# Patient Record
Sex: FEMALE | Race: WHITE | NOT HISPANIC OR LATINO | Employment: FULL TIME | URBAN - METROPOLITAN AREA
[De-identification: names, ages, dates, MRNs, and addresses within clinical notes are randomized per-mention and may not be internally consistent; named-entity substitution may affect disease eponyms.]

---

## 2017-01-18 ENCOUNTER — OFFICE VISIT (OUTPATIENT)
Dept: GASTROENTEROLOGY | Facility: CLINIC | Age: 42
End: 2017-01-18
Payer: COMMERCIAL

## 2017-01-18 VITALS
SYSTOLIC BLOOD PRESSURE: 119 MMHG | DIASTOLIC BLOOD PRESSURE: 69 MMHG | HEART RATE: 78 BPM | HEIGHT: 61 IN | BODY MASS INDEX: 23.19 KG/M2 | WEIGHT: 122.81 LBS

## 2017-01-18 DIAGNOSIS — K59.00 CONSTIPATION, UNSPECIFIED CONSTIPATION TYPE: Primary | ICD-10-CM

## 2017-01-18 PROCEDURE — 99203 OFFICE O/P NEW LOW 30 MIN: CPT | Mod: S$GLB,,, | Performed by: INTERNAL MEDICINE

## 2017-01-18 PROCEDURE — 99999 PR PBB SHADOW E&M-EST. PATIENT-LVL III: CPT | Mod: PBBFAC,,, | Performed by: INTERNAL MEDICINE

## 2017-01-18 PROCEDURE — 1159F MED LIST DOCD IN RCRD: CPT | Mod: S$GLB,,, | Performed by: INTERNAL MEDICINE

## 2017-01-18 RX ORDER — LEVALBUTEROL TARTRATE 45 UG/1
AEROSOL, METERED ORAL
COMMUNITY
Start: 2017-01-14 | End: 2018-03-13 | Stop reason: SDUPTHER

## 2017-01-18 RX ORDER — DEXTROAMPHETAMINE SACCHARATE, AMPHETAMINE ASPARTATE MONOHYDRATE, DEXTROAMPHETAMINE SULFATE AND AMPHETAMINE SULFATE 2.5; 2.5; 2.5; 2.5 MG/1; MG/1; MG/1; MG/1
5-10 CAPSULE, EXTENDED RELEASE ORAL DAILY
Refills: 0 | COMMUNITY
Start: 2017-01-07 | End: 2018-09-11 | Stop reason: DRUGHIGH

## 2017-01-18 RX ORDER — INSULIN ASPART 100 [IU]/ML
INJECTION, SOLUTION INTRAVENOUS; SUBCUTANEOUS
COMMUNITY
Start: 2016-12-29 | End: 2018-03-02

## 2017-01-18 RX ORDER — PEN NEEDLE, DIABETIC 32 GX 1/6"
NEEDLE, DISPOSABLE MISCELLANEOUS
COMMUNITY
Start: 2016-12-30 | End: 2021-12-31 | Stop reason: SDUPTHER

## 2017-01-18 RX ORDER — BLOOD SUGAR DIAGNOSTIC
STRIP MISCELLANEOUS
COMMUNITY
Start: 2016-12-15 | End: 2019-02-13

## 2017-01-18 RX ORDER — SODIUM CHLORIDE FOR INHALATION 7 %
4 VIAL, NEBULIZER (ML) INHALATION
COMMUNITY
Start: 2016-12-15 | End: 2018-03-13 | Stop reason: SDUPTHER

## 2017-01-18 RX ORDER — ALPRAZOLAM 0.5 MG/1
0.5 TABLET ORAL 2 TIMES DAILY PRN
COMMUNITY
Start: 2017-01-14 | End: 2023-12-27 | Stop reason: SDUPTHER

## 2017-01-18 NOTE — PROGRESS NOTES
Subjective:       Patient ID: Olinda Lombardi is a 41 y.o. female.    Chief Complaint: Hematochezia    This is a 41-year-old female who presents for evaluation of changes in bowel habits.  She has noted intermittently for some time and in the past has taken MiraLAX.  She's noted decreased frequency of bowel movements associated with some straining.  She'll then have days where she has to go numerous times and spends considerable time in the restroom.  This then was associated with hematochezia.  It only occurred during bowel movements and is described as bright red blood on the toilet paper and streaking the stool.  No change in stool caliber.  No unintentional weight loss.  The symptoms did resolve.  No further episodes over the past week.  No anorectal pain or lesions.  No family history of colon cancer or polyps.    The following portions of the patient's history were reviewed and updated as appropriate: allergies, current medications, past family history, past medical history, past social history, past surgical history and problem list.        HPI  Review of Systems   Constitutional: Negative for appetite change, chills and fever.   HENT: Negative for postnasal drip and trouble swallowing.    Eyes: Negative for pain and redness.   Respiratory: Negative for cough, choking, chest tightness and shortness of breath.    Cardiovascular: Negative for chest pain and leg swelling.   Gastrointestinal: Positive for constipation. Negative for abdominal distention, abdominal pain, blood in stool, nausea and rectal pain.   Endocrine: Negative for cold intolerance and heat intolerance.   Genitourinary: Negative for difficulty urinating and hematuria.   Musculoskeletal: Negative for arthralgias and back pain.   Skin: Negative for color change and pallor.   Allergic/Immunologic: Negative for environmental allergies and food allergies.   Neurological: Negative for dizziness and light-headedness.   Hematological: Negative for  adenopathy. Does not bruise/bleed easily.   Psychiatric/Behavioral: Negative for agitation and behavioral problems.       Objective:      Physical Exam   Constitutional: She is oriented to person, place, and time. She appears well-developed and well-nourished. No distress.   HENT:   Head: Normocephalic and atraumatic.   Eyes: Conjunctivae are normal. No scleral icterus.   Neck: Normal range of motion. Neck supple. No tracheal deviation present. No thyromegaly present.   Cardiovascular: Normal rate and regular rhythm.  Exam reveals no gallop and no friction rub.    No murmur heard.  Pulmonary/Chest: Effort normal and breath sounds normal. No respiratory distress. She has no wheezes.   Abdominal: Soft. Bowel sounds are normal. She exhibits no distension. There is no tenderness.   Musculoskeletal:        Right wrist: She exhibits normal range of motion and no tenderness.        Left wrist: She exhibits normal range of motion and no tenderness.   Lymphadenopathy:        Head (right side): No submental and no submandibular adenopathy present.        Head (left side): No submental and no submandibular adenopathy present.   Neurological: She is alert and oriented to person, place, and time.   Skin: Skin is warm and dry. No rash noted. She is not diaphoretic. No erythema.   Psychiatric: She has a normal mood and affect. Her behavior is normal.   Nursing note and vitals reviewed.      Assessment:       1. Constipation, unspecified constipation type        Plan:   1. Suspect outlet bleeding from constipation which is likely related to her CF and altered intestinal fluid composition. She will take miralax daily and I provided her with our information. We discussed conservative vs endoscopic evaluation and will be conservative at this time. Any recurrence of symptoms or lack of improvement in bowel habits and we will plan colonoscopy

## 2018-01-05 ENCOUNTER — OFFICE VISIT (OUTPATIENT)
Dept: URGENT CARE | Facility: CLINIC | Age: 43
End: 2018-01-05
Payer: COMMERCIAL

## 2018-01-05 VITALS
OXYGEN SATURATION: 98 % | DIASTOLIC BLOOD PRESSURE: 76 MMHG | RESPIRATION RATE: 18 BRPM | BODY MASS INDEX: 23.03 KG/M2 | WEIGHT: 122 LBS | HEART RATE: 107 BPM | HEIGHT: 61 IN | TEMPERATURE: 103 F | SYSTOLIC BLOOD PRESSURE: 132 MMHG

## 2018-01-05 DIAGNOSIS — R50.9 FEVER, UNSPECIFIED FEVER CAUSE: ICD-10-CM

## 2018-01-05 DIAGNOSIS — J10.1 INFLUENZA B: Primary | ICD-10-CM

## 2018-01-05 LAB
CTP QC/QA: YES
FLUAV AG NPH QL: NEGATIVE
FLUBV AG NPH QL: POSITIVE

## 2018-01-05 PROCEDURE — 99203 OFFICE O/P NEW LOW 30 MIN: CPT | Mod: S$GLB,,, | Performed by: PHYSICIAN ASSISTANT

## 2018-01-05 PROCEDURE — 87804 INFLUENZA ASSAY W/OPTIC: CPT | Mod: 59,QW,S$GLB, | Performed by: PHYSICIAN ASSISTANT

## 2018-01-05 RX ORDER — BENZONATATE 100 MG/1
100 CAPSULE ORAL 3 TIMES DAILY PRN
Qty: 30 CAPSULE | Refills: 0 | Status: SHIPPED | OUTPATIENT
Start: 2018-01-05 | End: 2018-03-13

## 2018-01-05 RX ORDER — ONDANSETRON HYDROCHLORIDE 8 MG/1
8 TABLET, FILM COATED ORAL EVERY 8 HOURS PRN
Qty: 15 TABLET | Refills: 0 | Status: SHIPPED | OUTPATIENT
Start: 2018-01-05 | End: 2018-03-13

## 2018-01-05 RX ORDER — OSELTAMIVIR PHOSPHATE 75 MG/1
75 CAPSULE ORAL 2 TIMES DAILY
Qty: 10 CAPSULE | Refills: 0 | Status: SHIPPED | OUTPATIENT
Start: 2018-01-05 | End: 2018-01-10

## 2018-01-06 NOTE — PATIENT INSTRUCTIONS
-Rest and stay hydrated.  -Tylenol every 4 hours OR ibuprofen every 6 hours as needed for pain/fever.  -Flonase OTC or Nasacort OTC for nasal congestion.  -Warm face compresses to help with facial sinus pain/pressure.  -Simple foods like chicken noodle soup.  -Delsym helps with coughing at night  -Zyrtec/Claritin during the day & Benadryl at night may help with allergies.    Please follow up with your primary care provider within 2-5 days if your signs and symptoms have not resolved or worsen.     If your condition worsens or fails to improve we recommend that you receive another evaluation at the emergency room immediately or contact your primary medical clinic to discuss your concerns.   You must understand that you have received an Urgent Care treatment only and that you may be released before all of your medical problems are known or treated. You, the patient, will arrange for follow up care as instructed.         The Flu (Influenza)     The virus that causes the flu spreads through the air in droplets when someone who has the flu coughs, sneezes, laughs, or talks.   The flu (influenza) is an infection that affects your respiratory tract. This tract is made up of your mouth, nose, and lungs, and the passages between them. Unlike a cold, the flu can make you very ill. And it can lead to pneumonia, a serious lung infection. The flu can have serious complications and even cause death.  Who is at risk for the flu?  Anyone can get the flu. But you are more likely to become infected if you:  · Have a weakened immune system  · Work in a healthcare setting where you may be exposed to flu germs  · Live or work with someone who has the flu  · Havent had an annual flu shot  How does the flu spread?  The flu is caused by a virus. The virus spreads through the air in droplets when someone who has the flu coughs, sneezes, laughs, or talks. You can become infected when you inhale these viruses directly. You can also become  infected when you touch a surface on which the droplets have landed and then transfer the germs to your eyes, nose, or mouth. Touching used tissues, or sharing utensils, drinking glasses, or a toothbrush from an infected person can expose you to flu viruses, too.  What are the symptoms of the flu?  Flu symptoms tend to come on quickly and may last a few days to a few weeks. They include:  · Fever usually higher than 100.4°F  (38°C) and chills  · Sore throat and headache  · Dry cough  · Runny nose  · Tiredness and weakness  · Muscle aches  Who is at risk for flu complications?  For some people, the flu can be very serious. The risk for complications is greater for:  · Children younger than age 5  · Adults ages 65 and older  · People with a chronic illness such as diabetes or heart, kidney, or lung disease  · People who live in a nursing home or long-term care facility   How is the flu treated?  The flu usually gets better after 7 days or so. In some cases, your healthcare provider may prescribe an antiviral medicine. This may help you get well a little sooner. For the medicine to help, you need to take it as soon as possible (ideally within 48 hours) after your symptoms start. If you develop pneumonia or other serious illness, you may need to stay in the hospital.  Easing flu symptoms  · Drink lots of fluids such as water, juice, and warm soup. A good rule is to drink enough so that you urinate your normal amount.  · Get plenty of rest.  · Ask your healthcare provider what to take for fever and pain.  · Call your provider if your fever is 100.4°F (38°C) or higher, or you become dizzy, lightheaded, or short of breath.  Taking steps to protect others  · Wash your hands often, especially after coughing or sneezing. Or clean your hands with an alcohol-based hand  containing at least 60% alcohol.  · Cough or sneeze into a tissue. Then throw the tissue away and wash your hands. If you dont have a tissue, cough and  sneeze into your elbow.  · Stay home until at least 24 hours after you no longer have a fever or chills. Be sure the fever isnt being hidden by fever-reducing medicine.  · Dont share food, utensils, drinking glasses, or a toothbrush with others.  · Ask your healthcare provider if others in your household should get antiviral medicine to help them avoid infection.  How can the flu be prevented?  · One of the best ways to avoid the flu is to get a flu vaccine each year. The virus that causes the flu changes from year to year. For that reason, healthcare providers recommend getting the flu vaccine each year, as soon as it's available in your area. The vaccine is given as a shot. Your healthcare provider can tell you which vaccine is right for you. A nasal spray is also available but is not recommended for the 4476-4674 flu season. The CDC says this is because the nasal spray did not seem to protect against the flu over the last several flu seasons. In the past, it was meant for people ages 2 to 49.  · Wash your hands often. Frequent handwashing is a proven way to help prevent infection.  · Carry an alcohol-based hand gel containing at least 60% alcohol. Use it when you can't use soap and water. Then wash your hands as soon as you can.  · Avoid touching your eyes, nose, and mouth.  · At home and work, clean phones, computer keyboards, and toys often with disinfectant wipes.  · If possible, avoid close contact with others who have the flu or symptoms of the flu.  Handwashing tips  Handwashing is one of the best ways to prevent many common infections. If you are caring for or visiting someone with the flu, wash your hands each time you enter and leave the room. Follow these steps:  · Use warm water and plenty of soap. Rub your hands together well.  · Clean the whole hand, including under your nails, between your fingers, and up the wrists.  · Wash for at least 15 seconds.  · Rinse, letting the water run down your  fingers, not up your wrists.  · Dry your hands well. Use a paper towel to turn off the faucet and open the door.  Using alcohol-based hand   Alcohol-based hand  are also a good choice. Use them when you can't use soap and water. Follow these steps:  · Squeeze about a tablespoon of gel into the palm of one hand.  · Rub your hands together briskly, cleaning the backs of your hands, the palms, between your fingers, and up the wrists.  · Rub until the gel is gone and your hands are completely dry.  Preventing the flu in healthcare settings  The flu is a special concern for people in hospitals and long-term care facilities. To help prevent the spread of flu, many hospitals and nursing homes take these steps:  · Healthcare providers wash their hands or use an alcohol-based hand  before and after treating each patient.  · People with the flu have private rooms and bathrooms or share a room with someone with the same infection.  · People who are at high risk for the flu but don't have it are encouraged to get the flu and pneumonia vaccines.  · All healthcare workers are encouraged or required to get flu shots.   Date Last Reviewed: 12/1/2016  © 3436-1596 [employer]. [address] [place] [address], [place] [address]. All rights reserved. This information is not intended as a substitute for professional medical care. Always follow your healthcare professional's instructions.

## 2018-01-06 NOTE — PROGRESS NOTES
"Subjective:       Patient ID: Olinda Lombardi is a 42 y.o. female.    Vitals:  height is 5' 1" (1.549 m) and weight is 55.3 kg (122 lb). Her temperature is 102.5 °F (39.2 °C) (abnormal). Her blood pressure is 132/76 and her pulse is 107. Her respiration is 18 and oxygen saturation is 98%.     Chief Complaint: Fever    Fever    This is a new problem. The current episode started today. The problem occurs constantly. The problem has been unchanged. The maximum temperature noted was 101 to 101.9 F. The temperature was taken using an oral thermometer. Associated symptoms include congestion, coughing and muscle aches. Pertinent negatives include no abdominal pain, chest pain, ear pain, headaches, nausea, sore throat or wheezing. She has tried NSAIDs for the symptoms. The treatment provided mild relief.     Review of Systems   Constitution: Positive for chills, fever and malaise/fatigue.   HENT: Positive for congestion. Negative for ear pain, hoarse voice and sore throat.    Eyes: Negative for discharge and redness.   Cardiovascular: Negative for chest pain, dyspnea on exertion and leg swelling.   Respiratory: Positive for cough. Negative for shortness of breath, sputum production and wheezing.    Musculoskeletal: Positive for myalgias.   Gastrointestinal: Negative for abdominal pain and nausea.   Neurological: Negative for headaches.       Objective:      Physical Exam   Constitutional: She is oriented to person, place, and time. She appears well-developed and well-nourished. She appears ill. No distress.   HENT:   Head: Normocephalic and atraumatic.   Right Ear: Hearing, tympanic membrane, external ear and ear canal normal.   Left Ear: Hearing, tympanic membrane, external ear and ear canal normal.   Nose: Nose normal. No mucosal edema. Right sinus exhibits no maxillary sinus tenderness and no frontal sinus tenderness. Left sinus exhibits no maxillary sinus tenderness and no frontal sinus tenderness.   Mouth/Throat: " Uvula is midline and oropharynx is clear and moist. No oropharyngeal exudate, posterior oropharyngeal edema or posterior oropharyngeal erythema.   Eyes: Conjunctivae, EOM and lids are normal. Right eye exhibits no discharge. Left eye exhibits no discharge.   Neck: Normal range of motion. Neck supple.   Cardiovascular: Normal rate, regular rhythm and normal heart sounds.  Exam reveals no gallop and no friction rub.    No murmur heard.  Pulmonary/Chest: Effort normal and breath sounds normal. No respiratory distress. She has no decreased breath sounds. She has no wheezes. She has no rhonchi. She has no rales.   Musculoskeletal: Normal range of motion.   Lymphadenopathy:        Head (right side): No submandibular and no tonsillar adenopathy present.        Head (left side): No submandibular and no tonsillar adenopathy present.   Neurological: She is alert and oriented to person, place, and time.   Skin: Skin is warm and dry. No rash noted. No erythema.   Psychiatric: She has a normal mood and affect. Her behavior is normal.   Nursing note and vitals reviewed.      Assessment:       1. Influenza B    2. Fever, unspecified fever cause        Office Visit on 01/05/2018   Component Date Value Ref Range Status    Rapid Influenza A Ag 01/05/2018 Negative  Negative Final    Rapid Influenza B Ag 01/05/2018 Positive* Negative Final     Acceptable 01/05/2018 Yes   Final     Plan:         Influenza B  -     POCT Influenza A/B  -     benzonatate (TESSALON PERLES) 100 MG capsule; Take 1 capsule (100 mg total) by mouth 3 (three) times daily as needed for Cough.  Dispense: 30 capsule; Refill: 0  -     oseltamivir (TAMIFLU) 75 MG capsule; Take 1 capsule (75 mg total) by mouth 2 (two) times daily.  Dispense: 10 capsule; Refill: 0  -     ondansetron (ZOFRAN) 8 MG tablet; Take 1 tablet (8 mg total) by mouth every 8 (eight) hours as needed for Nausea.  Dispense: 15 tablet; Refill: 0    Fever, unspecified fever cause  -      POCT Influenza A/B  -     benzonatate (TESSALON PERLES) 100 MG capsule; Take 1 capsule (100 mg total) by mouth 3 (three) times daily as needed for Cough.  Dispense: 30 capsule; Refill: 0  -     oseltamivir (TAMIFLU) 75 MG capsule; Take 1 capsule (75 mg total) by mouth 2 (two) times daily.  Dispense: 10 capsule; Refill: 0  -     ondansetron (ZOFRAN) 8 MG tablet; Take 1 tablet (8 mg total) by mouth every 8 (eight) hours as needed for Nausea.  Dispense: 15 tablet; Refill: 0      Patient Instructions     -Rest and stay hydrated.  -Tylenol every 4 hours OR ibuprofen every 6 hours as needed for pain/fever.  -Flonase OTC or Nasacort OTC for nasal congestion.  -Warm face compresses to help with facial sinus pain/pressure.  -Simple foods like chicken noodle soup.  -Delsym helps with coughing at night  -Zyrtec/Claritin during the day & Benadryl at night may help with allergies.    Please follow up with your primary care provider within 2-5 days if your signs and symptoms have not resolved or worsen.     If your condition worsens or fails to improve we recommend that you receive another evaluation at the emergency room immediately or contact your primary medical clinic to discuss your concerns.   You must understand that you have received an Urgent Care treatment only and that you may be released before all of your medical problems are known or treated. You, the patient, will arrange for follow up care as instructed.         The Flu (Influenza)     The virus that causes the flu spreads through the air in droplets when someone who has the flu coughs, sneezes, laughs, or talks.   The flu (influenza) is an infection that affects your respiratory tract. This tract is made up of your mouth, nose, and lungs, and the passages between them. Unlike a cold, the flu can make you very ill. And it can lead to pneumonia, a serious lung infection. The flu can have serious complications and even cause death.  Who is at risk for the  flu?  Anyone can get the flu. But you are more likely to become infected if you:  · Have a weakened immune system  · Work in a healthcare setting where you may be exposed to flu germs  · Live or work with someone who has the flu  · Havent had an annual flu shot  How does the flu spread?  The flu is caused by a virus. The virus spreads through the air in droplets when someone who has the flu coughs, sneezes, laughs, or talks. You can become infected when you inhale these viruses directly. You can also become infected when you touch a surface on which the droplets have landed and then transfer the germs to your eyes, nose, or mouth. Touching used tissues, or sharing utensils, drinking glasses, or a toothbrush from an infected person can expose you to flu viruses, too.  What are the symptoms of the flu?  Flu symptoms tend to come on quickly and may last a few days to a few weeks. They include:  · Fever usually higher than 100.4°F  (38°C) and chills  · Sore throat and headache  · Dry cough  · Runny nose  · Tiredness and weakness  · Muscle aches  Who is at risk for flu complications?  For some people, the flu can be very serious. The risk for complications is greater for:  · Children younger than age 5  · Adults ages 65 and older  · People with a chronic illness such as diabetes or heart, kidney, or lung disease  · People who live in a nursing home or long-term care facility   How is the flu treated?  The flu usually gets better after 7 days or so. In some cases, your healthcare provider may prescribe an antiviral medicine. This may help you get well a little sooner. For the medicine to help, you need to take it as soon as possible (ideally within 48 hours) after your symptoms start. If you develop pneumonia or other serious illness, you may need to stay in the hospital.  Easing flu symptoms  · Drink lots of fluids such as water, juice, and warm soup. A good rule is to drink enough so that you urinate your normal  amount.  · Get plenty of rest.  · Ask your healthcare provider what to take for fever and pain.  · Call your provider if your fever is 100.4°F (38°C) or higher, or you become dizzy, lightheaded, or short of breath.  Taking steps to protect others  · Wash your hands often, especially after coughing or sneezing. Or clean your hands with an alcohol-based hand  containing at least 60% alcohol.  · Cough or sneeze into a tissue. Then throw the tissue away and wash your hands. If you dont have a tissue, cough and sneeze into your elbow.  · Stay home until at least 24 hours after you no longer have a fever or chills. Be sure the fever isnt being hidden by fever-reducing medicine.  · Dont share food, utensils, drinking glasses, or a toothbrush with others.  · Ask your healthcare provider if others in your household should get antiviral medicine to help them avoid infection.  How can the flu be prevented?  · One of the best ways to avoid the flu is to get a flu vaccine each year. The virus that causes the flu changes from year to year. For that reason, healthcare providers recommend getting the flu vaccine each year, as soon as it's available in your area. The vaccine is given as a shot. Your healthcare provider can tell you which vaccine is right for you. A nasal spray is also available but is not recommended for the 2973-6518 flu season. The CDC says this is because the nasal spray did not seem to protect against the flu over the last several flu seasons. In the past, it was meant for people ages 2 to 49.  · Wash your hands often. Frequent handwashing is a proven way to help prevent infection.  · Carry an alcohol-based hand gel containing at least 60% alcohol. Use it when you can't use soap and water. Then wash your hands as soon as you can.  · Avoid touching your eyes, nose, and mouth.  · At home and work, clean phones, computer keyboards, and toys often with disinfectant wipes.  · If possible, avoid close  contact with others who have the flu or symptoms of the flu.  Handwashing tips  Handwashing is one of the best ways to prevent many common infections. If you are caring for or visiting someone with the flu, wash your hands each time you enter and leave the room. Follow these steps:  · Use warm water and plenty of soap. Rub your hands together well.  · Clean the whole hand, including under your nails, between your fingers, and up the wrists.  · Wash for at least 15 seconds.  · Rinse, letting the water run down your fingers, not up your wrists.  · Dry your hands well. Use a paper towel to turn off the faucet and open the door.  Using alcohol-based hand   Alcohol-based hand  are also a good choice. Use them when you can't use soap and water. Follow these steps:  · Squeeze about a tablespoon of gel into the palm of one hand.  · Rub your hands together briskly, cleaning the backs of your hands, the palms, between your fingers, and up the wrists.  · Rub until the gel is gone and your hands are completely dry.  Preventing the flu in healthcare settings  The flu is a special concern for people in hospitals and long-term care facilities. To help prevent the spread of flu, many hospitals and nursing homes take these steps:  · Healthcare providers wash their hands or use an alcohol-based hand  before and after treating each patient.  · People with the flu have private rooms and bathrooms or share a room with someone with the same infection.  · People who are at high risk for the flu but don't have it are encouraged to get the flu and pneumonia vaccines.  · All healthcare workers are encouraged or required to get flu shots.   Date Last Reviewed: 12/1/2016  © 8050-7340 [employer]. [address] [place] [address], [place] [address]. All rights reserved. This information is not intended as a substitute for professional medical care. Always follow your healthcare professional's instructions.

## 2018-02-19 ENCOUNTER — TELEPHONE (OUTPATIENT)
Dept: TRANSPLANT | Facility: CLINIC | Age: 43
End: 2018-02-19

## 2018-02-19 DIAGNOSIS — E84.9 CYSTIC FIBROSIS: Primary | ICD-10-CM

## 2018-02-19 NOTE — TELEPHONE ENCOUNTER
"Returned patient's call.  She states that she usually sees Dr. Silva at [facility] and is seeking CF care elsewhere.  She states that she is frustrated because Dr. Silva will not order things for her when she requests.  When asked if she is compliant with appointments and therapies, she states she is not.  She states that she goes to clinic "maybe once a year" and does not use inhaled therapies as ordered.  She states that she has not been hospitalized and believes that she is colonized with pseudomonas aeruginosa.  Explained that we are not a multidisciplinary CF center and we do not have the services that [facility] offers.  Also explained that Dr. Pate is a Transplant Pulmonologist and sees transplant patients.  Explained that I would have to discuss whether or not Dr. Pate could take her on as a patient with him.  She verbalized understanding.    ----- Message from Esha Correia sent at 2/19/2018  2:51 PM CST -----  Contact: Self [phone]  Pt is requesting a call back from the nurse to see if she can be scheduled as a new patient.  Pt was referred by Dr Rocío Marie and also recommended by Karen Jin. I was not given the doctor as an option for her condition, which is cystic fibrosis.     Patient may be reached at [phone].    Thank you.  LC    "

## 2018-02-23 NOTE — TELEPHONE ENCOUNTER
Pt returned my call regarding scheduling an appointment with Dr. Pate.  We discussed her health maintenance tests, she states she has not had anything done in over a year.  She also states that she has never had a DXA scan.  Pt states she had a chest xray within the last 6 months and has the disc.  She will bring it to her appointment.  Pt is aware that she will need lab work, DXA scan, spirometry, and sputum culture and states that she can come on 3/13.  Scheduling card placed on YongChe's desk.

## 2018-03-02 ENCOUNTER — TELEPHONE (OUTPATIENT)
Dept: ENDOCRINOLOGY | Facility: CLINIC | Age: 43
End: 2018-03-02

## 2018-03-02 ENCOUNTER — OFFICE VISIT (OUTPATIENT)
Dept: ENDOCRINOLOGY | Facility: CLINIC | Age: 43
End: 2018-03-02
Payer: COMMERCIAL

## 2018-03-02 VITALS
DIASTOLIC BLOOD PRESSURE: 80 MMHG | HEIGHT: 61 IN | SYSTOLIC BLOOD PRESSURE: 118 MMHG | BODY MASS INDEX: 22.4 KG/M2 | WEIGHT: 118.63 LBS

## 2018-03-02 DIAGNOSIS — E84.8 DIABETES MELLITUS RELATED TO CYSTIC FIBROSIS: Primary | ICD-10-CM

## 2018-03-02 DIAGNOSIS — E08.9 DIABETES MELLITUS RELATED TO CYSTIC FIBROSIS: Primary | ICD-10-CM

## 2018-03-02 DIAGNOSIS — E84.9 CYSTIC FIBROSIS: ICD-10-CM

## 2018-03-02 PROCEDURE — 99999 PR PBB SHADOW E&M-EST. PATIENT-LVL III: CPT | Mod: PBBFAC,,, | Performed by: INTERNAL MEDICINE

## 2018-03-02 PROCEDURE — 99204 OFFICE O/P NEW MOD 45 MIN: CPT | Mod: S$GLB,,, | Performed by: INTERNAL MEDICINE

## 2018-03-02 NOTE — PROGRESS NOTES
Subjective:      Patient ID: Olinda Lombardi is a 42 y.o. female.    Chief Complaint:  Diabetes      History of Present Illness  Ms. Lombardi presents for management of Cystic Fibrosis related diabetes    Has active history of cystic fibrosis and cystic fibrosis related diabetes.    CFRD diagnosed in 2014/2015. Was previously followed by [facility] endocrinology.    Diabetes Complications:  Denies numbness and tingling in feet   Last eye exam 8/2016.  No recent urine micro on file    Current Diabetes Regimen:  Novolog at 1:18 carb ratio (usually takes 3-4 units per meal)  No basal needs    Reports full compliance with diabetes regimen.    Current Self Reported Glucoses (usually checks on 3-4 times per day)  AM: Low 100's fasting  Pre meal: low 100's    Gets hypoglycemia a few days per week, can get into the 40's rarely. Able to recognize and treat symptoms.    Eats 2 meals per day, only gives insulin if she eats.       Review of Systems   Constitutional: Negative for unexpected weight change.   HENT: Negative for voice change.    Eyes: Negative for visual disturbance.   Respiratory: Negative for shortness of breath.    Cardiovascular: Negative for chest pain.   Gastrointestinal: Negative for abdominal pain.   Endocrine: Negative for cold intolerance.   Genitourinary: Negative for frequency.   Musculoskeletal: Negative for myalgias.   Skin: Negative for rash.       Objective:   Physical Exam   Constitutional: She is oriented to person, place, and time. She appears well-developed and well-nourished.   HENT:   Head: Normocephalic and atraumatic.   Right Ear: External ear normal.   Left Ear: External ear normal.   Nose: Nose normal.   Neck: No tracheal deviation present. No thyromegaly present.   Cardiovascular: Normal rate, regular rhythm and normal heart sounds.    No edema   Pulmonary/Chest: Effort normal and breath sounds normal.   Abdominal: Soft. Bowel sounds are normal. There is no tenderness.   Musculoskeletal:    Normal gait, no cyanosis or clubbing   Neurological: She is alert and oriented to person, place, and time. She has normal reflexes.   Vibration sense intact   Skin: Skin is warm and dry. No rash noted.   No nodules, no ulcers   Psychiatric: She has a normal mood and affect. Judgment normal.   Vitals reviewed.      Lab Review:   No recent labs on file for review    Assessment:     1. 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 Cystic fibrosis      Plan:     --Patient with CF and CF related diabetes  --Will continue prandial insulin only at this time  --No basal insulin needs  --Will change from Novolog to Fiasp  --Will continue 1:18 ICHO with meals plus correction scale  --Patient to continue checking glucoses 3-4 times daily  --Has appt with Dr. Pate  --Referral to optometry for eye exam  --Referral to diabetes educator    RTC in 3-4 months with A1c     Zackery Lester M.D. Staff Endocrinology

## 2018-03-13 ENCOUNTER — HOSPITAL ENCOUNTER (OUTPATIENT)
Dept: PULMONOLOGY | Facility: CLINIC | Age: 43
Discharge: HOME | End: 2018-03-13
Payer: COMMERCIAL

## 2018-03-13 ENCOUNTER — TELEPHONE (OUTPATIENT)
Dept: TRANSPLANT | Facility: CLINIC | Age: 43
End: 2018-03-13

## 2018-03-13 ENCOUNTER — LAB VISIT (OUTPATIENT)
Dept: LAB | Facility: HOSPITAL | Age: 43
End: 2018-03-13
Attending: INTERNAL MEDICINE
Payer: COMMERCIAL

## 2018-03-13 ENCOUNTER — HOSPITAL ENCOUNTER (OUTPATIENT)
Dept: RADIOLOGY | Facility: CLINIC | Age: 43
Discharge: HOME | End: 2018-03-13
Attending: INTERNAL MEDICINE
Payer: COMMERCIAL

## 2018-03-13 ENCOUNTER — OFFICE VISIT (OUTPATIENT)
Dept: TRANSPLANT | Facility: CLINIC | Age: 43
End: 2018-03-13
Payer: COMMERCIAL

## 2018-03-13 VITALS
HEART RATE: 89 BPM | BODY MASS INDEX: 22.08 KG/M2 | OXYGEN SATURATION: 100 % | SYSTOLIC BLOOD PRESSURE: 125 MMHG | RESPIRATION RATE: 20 BRPM | WEIGHT: 120 LBS | HEIGHT: 62 IN | TEMPERATURE: 97 F | DIASTOLIC BLOOD PRESSURE: 66 MMHG

## 2018-03-13 DIAGNOSIS — E08.9 DIABETES MELLITUS RELATED TO CYSTIC FIBROSIS: ICD-10-CM

## 2018-03-13 DIAGNOSIS — E84.9 CYSTIC FIBROSIS: ICD-10-CM

## 2018-03-13 DIAGNOSIS — E84.9 CYSTIC FIBROSIS: Primary | ICD-10-CM

## 2018-03-13 DIAGNOSIS — K86.89 PANCREATIC INSUFFICIENCY: ICD-10-CM

## 2018-03-13 DIAGNOSIS — E84.8 DIABETES MELLITUS RELATED TO CYSTIC FIBROSIS: ICD-10-CM

## 2018-03-13 LAB
PRE FEV1 FVC: 67
PRE FEV1: 2.01
PRE FVC: 3.01
PREDICTED FEV1 FVC: 85
PREDICTED FEV1: 2.66
PREDICTED FVC: 3.14

## 2018-03-13 PROCEDURE — 87116 MYCOBACTERIA CULTURE: CPT

## 2018-03-13 PROCEDURE — 87206 SMEAR FLUORESCENT/ACID STAI: CPT

## 2018-03-13 PROCEDURE — 99999 PR PBB SHADOW E&M-EST. PATIENT-LVL III: CPT | Mod: PBBFAC,,, | Performed by: INTERNAL MEDICINE

## 2018-03-13 PROCEDURE — 87077 CULTURE AEROBIC IDENTIFY: CPT

## 2018-03-13 PROCEDURE — 87186 SC STD MICRODIL/AGAR DIL: CPT

## 2018-03-13 PROCEDURE — 87205 SMEAR GRAM STAIN: CPT

## 2018-03-13 PROCEDURE — 87070 CULTURE OTHR SPECIMN AEROBIC: CPT

## 2018-03-13 PROCEDURE — 77080 DXA BONE DENSITY AXIAL: CPT | Mod: TC

## 2018-03-13 PROCEDURE — 87015 SPECIMEN INFECT AGNT CONCNTJ: CPT

## 2018-03-13 PROCEDURE — 77080 DXA BONE DENSITY AXIAL: CPT | Mod: 26,,, | Performed by: INTERNAL MEDICINE

## 2018-03-13 PROCEDURE — 94010 BREATHING CAPACITY TEST: CPT | Mod: S$GLB,,, | Performed by: INTERNAL MEDICINE

## 2018-03-13 PROCEDURE — 99204 OFFICE O/P NEW MOD 45 MIN: CPT | Mod: 25,S$GLB,, | Performed by: INTERNAL MEDICINE

## 2018-03-13 RX ORDER — DEXTROAMPHETAMINE SACCHARATE, AMPHETAMINE ASPARTATE MONOHYDRATE, DEXTROAMPHETAMINE SULFATE AND AMPHETAMINE SULFATE 3.75; 3.75; 3.75; 3.75 MG/1; MG/1; MG/1; MG/1
15 CAPSULE, EXTENDED RELEASE ORAL EVERY MORNING
COMMUNITY
End: 2020-02-13

## 2018-03-13 RX ORDER — SODIUM CHLORIDE FOR INHALATION 7 %
4 VIAL, NEBULIZER (ML) INHALATION 2 TIMES DAILY
Qty: 240 ML | Refills: 12 | Status: SHIPPED | OUTPATIENT
Start: 2018-03-13 | End: 2020-03-13 | Stop reason: SDUPTHER

## 2018-03-13 RX ORDER — TOBRAMYCIN INHALATION 300 MG/4ML
300 SOLUTION RESPIRATORY (INHALATION) 2 TIMES DAILY
Qty: 240 ML | Refills: 12 | Status: SHIPPED | OUTPATIENT
Start: 2018-03-13 | End: 2018-03-26

## 2018-03-13 RX ORDER — LEVALBUTEROL TARTRATE 45 UG/1
1-2 AEROSOL, METERED ORAL EVERY 4 HOURS PRN
Qty: 1 INHALER | Refills: 12 | Status: SHIPPED | OUTPATIENT
Start: 2018-03-13 | End: 2021-09-28

## 2018-03-13 NOTE — PROGRESS NOTES
LUNG TRANSPLANT INITIAL EVALUATION                                                                                                                                            Reason for Visit:  Evaluation for lung transplant    Referring Physician: Self Referral    History of Present Illness: Olinda Lombardi is a 42 y.o. female who is on 0L of oxygen.  She is on no assisted ventilation.  Her New York Heart Association Class is I and a Karnofsky score of 90% - Able to carry on normal activity: minor symptoms of disease. She is diabetic insulin dependent  She presents to transplant clinic for initial evaluation of bronchiectasis from cystic fibrosis.  She is currently followed by Dr. Silva at [facility] but would like to transition care to [facility].        States that she was diagnosed with CF at the age of 6 months when she was evaluated for reflux and decreased weight.  She is a F508/542 del.  States she never truly had respiratory symptoms but was started on inhaler and airway clearance regimen in her 30's.  She has an occasional cough which is productive of green sputum.  Does not produce copious amounts.  She grows Pseudomonas and Staph.  No history of atypical mycobacterial infections.  She has never been hospitalized for CF exacerbation and has never required IV abx.  She has not required po abx for years.  She has never had pneumothoraces or hemoptysis.        Her current respiratory regimen is hypertonic saline, pulmozyme, levalbuterol, and CPT.  She has used the hypertonic saline twice within the last month, pulmozyme once in the last few months, and does not use her CPT vest.  She has had some shortness of breath and occasional cough for which she does use the levalbuterol daily.  Last time she was seen at [facility], it was recommended that she start azithro three times a week and inhaled torres or cayston.  She had reservations about starting these medications and therefore has not started them.      She  "has CF related DM for which she follows with endocrine.  She is adherent to her insulin therapy and her most recent HgbA1C is 6.1%.  She does not exercise but has lost weight since making dietary modifications once she was diagnosed with diabetes.  She takes aidee-pep as prescribed and has some trouble with constipation which is resolved with miralax.  She does not take any supplemental vitamins.  Denies any sinus congestion or drainage.  Takes xyzal for allergies.  Does not use any sinus rinses or steroids.  Had sinus surgery at the ages of 9 and 11.      She currently  and works as a relator.  Does not smoke or use illicits.  Does drink alcohol occasionally.  Does not have children.      Past Medical History:   Diagnosis Date    Abnormal Pap smear of vagina     Cystic fibrosis     Diabetes mellitus      Colonoscopy Results: No procedure found.    Past Surgical History:   Procedure Laterality Date    ADENOIDECTOMY       [place]: none    Blood Product Transfusions: no    Allergies: Penicillin    Current Outpatient Prescriptions   Medication Sig    ACZONE 5 % topical gel     alprazolam (XANAX) 0.5 MG tablet     AMPHETAMINE SALT COMBO 10 MG tablet Take 1 tablet by mouth once daily.    benzonatate (TESSALON PERLES) 100 MG capsule Take 1 capsule (100 mg total) by mouth 3 (three) times daily as needed for Cough.    CONTOUR NEXT STRIPS Strp     dextroamphetamine-amphetamine (ADDERALL XR) 10 MG 24 hr capsule Take by mouth once daily.    HYPER-SAL 7 % nebulizer solution     insulin aspart, niacinamide, (FIASP FLEXTOUCH U-100 INSULIN) 100 unit/mL (3 mL) InPn Inject 10 Units into the skin 3 (three) times daily before meals.    levocetirizine (XYZAL) 5 MG tablet     lipase-protease-amylase 20,000-68,000 -109,000 unit CpDR Take by mouth.    NOVOFINE PLUS 32 gauge x 1/6" Ndle     ondansetron (ZOFRAN) 8 MG tablet Take 1 tablet (8 mg total) by mouth every 8 (eight) hours as needed for Nausea.    sertraline " (ZOLOFT) 100 MG tablet     TAZORAC 0.05 % Crea cream     trazodone (DESYREL) 150 MG tablet     XOPENEX HFA 45 mcg/actuation inhaler      No current facility-administered medications for this visit.          There is no immunization history on file for this patient.  Family History:    Family History   Problem Relation Age of Onset    Heart disease Father     Diabetes Mother     Breast cancer Neg Hx     Colon cancer Neg Hx     Ovarian cancer Neg Hx      History   Alcohol Use    1.2 oz/week    2 Glasses of wine per week     Comment: 1-2 drinks a week       History   Drug Use No      Social History     Social History    Marital status:      Spouse name: N/A    Number of children: N/A    Years of education: N/A     Occupational History    Not on file.     Social History Main Topics    Smoking status: Never Smoker    Smokeless tobacco: Never Used    Alcohol use 1.2 oz/week     2 Glasses of wine per week      Comment: 1-2 drinks a week     Drug use: No    Sexual activity: Yes     Partners: Male      Comment:       Other Topics Concern    Not on file     Social History Narrative    No narrative on file     Review of Systems   Constitutional: Negative for chills, diaphoresis, fever, malaise/fatigue and weight loss.   HENT: Negative for congestion, ear discharge, ear pain, hearing loss, nosebleeds, sinus pain, sore throat and tinnitus.    Eyes: Negative for blurred vision, double vision, photophobia, pain, discharge and redness.   Respiratory: Negative for cough, hemoptysis, sputum production, shortness of breath, wheezing and stridor.    Cardiovascular: Negative for chest pain, palpitations, orthopnea, claudication, leg swelling and PND.   Gastrointestinal: Negative for abdominal pain, blood in stool, constipation, diarrhea, heartburn, melena, nausea and vomiting.   Genitourinary: Negative for dysuria, flank pain, frequency, hematuria and urgency.   Musculoskeletal: Negative for back pain,  "falls, joint pain, myalgias and neck pain.   Skin: Negative for itching and rash.   Neurological: Negative for dizziness, tingling, tremors, sensory change, speech change, focal weakness, seizures, loss of consciousness, weakness and headaches.   Endo/Heme/Allergies: Negative for environmental allergies and polydipsia. Does not bruise/bleed easily.   Psychiatric/Behavioral: Negative for depression, hallucinations, memory loss, substance abuse and suicidal ideas. The patient is not nervous/anxious and does not have insomnia.      Vitals  /66   Pulse 89   Temp 96.7 °F (35.9 °C) (Oral)   Resp 20   Ht 5' 2" (1.575 m)   Wt 54.4 kg (120 lb)   SpO2 100%   BMI 21.95 kg/m²   Physical Exam   Constitutional: She is oriented to person, place, and time and well-developed, well-nourished, and in no distress. No distress.   HENT:   Head: Normocephalic and atraumatic.   Nose: Nose normal.   Mouth/Throat: Oropharynx is clear and moist. No oropharyngeal exudate.   Eyes: Conjunctivae and EOM are normal. Pupils are equal, round, and reactive to light. Right eye exhibits no discharge. Left eye exhibits no discharge. No scleral icterus.   Neck: Normal range of motion. Neck supple. No JVD present. No tracheal deviation present. No thyromegaly present.   Cardiovascular: Normal rate, regular rhythm, normal heart sounds and intact distal pulses.  Exam reveals no gallop and no friction rub.    No murmur heard.  Pulmonary/Chest: Effort normal and breath sounds normal. No stridor. No respiratory distress. She has no wheezes. She has no rales. She exhibits no tenderness.   Abdominal: Bowel sounds are normal. She exhibits no distension. There is no tenderness. There is no guarding.   Musculoskeletal: Normal range of motion. She exhibits no edema, tenderness or deformity.   Lymphadenopathy:     She has no cervical adenopathy.   Neurological: She is oriented to person, place, and time. No cranial nerve deficit. Gait normal. " Coordination normal. GCS score is 15.   Skin: Skin is dry. No rash noted. She is not diaphoretic. No erythema. No pallor.   Psychiatric: Mood and affect normal.       Labs:  Lab Visit on 03/13/2018   Component Date Value    WBC 03/13/2018 4.40     RBC 03/13/2018 3.80*    Hemoglobin 03/13/2018 9.8*    Hematocrit 03/13/2018 31.0*    MCV 03/13/2018 82     MCH 03/13/2018 25.8*    MCHC 03/13/2018 31.6*    RDW 03/13/2018 14.3     Platelets 03/13/2018 333     MPV 03/13/2018 9.8     Immature Granulocytes 03/13/2018 0.5     Gran # (ANC) 03/13/2018 2.9     Immature Grans (Abs) 03/13/2018 0.02     Lymph # 03/13/2018 1.1     Mono # 03/13/2018 0.2*    Eos # 03/13/2018 0.1     Baso # 03/13/2018 0.05     nRBC 03/13/2018 0     Gran% 03/13/2018 66.1     Lymph% 03/13/2018 24.8     Mono% 03/13/2018 5.2     Eosinophil% 03/13/2018 2.3     Basophil% 03/13/2018 1.1     Differential Method 03/13/2018 Automated        Pulmonary Function Tests 3/13/2018 8/31/2016 8/5/2015 10/29/2014   FVC 3.07 3.15 3.29 3.26   FEV1 2.01 2.09 2.14 2.16   FVC% 98 94 98 97   FEV1% 77 76 77 77   FEF 25-75 1.08 1.13 98 1.12   FEF 25-75% 36 38 32 36     Imaging:  Results for orders placed during the hospital encounter of 09/08/12   X-Ray Chest PA And Lateral    Narrative DATE OF EXAM: Sep  8 2012      GEN   0012  -  CHEST PA & LAT:   \  [id number]     CLINICAL HISTORY:   \CHEST PAIN     PROCEDURE COMMENT:   \     ICD 9 CODE(S):   (\)     CPT 4 CODE(S)/MODIFIER(S):   (\)     Comparison: None.      Findings: No pneumothorax. The cardiomediastinal contour is within normal   limits. No findings to suggest pleural effusions. The lungs are clear.        Impression:  No acute process identified.    ______________________________________      Electronically signed by: Zulema Toth MD  Date:     09/09/12  Time:    08:45            : ERICA  Transcribe Date/Time: Sep  9 2012  8:45A  Dictated by : ZULEMA TOTH MD  Read On:   \  Images  were reviewed, findings were verified and document was   electronically  SIGNED BY: ZULEMA DIEHL MD On: Sep  9 2012  8:45A          Cardiodiagnostics:  None    Assessment:  1. Cystic fibrosis    2. Pancreatic insufficiency    3. Diabetes mellitus related to cystic fibrosis      Plan:   1. Currently being evaluated today for CF related bronchiectasis.  She has mild obstruction on PFTs which remains stable compared to the data we have from [facility].  She has known colonization with Pseudomonas and occasionally grows Staph.  No hx of atypical mycobacterium.  Sputum culture done today is pending.  We had a long discussion today regarding treatment and compliance to therapy.  Discussed that while her disease is mild, it is still present and the aim should be preserving lung function.  Discussed that the two most important medications for her at this time are pulmozyme and inhaled tobramycin.  She is agreeable to doing the pulmozyme once daily (twice daily causes chest tightness) and the inhaled tobramycin.  Discussed that the proper use would be to use her inhaled levalbuterol followed by hypertonic saline followed by the pulmozyme followed by the CPT vest.  Inhaled torres will be BID.  Offered azithromycin 3 times weekly which she refuses at this time.  Discussed that we will need to see her every 3 months for evaluation and she is agreeable to the plan.    2. Continue with aidee-pep with meals and snacks.  Will add aquadeks today.  Vitamin levels are pending from labs today.  Will refer to Dr. Shultz for further evaluation.    3. Continue with insulin and endocrine follow-up.  HgbA1C today is 6.1%.    4. Follow-up in 3 months or earlier if needed.    Vidya Rasmussen DO  [facility]    Attending Note:    I have seen and evaluated the patient with the fellow. Their note reflects the content of our discussion and my plan of care.      Chadwick Pate MD  Pulmonary/Critical Care Medicine

## 2018-03-13 NOTE — PROGRESS NOTES
"LUNG TRANSPLANT INITIAL PULMONARY CONSULT    Reason for Visit: No chief complaint on file.       Referring Physician:                                                                                                       Date of Transplant: N/A    Type of Transplant: {Organ:64650}                                                                               Reason for Transplant: No diagnosis found.                                                                               CMV Status: {CMV STATUS:82287}                                                                              Major Complications: ***                                                                                              History of Present Illness: ***    Current Outpatient Prescriptions   Medication Sig    ACZONE 5 % topical gel     alprazolam (XANAX) 0.5 MG tablet     AMPHETAMINE SALT COMBO 10 MG tablet Take 1 tablet by mouth once daily.    benzonatate (TESSALON PERLES) 100 MG capsule Take 1 capsule (100 mg total) by mouth 3 (three) times daily as needed for Cough.    CONTOUR NEXT STRIPS Strp     dextroamphetamine-amphetamine (ADDERALL XR) 10 MG 24 hr capsule Take by mouth once daily.    HYPER-SAL 7 % nebulizer solution     insulin aspart, niacinamide, (FIASP FLEXTOUCH U-100 INSULIN) 100 unit/mL (3 mL) InPn Inject 10 Units into the skin 3 (three) times daily before meals.    levocetirizine (XYZAL) 5 MG tablet     lipase-protease-amylase 20,000-68,000 -109,000 unit CpDR Take by mouth.    NOVOFINE PLUS 32 gauge x 1/6" Ndle     ondansetron (ZOFRAN) 8 MG tablet Take 1 tablet (8 mg total) by mouth every 8 (eight) hours as needed for Nausea.    sertraline (ZOLOFT) 100 MG tablet     TAZORAC 0.05 % Crea cream     trazodone (DESYREL) 150 MG tablet     XOPENEX HFA 45 mcg/actuation inhaler      No current facility-administered medications for this visit.      ROS  Vitals  /66   Pulse 89   Temp 96.7 °F (35.9 °C) (Oral)   Resp " "20   Ht 5' 2" (1.575 m)   Wt 54.4 kg (120 lb)   SpO2 100%   BMI 21.95 kg/m²     Physical Exam    Labs:  Lab Visit on 03/13/2018   Component Date Value    WBC 03/13/2018 4.40     RBC 03/13/2018 3.80*    Hemoglobin 03/13/2018 9.8*    Hematocrit 03/13/2018 31.0*    MCV 03/13/2018 82     MCH 03/13/2018 25.8*    MCHC 03/13/2018 31.6*    RDW 03/13/2018 14.3     Platelets 03/13/2018 333     MPV 03/13/2018 9.8     Immature Granulocytes 03/13/2018 0.5     Gran # (ANC) 03/13/2018 2.9     Immature Grans (Abs) 03/13/2018 0.02     Lymph # 03/13/2018 1.1     Mono # 03/13/2018 0.2*    Eos # 03/13/2018 0.1     Baso # 03/13/2018 0.05     nRBC 03/13/2018 0     Gran% 03/13/2018 66.1     Lymph% 03/13/2018 24.8     Mono% 03/13/2018 5.2     Eosinophil% 03/13/2018 2.3     Basophil% 03/13/2018 1.1     Differential Method 03/13/2018 Automated        No flowsheet data found.  Other: ***    Assessment:  No diagnosis found.  Plan: ***   "

## 2018-03-13 NOTE — TELEPHONE ENCOUNTER
Pt asked if she could have food allergy labs added on to her labs.  Explained that if Dr. Pate would like to order food allergy labs, then they could be scheduled for a future date.  Explained that typically allergy panels would be ordered by an Allergist.  She verbalized understanding.    ----- Message from Bernie Peterson sent at 3/13/2018  9:10 AM CDT -----  Contact: Pt   Pt has questions regarding lab testing she is having done today, Please contact as soon as possible due to pt being there now     Contact [phone]  Thanks

## 2018-03-14 ENCOUNTER — TELEPHONE (OUTPATIENT)
Dept: PHARMACY | Facility: CLINIC | Age: 43
End: 2018-03-14

## 2018-03-15 ENCOUNTER — PATIENT MESSAGE (OUTPATIENT)
Dept: TRANSPLANT | Facility: CLINIC | Age: 43
End: 2018-03-15

## 2018-03-17 LAB
BACTERIA SPT CF RESP CULT: NORMAL
BACTERIA SPT CF RESP CULT: NORMAL
GRAM STN SPEC: NORMAL

## 2018-03-26 DIAGNOSIS — E84.9 CYSTIC FIBROSIS: Primary | ICD-10-CM

## 2018-03-26 RX ORDER — TOBRAMYCIN INHALATION 300 MG/4ML
300 SOLUTION RESPIRATORY (INHALATION) 2 TIMES DAILY
Qty: 240 ML | Refills: 11 | Status: SHIPPED | OUTPATIENT
Start: 2018-03-26 | End: 2019-12-05

## 2018-03-27 ENCOUNTER — TELEPHONE (OUTPATIENT)
Dept: PHARMACY | Facility: CLINIC | Age: 43
End: 2018-03-27

## 2018-04-05 ENCOUNTER — TELEPHONE (OUTPATIENT)
Dept: PHARMACY | Facility: HOSPITAL | Age: 43
End: 2018-04-05

## 2018-04-05 ENCOUNTER — PATIENT MESSAGE (OUTPATIENT)
Dept: TRANSPLANT | Facility: CLINIC | Age: 43
End: 2018-04-05

## 2018-04-06 ENCOUNTER — PATIENT MESSAGE (OUTPATIENT)
Dept: TRANSPLANT | Facility: CLINIC | Age: 43
End: 2018-04-06

## 2018-04-06 DIAGNOSIS — E84.9 CYSTIC FIBROSIS: Primary | ICD-10-CM

## 2018-04-16 ENCOUNTER — TELEPHONE (OUTPATIENT)
Dept: GASTROENTEROLOGY | Facility: CLINIC | Age: 43
End: 2018-04-16

## 2018-04-16 NOTE — TELEPHONE ENCOUNTER
Pt called running late.  MD advised for pt to reschedule appt.  Marj will call to assist pt with rescheduling appt.  Understanding expressed by pt.

## 2018-04-17 ENCOUNTER — TELEPHONE (OUTPATIENT)
Dept: GASTROENTEROLOGY | Facility: CLINIC | Age: 43
End: 2018-04-17

## 2018-04-17 NOTE — TELEPHONE ENCOUNTER
Patient was scheduled to see Dr. Shultz 4/16 at 8:00 am.    Patient called the clinic at 8:22 am 4/16 and asked if she can be seen.    Patient was asked to reschedule appointment.    Attempted to contact patient without success to reschedule appointment.    Left message for patient to call the clinic back to reschedule appointment with Dr. Shultz.

## 2018-04-19 ENCOUNTER — PATIENT MESSAGE (OUTPATIENT)
Dept: GASTROENTEROLOGY | Facility: CLINIC | Age: 43
End: 2018-04-19

## 2018-04-20 ENCOUNTER — TELEPHONE (OUTPATIENT)
Dept: GASTROENTEROLOGY | Facility: CLINIC | Age: 43
End: 2018-04-20

## 2018-04-23 ENCOUNTER — DOCUMENTATION ONLY (OUTPATIENT)
Dept: TRANSPLANT | Facility: CLINIC | Age: 43
End: 2018-04-23

## 2018-04-23 NOTE — PROGRESS NOTES
Notified Dr. Pate that patient has no showed or rescheduled her consult appointment with Dr. Shultz 3 times.

## 2018-05-02 DIAGNOSIS — E84.9 CYSTIC FIBROSIS: Primary | ICD-10-CM

## 2018-05-15 ENCOUNTER — PATIENT MESSAGE (OUTPATIENT)
Dept: ENDOCRINOLOGY | Facility: CLINIC | Age: 43
End: 2018-05-15

## 2018-05-15 LAB
ACID FAST MOD KINY STN SPEC: NORMAL
MYCOBACTERIUM SPEC QL CULT: NORMAL

## 2018-05-16 ENCOUNTER — PATIENT MESSAGE (OUTPATIENT)
Dept: ENDOCRINOLOGY | Facility: CLINIC | Age: 43
End: 2018-05-16

## 2018-05-17 ENCOUNTER — PATIENT MESSAGE (OUTPATIENT)
Dept: ENDOCRINOLOGY | Facility: CLINIC | Age: 43
End: 2018-05-17

## 2018-05-17 ENCOUNTER — PATIENT MESSAGE (OUTPATIENT)
Dept: GASTROENTEROLOGY | Facility: CLINIC | Age: 43
End: 2018-05-17

## 2018-05-22 ENCOUNTER — OFFICE VISIT (OUTPATIENT)
Dept: OBSTETRICS AND GYNECOLOGY | Facility: CLINIC | Age: 43
End: 2018-05-22
Payer: COMMERCIAL

## 2018-05-22 VITALS
WEIGHT: 115 LBS | HEIGHT: 62 IN | DIASTOLIC BLOOD PRESSURE: 86 MMHG | BODY MASS INDEX: 21.16 KG/M2 | SYSTOLIC BLOOD PRESSURE: 128 MMHG

## 2018-05-22 DIAGNOSIS — N63.0 BREAST MASS: ICD-10-CM

## 2018-05-22 DIAGNOSIS — Z01.419 ENCOUNTER FOR GYNECOLOGICAL EXAMINATION: Primary | ICD-10-CM

## 2018-05-22 DIAGNOSIS — N92.6 IRREGULAR BLEEDING: ICD-10-CM

## 2018-05-22 PROCEDURE — 99999 PR PBB SHADOW E&M-EST. PATIENT-LVL IV: CPT | Mod: PBBFAC,,, | Performed by: OBSTETRICS & GYNECOLOGY

## 2018-05-22 PROCEDURE — 87624 HPV HI-RISK TYP POOLED RSLT: CPT

## 2018-05-22 PROCEDURE — 88175 CYTOPATH C/V AUTO FLUID REDO: CPT

## 2018-05-22 PROCEDURE — 99396 PREV VISIT EST AGE 40-64: CPT | Mod: S$GLB,,, | Performed by: OBSTETRICS & GYNECOLOGY

## 2018-05-22 NOTE — PROGRESS NOTES
"CC: Well woman exam    Olinda Lombardi is a 43 y.o. female  presents for a well woman exam.      Irregular bleeding and spotting between periods.  Sexually active, does not prevent but hasn't gotten pregnant thus far.  Declines contraception.      C/o Swollen lymph node in right axilla x few months.  No breast masses.        Past Medical History:   Diagnosis Date    Abnormal Pap smear of vagina     Bilateral carpal tunnel syndrome     Cystic fibrosis     Diabetes mellitus     CFRD       Past Surgical History:   Procedure Laterality Date    ADENOIDECTOMY      SINUS SURGERY         OB History    Para Term  AB Living   0 0 0 0 0 0   SAB TAB Ectopic Multiple Live Births   0 0 0 0               Family History   Problem Relation Age of Onset    Heart disease Father     Diabetes Mother     Breast cancer Neg Hx     Colon cancer Neg Hx     Ovarian cancer Neg Hx        Social History   Substance Use Topics    Smoking status: Never Smoker    Smokeless tobacco: Never Used    Alcohol use 1.2 oz/week     2 Glasses of wine per week      Comment: 1-2 drinks a week        /86   Ht 5' 2" (1.575 m)   Wt 52.2 kg (114 lb 15.5 oz)   LMP 2018 (Exact Date)   BMI 21.03 kg/m²     ROS:  GENERAL: Denies weight gain or weight loss. Feeling well overall.   SKIN: Denies rash or lesions.   HEAD: Denies head injury or headache.   NODES: Denies enlarged lymph nodes.   CHEST: Denies chest pain or shortness of breath.   CARDIOVASCULAR: Denies palpitations or left sided chest pain.   ABDOMEN: No abdominal pain, constipation, diarrhea, nausea, vomiting or rectal bleeding.   URINARY: No frequency, dysuria, hematuria, or burning on urination.  REPRODUCTIVE: See HPI.   BREASTS: The patient performs breast self-examination and denies pain, lumps, or nipple discharge.   HEMATOLOGIC: No easy bruisability or excessive bleeding.  MUSCULOSKELETAL: Denies joint pain or swelling.   NEUROLOGIC: Denies syncope " or weakness.   PSYCHIATRIC: Denies depression, anxiety or mood swings.    Physical Exam:    APPEARANCE: Well nourished, well developed, in no acute distress.  AFFECT: WNL, alert and oriented x 3  SKIN: No acne or hirsutism  NECK: Neck symmetric without masses or thyromegaly  NODES: No inguinal, cervical, axillary, or femoral lymph node enlargement  CHEST: Good respiratory effect  ABDOMEN: Soft.  No tenderness or masses.  No hepatosplenomegaly.  No hernias.  BREASTS: Symmetrical, no skin changes or visible lesions.  No palpable masses, nipple discharge bilaterally.  PELVIC: Normal external genitalia without lesions.  Normal hair distribution.  Adequate perineal body, normal urethral meatus.  Vagina moist and well rugated without lesions or discharge.  Cervix pink, without lesions, discharge or tenderness.  No significant cystocele or rectocele.  Bimanual exam shows uterus to be normal size, regular, mobile and nontender.  Adnexa without masses or tenderness.    EXTREMITIES: No edema.    ASSESSMENT AND PLAN  1. Encounter for gynecological examination     2. Breast mass  Mammo Digital Diagnostic Bilat with Calixto    US Breast Right Complete   3. Irregular bleeding  US Pelvis Comp with Transvag NON-OB (xpd    Liquid-based pap smear, screening    HPV High Risk Genotypes, PCR       Patient was counseled today on A.C.S. Pap guidelines and recommendations for yearly pelvic exams, mammograms and monthly self breast exams; to see her PCP for other health maintenance.     Ultrasound for eval irregular bleeding and spotting.  On exam able to feel node but feel another on left. Get mmg and imaging.     Follow-up in about 1 year (around 5/22/2019).

## 2018-05-25 LAB
HPV16 AG SPEC QL: NEGATIVE
HPV16+18+H RISK 12 DNA CVX-IMP: NEGATIVE
HPV18 DNA SPEC QL NAA+PROBE: NEGATIVE

## 2018-05-29 ENCOUNTER — HOSPITAL ENCOUNTER (OUTPATIENT)
Dept: RADIOLOGY | Facility: HOSPITAL | Age: 43
Discharge: HOME | End: 2018-05-29
Attending: OBSTETRICS & GYNECOLOGY
Payer: COMMERCIAL

## 2018-05-29 VITALS — HEIGHT: 62 IN | BODY MASS INDEX: 20.98 KG/M2 | WEIGHT: 114 LBS

## 2018-05-29 DIAGNOSIS — N63.0 BREAST MASS: ICD-10-CM

## 2018-05-29 PROCEDURE — 77066 DX MAMMO INCL CAD BI: CPT | Mod: 26,,, | Performed by: RADIOLOGY

## 2018-05-29 PROCEDURE — 76642 ULTRASOUND BREAST LIMITED: CPT | Mod: TC,50,PO

## 2018-05-29 PROCEDURE — 76642 ULTRASOUND BREAST LIMITED: CPT | Mod: 26,50,, | Performed by: RADIOLOGY

## 2018-05-29 PROCEDURE — 77062 BREAST TOMOSYNTHESIS BI: CPT | Mod: 26,,, | Performed by: RADIOLOGY

## 2018-05-29 PROCEDURE — 77062 BREAST TOMOSYNTHESIS BI: CPT | Mod: TC,PO

## 2018-06-05 ENCOUNTER — PATIENT MESSAGE (OUTPATIENT)
Dept: OBSTETRICS AND GYNECOLOGY | Facility: CLINIC | Age: 43
End: 2018-06-05

## 2018-06-05 ENCOUNTER — PATIENT MESSAGE (OUTPATIENT)
Dept: GASTROENTEROLOGY | Facility: CLINIC | Age: 43
End: 2018-06-05

## 2018-06-06 NOTE — PROGRESS NOTES
[facility] Consultation Note    Reason for Consult:    Chief Complaint   Patient presents with    Abdominal Pain    Bloated    Gas    Diarrhea    Constipation    Rectal Bleeding    Weight Loss         PCP:   Ashutosh Kwan       Referring MD:  Pulmonology: Dr. Pate    GI: Dr. Onofre (2017)    HPI:  Olinda Lombardi is a 43 y.o. female with a  PMH of Cystic fibrosis, bronchiectasis 2/2 cystic fibrosis, type 2 DM  2/2 CF referred to motility clinic for second opinion regarding the following problems:    Abdominal pain. Reports abdominal pain  Character:ache  Location:generalized  Frequency: most days weekly   Duration:30 min   Onset:several years  Worse with:worse after meals.  Improves with:pepto bismol, imodium  Associated with Bm: yes  Nocturnal pain: no  Has not tried IBgard, Bentyl, Levsin, Levbid.  .  Antidepressants:zoloft, Trazodone  Using narcotic pain medication: no    Gas and bloating.   Bloating: yes  Excessive gas: yes  Abdominal distension: yes   Symptoms get worse after meals:yes  Symptoms get worse as the day progresses:  yes  Consumes lactose:has milk allergy. Takes lactaid prior to any dairy intak  Consumes artificial sugars:avoids    Diarrhea.  Reports loose to watery stools.    O'Brien: 6-7; usually like soft serve ice cream  Frequency:3 days weekly  Symptoms started: couple years, with worsening in the last few months  Nocturnal symptoms: no  Fecal incontinence: no     No improvement with imodium PRN  No improvement with 3-4 tabs zenpep with meals and 4-5 with snacks  Has not taken lomotil, questran, viberzi    Constipation.  Reports bothersome constipation:  Lumpy and hard, difficult to pass stools:not lumpy or hard, but difficult to pass  Urgency and sensation of incomplete evacuation:yes  Straining during defecation:yes  Sensation of anorectal blockage:no  Rectal prolapse:yes  Fewer than three spontaneous bowel movements per week:no  Frequency:3  days weekly  Symptoms started: couple of years  Uses manual maneuvers to facilitate defecation:no    Problems in early childhood: no  History of perineal trauma: no   Previous evaluation: no  Previous pelvic muscle retraining: no    some improvement with miralax daily.   some improvement with dulcolax PRN, results in BRBPR  Has not tried  Linzess, amitiza, trulance, lactulose, fiber, senna, colace    BRBPR. With mucus. X 8 months. Moderate amt. On TP, in TW, in stool. When taking dulcolax and during menstruation.     Anemia.     Pt reports pancreatic cyst on previous ultrasound    Weight loss. 23 lb intentional wt loss when dx with DM after diet change. Currently wt stable.    Cystic Fibrosis. Dx at 6 months old. Taking Pulmozyme, xopenex, tobramycin, hypertonic saline. Followed by Dr. Pate.  States that she was diagnosed with CF at the age of 6 months when she was evaluated for reflux and decreased weight.  She is a F508/542 del.  States she never truly had respiratory symptoms but was started on inhaler and airway clearance regimen in her 30's.  She has an occasional cough which is productive of green sputum.  Does not produce copious amounts.  She grows Pseudomonas and Staph.  No history of atypical mycobacterial infections.  She has never been hospitalized for CF exacerbation and has never required IV abx.  She has not required po abx for years.  She has never had pneumothoraces or hemoptysis.      DM 2. 2/2 CF dx few years ago. HBA1c 6.1 in 3/2018. BS run in 120s. Taking Fiasp insulin. [facility]     Low vitamin D, vitamin A. Recently started multivitamin    Anxiety. Depression. ADHD. Some improvement with Zoloft 100 mg BID, Adderall XR 15 mg AM and 10 mg afternoon, Xanax 0.5 mg PRN (rarely takes) per Dr. Alanis psych. Has  once weekly phone consults. Has seen therapist in the past.    Insomnia. Pain.  Some improvement with trazodone 50 mg nightly per psych. Has not seen  sleep specialist.    Denies dysphagia, GERD, nausea, vomiting, early satiety,  melena, weight loss    Total visit time was 90 minutes, more than 50% of which was spent in face-to-face counseling with patient regarding symptoms, diagnostic results, prognosis, risks and benefits of treatment options, instructions for management, importance of compliance with chosen treatment options, risk factor reduction, stress reduction, coping strategies.      Previous Studies:   Xray chest 9/8/12: NL    Labs:   Vitamin D low 21  Vit A low 24  Anemia. H/h low 9.8/31.0. Mch low 25.8 mchc low 31.6        ROS:  ROS   Constitutional: No fevers, no chills, + night sweats, no weight loss  ENT: + congestion, no rhinorrhea, no chronic sinus problems  CV: No chest pain, no palpitations  Pulm: + cough, + shortness of breath  Ophtho: No blurry vision, no eye redness  GI: see HPI  Derm: No rash  Heme: No lymphadenopathy, no bruising  MSK: +joint pain, no joint swelling, no Raynauds  : No dysuria, no frequent urination, no blood in urine  Endo: + cold intolerance  Neuro: No dizziness, no syncope, no seizure  Psych: + anxiety, + depression        Medical History:   Past Medical History:   Diagnosis Date    Abnormal Pap smear of vagina     Bilateral carpal tunnel syndrome     Cystic fibrosis     Cystic fibrosis     Diabetes mellitus     CFRD        Surgical History:   Past Surgical History:   Procedure Laterality Date    ADENOIDECTOMY      SINUS SURGERY          Family History:   Family History   Problem Relation Age of Onset    Heart disease Father     Diabetes Mother     Breast cancer Maternal Grandmother     Colon cancer Neg Hx     Ovarian cancer Neg Hx     Celiac disease Neg Hx     Crohn's disease Neg Hx     Esophageal cancer Neg Hx     Inflammatory bowel disease Neg Hx     Irritable bowel syndrome Neg Hx     Liver cancer Neg Hx     Rectal cancer Neg Hx     Stomach cancer Neg Hx     Ulcerative colitis Neg Hx      "    Social History:   Social History     Social History    Marital status:      Spouse name: N/A    Number of children: N/A    Years of education: N/A     Social History Main Topics    Smoking status: Never Smoker    Smokeless tobacco: Never Used    Alcohol use 1.2 oz/week     2 Glasses of wine per week      Comment: 1-2 drinks a week     Drug use: No    Sexual activity: Yes     Partners: Male     Birth control/ protection: None      Comment:       Other Topics Concern    None     Social History Narrative    None        Review of patient's allergies indicates:   Allergen Reactions    Penicillin        Current Outpatient Prescriptions   Medication Sig Dispense Refill    ACZONE 5 % topical gel       alprazolam (XANAX) 0.5 MG tablet Take 0.5 mg by mouth 2 (two) times daily as needed.       CONTOUR NEXT STRIPS Strp       dextroamphetamine-amphetamine (ADDERALL XR) 10 MG 24 hr capsule Take 5-10 mg by mouth once daily. In the afternoon  0    dextroamphetamine-amphetamine (ADDERALL XR) 15 MG 24 hr capsule Take 15 mg by mouth every morning.      dornase alpha (PULMOZYME) 1 mg/mL nebulizer solution INHALE 1 AMPULE (2.5MG) INTO THE LUNGS ONCE DAILY 75 mL 12    HYPER-SAL 7 % nebulizer solution Take 4 mLs by nebulization 2 (two) times daily. 240 mL 12    insulin aspart, niacinamide, (FIASP FLEXTOUCH U-100 INSULIN) 100 unit/mL (3 mL) InPn Inject 10 Units into the skin 3 (three) times daily before meals. 15 mL 5    levocetirizine (XYZAL) 5 MG tablet Take 5 mg by mouth daily as needed.       lipase-protease-amylase 20,000-68,000 -109,000 unit CpDR Take 7 capsules by mouth 3 (three) times daily with meals. 630 capsule 12    multivit,min52-folic-vitK-cQ10 (AQUADEKS) 100-700-10 mcg-mcg-mg Cap cap Take 1 capsule by mouth once daily. 30 capsule 12    NOVOFINE PLUS 32 gauge x 1/6" Ndle       ONABOTULINUMTOXINA (BOTOX COSMETIC INJ) Inject as directed.      sertraline (ZOLOFT) 100 MG tablet 2 (two) " "times daily.       TAZORAC 0.05 % Crea cream       tobramycin 300 mg/4 mL Nebu Inhale 300 mg into the lungs 2 (two) times daily. 240 mL 11    tobramycin, PF, (GAMA) 300 mg/5 mL nebulizer solution INHALE 1 AMPULE (300 MG) INTO THE LUNGS TWO TIMES A DAY. 28 DAYS ON THEN 28 DAYS  mL 11    trazodone (DESYREL) 150 MG tablet nightly as needed.       XOPENEX HFA 45 mcg/actuation inhaler Inhale 1-2 puffs into the lungs every 4 (four) hours as needed for Wheezing. 1 Inhaler 12    sodium,potassium,mag sulfates (SUPREP BOWEL PREP KIT) 17.5-3.13-1.6 gram SolR Mix with liquid and take by mouth once 354 mL 0     No current facility-administered medications for this visit.         Objective Findings:  Vital Signs:  /80   Pulse 90   Ht 5' 1" (1.549 m)   Wt 53.4 kg (117 lb 11.6 oz)   LMP 05/22/2018   BMI 22.24 kg/m²   Body mass index is 22.24 kg/m².    Physical Exam:  General appearance: alert, cooperative, no distress  HENT: Normocephalic, atraumatic, neck symmetrical, no nasal discharge  Eyes: conjunctivae/corneas clear, PERRL, EOM's intact  Lungs: clear to auscultation bilaterally, no dullness to percussion bilaterally  Heart: regular rate and rhythm without rub; no displacement of the PMI  Abdomen: soft, mild right sided tenderness; bowel sounds normoactive; no organomegaly  Extremities: extremities symmetric; no clubbing, cyanosis, or edema  Integument: Skin color, texture, turgor normal; no rashes; hair distrubution normal  Neurologic: Alert and oriented X 3, normal strength, normal coordination and gait  Psychiatric: no pressured speech; normal affect; no evidence of impaired cognition    RECTAL EXAM:  Hemorrhoids: no  Tenderness: some  Skin tags: yes  Fissure: no  Prolapse on beardown: no  No Midline scar  Rectocele: no  SENSATION:  Normal sensation: yes  Anal reflex: normal  ANAL SPHINCTER   Normal resting tone: yes  Squeeze pressure: normal  BEAR DOWN:  Abdominal pressure:normal  Perineal descent: " "normal  Relaxation of EAS: no  Stool in volt: no      Labs:  Lab Results   Component Value Date    WBC 4.40 03/13/2018    HGB 9.8 (L) 03/13/2018    HCT 31.0 (L) 03/13/2018    MCV 82 03/13/2018     03/13/2018     Lab Results   Component Value Date    FERRITIN 5 (L) 06/07/2018     Lab Results   Component Value Date     03/13/2018    K 3.6 03/13/2018     03/13/2018    CO2 26 03/13/2018     (H) 03/13/2018    BUN 10 03/13/2018    CREATININE 0.7 03/13/2018    CALCIUM 9.3 03/13/2018    PROT 6.9 03/13/2018    ALBUMIN 3.6 03/13/2018    BILITOT 0.3 03/13/2018    ALKPHOS 77 03/13/2018    AST 16 03/13/2018    ALT 14 03/13/2018     Lab Results   Component Value Date    TSH 1.380 06/07/2018     No results found for: SEDRATE  No results found for: CRP  Lab Results   Component Value Date    HGBA1C 6.1 (H) 03/13/2018     Lab Results   Component Value Date    LIPASE <5 09/08/2012           Assessment and Plan:  Olinda Lombardi is a 43 y.o. female with a  PMH of Cystic fibrosis, bronchiectasis 2/2 cystic fibrosis, type 2 DM  2/2 CF referred to motility clinic for second opinion regarding the following problems:    Abdominal pain. Associated with bowel movements.  On zoloft, trazodone  -Check labs  -EGD with g/d bx/colon w r/l bx  -Will consider IBgard, Bentyl    Gas and bloating. Distention.   Takes lactaid prior to lactose consumption due to "milk allergy".   Avoids artificial sugars.  -Will consider SIBO testing     Constipation.  Some evidence of pelvic floor dysfunction on rectal exam.  Some improvement with dulcolax PRN (results in BRBPR)  -Take Miralax once-twice daily.  -Check labs  -Will consider MR defecography  -Will consider linzess    Diarrhea. Possibly overflow  No improvement with imodium   No improvement with Zenpep  -Check labs  -Colonoscopy with r/l bx    Pancreatic insufficiency vs sufficiency   -On zenpep 3-4 tabs with meals and 4-5 with snacks  -Check pan elastace     BRBPR. Worse " with dulcolax and during menstruation. External skin tags on rectal exam.  -Colonoscopy    Anemia.   -Check labs  -EGD/colonoscopy    Pt reports pancreatic cyst on previous ultrasound.  -Check abdominal ultrasound    Cystic Fibrosis (F508/542 del)  Taking Pulmozyme, xopenex, tobramycin, hypertonic saline.   -Followed by Dr. Pate    CFRDM.  HBA1c 6.1. BS run in 120s.   Taking Fiasp insulin.   -Followed by endocrinology     Vitamin D deficiency   -PT will let us know the dose of Vit D in multivitamin     Low vitamin A.   -Recently started multivitamin  -Will need to monitor     Anxiety. Depression. ADHD. Some improvement with Zoloft 100 mg BID, Adderall XR 15 mg AM and 10 mg afternoon, Xanax 0.5 mg PRN   -Followed by psychiatrist Dr. Lozada   -Speaks to  once weekly    Insomnia. Related to pain.    Some improvement with trazodone 50 mg nightly per psychiatrist.    Follow-up in about 4 months (around 10/7/2018) for Motility with Dr. Shultz after procedures.    1. Abdominal pain, generalized    2. Diarrhea, unspecified type    3. Constipation, unspecified constipation type    4. Abdominal bloating    5. Iron deficiency anemia, unspecified iron deficiency anemia type    6. BRBPR (bright red blood per rectum)          Order summary:  Orders Placed This Encounter    US Abdomen Complete    TISSUE TRANSGLUTAMINASE (TTG), IGA    IgA    TSH    Iron and TIBC    Ferritin    Pancreatic elastase, fecal    Case request GI: EGD (ESOPHAGOGASTRODUODENOSCOPY), COLONOSCOPY         Thank you so much for allowing me to participate in the care of Olinda Lombardi    AMOS Olmstead, FNP-C    I have personally reviewed history, performed physical exam, and educated the patient.  I have reviewed and agree with today's findings and the care plan outlined by Biis Engel NP.     Azalia Shultz MD

## 2018-06-07 ENCOUNTER — LAB VISIT (OUTPATIENT)
Dept: LAB | Facility: HOSPITAL | Age: 43
End: 2018-06-07
Payer: COMMERCIAL

## 2018-06-07 ENCOUNTER — OFFICE VISIT (OUTPATIENT)
Dept: GASTROENTEROLOGY | Facility: CLINIC | Age: 43
End: 2018-06-07
Payer: COMMERCIAL

## 2018-06-07 ENCOUNTER — TELEPHONE (OUTPATIENT)
Dept: ENDOSCOPY | Facility: HOSPITAL | Age: 43
End: 2018-06-07

## 2018-06-07 VITALS
HEART RATE: 90 BPM | WEIGHT: 117.75 LBS | SYSTOLIC BLOOD PRESSURE: 121 MMHG | HEIGHT: 61 IN | BODY MASS INDEX: 22.23 KG/M2 | DIASTOLIC BLOOD PRESSURE: 80 MMHG

## 2018-06-07 DIAGNOSIS — R19.7 DIARRHEA, UNSPECIFIED TYPE: ICD-10-CM

## 2018-06-07 DIAGNOSIS — K62.5 BRBPR (BRIGHT RED BLOOD PER RECTUM): ICD-10-CM

## 2018-06-07 DIAGNOSIS — R10.84 ABDOMINAL PAIN, GENERALIZED: ICD-10-CM

## 2018-06-07 DIAGNOSIS — R14.0 ABDOMINAL BLOATING: ICD-10-CM

## 2018-06-07 DIAGNOSIS — Z12.11 SPECIAL SCREENING FOR MALIGNANT NEOPLASMS, COLON: Primary | ICD-10-CM

## 2018-06-07 DIAGNOSIS — R10.84 ABDOMINAL PAIN, GENERALIZED: Primary | ICD-10-CM

## 2018-06-07 DIAGNOSIS — K59.00 CONSTIPATION, UNSPECIFIED CONSTIPATION TYPE: ICD-10-CM

## 2018-06-07 DIAGNOSIS — D50.9 IRON DEFICIENCY ANEMIA, UNSPECIFIED IRON DEFICIENCY ANEMIA TYPE: ICD-10-CM

## 2018-06-07 LAB
FERRITIN SERPL-MCNC: 5 NG/ML
IGA SERPL-MCNC: 158 MG/DL
IRON SERPL-MCNC: 20 UG/DL
SATURATED IRON: 4 %
TOTAL IRON BINDING CAPACITY: 545 UG/DL
TRANSFERRIN SERPL-MCNC: 368 MG/DL
TSH SERPL DL<=0.005 MIU/L-ACNC: 1.38 UIU/ML

## 2018-06-07 PROCEDURE — 82728 ASSAY OF FERRITIN: CPT

## 2018-06-07 PROCEDURE — 99999 PR PBB SHADOW E&M-EST. PATIENT-LVL V: CPT | Mod: PBBFAC,,, | Performed by: NURSE PRACTITIONER

## 2018-06-07 PROCEDURE — 83516 IMMUNOASSAY NONANTIBODY: CPT

## 2018-06-07 PROCEDURE — 3008F BODY MASS INDEX DOCD: CPT | Mod: CPTII,S$GLB,, | Performed by: NURSE PRACTITIONER

## 2018-06-07 PROCEDURE — 83540 ASSAY OF IRON: CPT

## 2018-06-07 PROCEDURE — 99215 OFFICE O/P EST HI 40 MIN: CPT | Mod: S$GLB,,, | Performed by: NURSE PRACTITIONER

## 2018-06-07 PROCEDURE — 82784 ASSAY IGA/IGD/IGG/IGM EACH: CPT

## 2018-06-07 PROCEDURE — 36415 COLL VENOUS BLD VENIPUNCTURE: CPT

## 2018-06-07 PROCEDURE — 84443 ASSAY THYROID STIM HORMONE: CPT

## 2018-06-07 RX ORDER — SODIUM, POTASSIUM,MAG SULFATES 17.5-3.13G
1 SOLUTION, RECONSTITUTED, ORAL ORAL ONCE
Qty: 354 ML | Refills: 0 | Status: SHIPPED | OUTPATIENT
Start: 2018-06-07 | End: 2018-06-08

## 2018-06-07 NOTE — LETTER
June 8, 2018        Chadwick Pate MD  [address] [facility] [facility] [address]             [facility] Gastroenterology  [address]  [facility] [address]  Phone: [phone]  Fax: [phone]   Patient: Olinda Lombardi   MR Number: [id number]   YOB: 1975   Date of Visit: 6/7/2018       Dear Dr. Pate:    Thank you for referring Olinda Lombardi to me for evaluation. Attached you will find relevant portions of my assessment and plan of care.    If you have questions, please do not hesitate to call me. I look forward to following Olinda Lombardi along with you.    Sincerely,      Cherelle Shultz M.D.  Medical Director, GI Motility  Department of Gastroenterology    AMOS Watts, FNP-C  GI Motility  Department of Gastroenterology            CC  No Recipients    Enclosure

## 2018-06-11 LAB — TTG IGA SER IA-ACNC: 5 UNITS

## 2018-06-12 ENCOUNTER — LAB VISIT (OUTPATIENT)
Dept: LAB | Facility: HOSPITAL | Age: 43
End: 2018-06-12
Payer: COMMERCIAL

## 2018-06-12 ENCOUNTER — HOSPITAL ENCOUNTER (OUTPATIENT)
Dept: PULMONOLOGY | Facility: CLINIC | Age: 43
Discharge: HOME | End: 2018-06-12
Payer: COMMERCIAL

## 2018-06-12 ENCOUNTER — PATIENT MESSAGE (OUTPATIENT)
Dept: ENDOSCOPY | Facility: HOSPITAL | Age: 43
End: 2018-06-12

## 2018-06-12 ENCOUNTER — PATIENT MESSAGE (OUTPATIENT)
Dept: TRANSPLANT | Facility: CLINIC | Age: 43
End: 2018-06-12

## 2018-06-12 ENCOUNTER — PATIENT MESSAGE (OUTPATIENT)
Dept: GASTROENTEROLOGY | Facility: CLINIC | Age: 43
End: 2018-06-12

## 2018-06-12 ENCOUNTER — OFFICE VISIT (OUTPATIENT)
Dept: TRANSPLANT | Facility: CLINIC | Age: 43
End: 2018-06-12
Payer: COMMERCIAL

## 2018-06-12 VITALS
HEART RATE: 90 BPM | RESPIRATION RATE: 20 BRPM | OXYGEN SATURATION: 100 % | BODY MASS INDEX: 22.66 KG/M2 | TEMPERATURE: 97 F | WEIGHT: 120 LBS | DIASTOLIC BLOOD PRESSURE: 76 MMHG | SYSTOLIC BLOOD PRESSURE: 131 MMHG | HEIGHT: 61 IN

## 2018-06-12 DIAGNOSIS — E08.9 DIABETES MELLITUS RELATED TO CYSTIC FIBROSIS: ICD-10-CM

## 2018-06-12 DIAGNOSIS — E84.19 CYSTIC FIBROSIS WITH GASTROINTESTINAL MANIFESTATIONS: ICD-10-CM

## 2018-06-12 DIAGNOSIS — E84.9 CYSTIC FIBROSIS: Primary | ICD-10-CM

## 2018-06-12 DIAGNOSIS — R10.9 ACUTE LEFT FLANK PAIN: ICD-10-CM

## 2018-06-12 DIAGNOSIS — E84.9 CYSTIC FIBROSIS: ICD-10-CM

## 2018-06-12 DIAGNOSIS — E84.8 DIABETES MELLITUS RELATED TO CYSTIC FIBROSIS: ICD-10-CM

## 2018-06-12 DIAGNOSIS — K86.89 PANCREATIC INSUFFICIENCY: ICD-10-CM

## 2018-06-12 LAB
25(OH)D3+25(OH)D2 SERPL-MCNC: 18 NG/ML
BACTERIA #/AREA URNS AUTO: ABNORMAL /HPF
BILIRUB UR QL STRIP: NEGATIVE
CLARITY UR REFRACT.AUTO: ABNORMAL
COLOR UR AUTO: YELLOW
ESTIMATED AVG GLUCOSE: 131 MG/DL
GLUCOSE UR QL STRIP: NEGATIVE
HBA1C MFR BLD HPLC: 6.2 %
HGB UR QL STRIP: NEGATIVE
KETONES UR QL STRIP: NEGATIVE
LEUKOCYTE ESTERASE UR QL STRIP: NEGATIVE
MICROSCOPIC COMMENT: ABNORMAL
NITRITE UR QL STRIP: NEGATIVE
PH UR STRIP: 6 [PH] (ref 5–8)
PRE FEV1 FVC: 66
PRE FEV1: 2.04
PRE FVC: 3.09
PREDICTED FEV1 FVC: 86
PREDICTED FEV1: 2.56
PREDICTED FVC: 3.02
PROT UR QL STRIP: NEGATIVE
RBC #/AREA URNS AUTO: 3 /HPF (ref 0–4)
SP GR UR STRIP: 1.01 (ref 1–1.03)
SQUAMOUS #/AREA URNS AUTO: 7 /HPF
URN SPEC COLLECT METH UR: ABNORMAL
UROBILINOGEN UR STRIP-ACNC: NEGATIVE EU/DL
WBC #/AREA URNS AUTO: 1 /HPF (ref 0–5)

## 2018-06-12 PROCEDURE — 81001 URINALYSIS AUTO W/SCOPE: CPT

## 2018-06-12 PROCEDURE — 82306 VITAMIN D 25 HYDROXY: CPT

## 2018-06-12 PROCEDURE — 84597 ASSAY OF VITAMIN K: CPT

## 2018-06-12 PROCEDURE — 84446 ASSAY OF VITAMIN E: CPT

## 2018-06-12 PROCEDURE — 3008F BODY MASS INDEX DOCD: CPT | Mod: CPTII,S$GLB,, | Performed by: PHYSICIAN ASSISTANT

## 2018-06-12 PROCEDURE — 99213 OFFICE O/P EST LOW 20 MIN: CPT | Mod: 25,S$GLB,, | Performed by: PHYSICIAN ASSISTANT

## 2018-06-12 PROCEDURE — 84590 ASSAY OF VITAMIN A: CPT

## 2018-06-12 PROCEDURE — 99999 PR PBB SHADOW E&M-EST. PATIENT-LVL III: CPT | Mod: PBBFAC,,, | Performed by: INTERNAL MEDICINE

## 2018-06-12 PROCEDURE — 36415 COLL VENOUS BLD VENIPUNCTURE: CPT

## 2018-06-12 PROCEDURE — 83036 HEMOGLOBIN GLYCOSYLATED A1C: CPT

## 2018-06-12 PROCEDURE — 94010 BREATHING CAPACITY TEST: CPT | Mod: S$GLB,,, | Performed by: INTERNAL MEDICINE

## 2018-06-12 NOTE — PROGRESS NOTES
"LUNG TRANSPLANT PRE FOLLOW-UP    Referring Physician:  Self-Referral    Reason for Visit:  Pre-lung transplant follow-up.         Date of Initial Evaluation:                                                                                              History of Present Illness: Olinda Lombardi is a 43 y.o. female who is on 0L of oxygen. She is on no assisted ventilation.  Her New York Heart Association Class is I and a Karnofsky score of 90% - Able to carry on normal activity: minor symptoms of disease. She is diabetic insulin dependent.  Patient reports productive cough with green to yellow sputum that is most notable in the morning. Denies FRANKLIN, hemoptysis, chest pain. Patient reports non-compliance with inhalation meds and states that she only uses Pulmozyme and Hypertonic Saline 1-2 times a week. Patient reports starting to take her Aquadek vitamins regularly over the last week. Patient reports taking 3-6 capsules of Zenpep throughout the day with meals. Patient takes Patient continues to report various GI complaints of mixed constipation and diarrhea episodes, currently using Miralax daily and Dulcolax PRN. Patient reports acute left flank pain that started 2-3 days ago. Patient denies dysuria, hematuria, urinary urgency/frequency. Patient reports prior history of 2-3 UTIs over the last several years.         /76   Pulse 90   Temp 97.4 °F (36.3 °C) (Oral)   Resp 20   Ht 5' 1" (1.549 m)   Wt 54.4 kg (120 lb)   LMP 05/22/2018   SpO2 100%   BMI 22.67 kg/m²   Review of Systems   Constitutional: Negative for chills, diaphoresis, fever, malaise/fatigue and weight loss.   HENT: Negative for congestion, ear discharge, ear pain, hearing loss, nosebleeds, sinus pain, sore throat and tinnitus.    Eyes: Negative for blurred vision, double vision, photophobia, pain, discharge and redness.   Respiratory: Positive for cough and sputum production (yellow to green). Negative for hemoptysis, shortness of " breath, wheezing and stridor.    Cardiovascular: Negative for chest pain, palpitations, orthopnea, claudication, leg swelling and PND.   Gastrointestinal: Positive for constipation, diarrhea and nausea. Negative for abdominal pain, blood in stool, heartburn, melena and vomiting.   Genitourinary: Positive for flank pain (left sided, x2-3 days). Negative for dysuria, frequency, hematuria and urgency.   Musculoskeletal: Negative for back pain, falls, joint pain, myalgias and neck pain.   Skin: Negative for itching and rash.   Neurological: Negative for dizziness, tingling, tremors, sensory change, speech change, focal weakness, seizures, loss of consciousness, weakness and headaches.   Endo/Heme/Allergies: Negative for environmental allergies and polydipsia. Does not bruise/bleed easily.   Psychiatric/Behavioral: Negative for depression, hallucinations, memory loss, substance abuse and suicidal ideas. The patient has insomnia (chronic, controlled with trazodone). The patient is not nervous/anxious.      Objective:   Physical Exam   Constitutional: She is oriented to person, place, and time and well-developed, well-nourished, and in no distress. No distress.   HENT:   Head: Normocephalic and atraumatic.   Nose: Nose normal.   Mouth/Throat: Oropharynx is clear and moist. No oropharyngeal exudate.   Eyes: Conjunctivae and EOM are normal. Pupils are equal, round, and reactive to light. Right eye exhibits no discharge. Left eye exhibits no discharge. No scleral icterus.   Neck: Normal range of motion. Neck supple. No JVD present. No tracheal deviation present. No thyromegaly present.   Cardiovascular: Normal rate, regular rhythm, normal heart sounds and intact distal pulses.  Exam reveals no gallop and no friction rub.    No murmur heard.  Pulmonary/Chest: Effort normal and breath sounds normal. No stridor. No respiratory distress. She has no wheezes. She has no rales. She exhibits no tenderness.   Abdominal: Bowel sounds  are normal. She exhibits no distension. There is no tenderness. There is no guarding.   Musculoskeletal: Normal range of motion. She exhibits no edema, tenderness or deformity.   Lymphadenopathy:     She has no cervical adenopathy.   Neurological: She is oriented to person, place, and time. No cranial nerve deficit. Gait normal. Coordination normal. GCS score is 15.   Skin: Skin is dry. No rash noted. She is not diaphoretic. No erythema. No pallor.   Psychiatric: Mood and affect normal.     Labs:  Lab Visit on 06/07/2018   Component Date Value    TTG IgA 06/07/2018 5     IgA 06/07/2018 158     TSH 06/07/2018 1.380     Iron 06/07/2018 20*    Transferrin 06/07/2018 368     TIBC 06/07/2018 545*    Saturated Iron 06/07/2018 4*    Ferritin 06/07/2018 5*       Pulmonary Function Tests 6/12/2018 3/13/2018 8/31/2016 8/5/2015 10/29/2014   FVC 3.09 3.07 3.15 3.29 3.26   FEV1 2.04 2.01 2.09 2.14 2.16   FVC% 103 98 94 98 97   FEV1% 80 77 76 77 77   FEF 25-75 1.11 1.08 1.13 98 1.12   FEF 25-75% 38 36 38 32 36     Other: No flowsheet data found.        Assessment:-  1. Cystic fibrosis    2. Acute left flank pain    3. Pancreatic insufficiency    4. 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 Cystic fibrosis with gastrointestinal manifestations      Plan:  1. Continued to encourage compliance with inhalation treatments with Pulmozyme, Hypersaline, Xopenex, and Tobramycin. Recommended patient use Xopenex prior to Hypersaline to prevent hypersaline associated bronchospasm. Encourage use of azithromycin TIW for anti-inflammatory properties in lungs. Spirometry stable today with FEV1 2.04. Clinically stable from respiratory standpoint.     2. Urinalysis ordered - results pending. Continue Ibuprofen PRN pain. Encouraged oral hydration.     3. Continue Zenpep. Repeat Vitamin A, D, E, K levels.     4. Most recent Hgb A1c was 6.1 in March 2018. Follows with Dr. Lester with Endocrine. Will repeat Hgb A1c today.     5. Patient  stated concern for anesthesia for EGD/Colonoscopy per GI. Strongly recommended that patient continue to follow with GI and undergo colonoscopy as latest CF guidelines recommend colonoscopy screening starting at age 40 given this patient population's increased risk of colon cancer. Patient also reports taking iron supplement OTC for iron deficiency anemia.     6. Follow up in clinic in 3 months or earlier if needed.     Mónica Larson PA-C  Lung Transplant

## 2018-06-13 DIAGNOSIS — E84.9 CYSTIC FIBROSIS: Primary | ICD-10-CM

## 2018-06-15 ENCOUNTER — PATIENT MESSAGE (OUTPATIENT)
Dept: ENDOCRINOLOGY | Facility: CLINIC | Age: 43
End: 2018-06-15

## 2018-06-15 ENCOUNTER — TELEPHONE (OUTPATIENT)
Dept: TRANSPLANT | Facility: HOSPITAL | Age: 43
End: 2018-06-15

## 2018-06-15 ENCOUNTER — TELEPHONE (OUTPATIENT)
Dept: PHARMACY | Facility: CLINIC | Age: 43
End: 2018-06-15

## 2018-06-15 ENCOUNTER — LAB VISIT (OUTPATIENT)
Dept: LAB | Facility: HOSPITAL | Age: 43
End: 2018-06-15
Payer: COMMERCIAL

## 2018-06-15 DIAGNOSIS — R19.7 DIARRHEA, UNSPECIFIED TYPE: ICD-10-CM

## 2018-06-15 DIAGNOSIS — R14.0 ABDOMINAL BLOATING: ICD-10-CM

## 2018-06-15 LAB
A-TOCOPHEROL VIT E SERPL-MCNC: 1284 UG/DL (ref 500–1800)
VIT A SERPL-MCNC: 32 UG/DL (ref 38–106)

## 2018-06-15 PROCEDURE — 82656 EL-1 FECAL QUAL/SEMIQ: CPT

## 2018-06-15 NOTE — TELEPHONE ENCOUNTER
Referral made to [facility] (Frankie- ph [phone]/ fx [phone]) for percussion vest. Pt relayed message to JOHNY calvo re: specific vest pt prefers. SW reaching out to [facility] to inquire if vest if supplied through company/covered by pt's insurance. Additional order needing to be signed by Dr. Pate per Frankie. MD out of town until Monday. SW to have signed and returned to [facility].     Referral also made to [facility] for Nebulizer handset (ph [phone]).    SW remains available.

## 2018-06-15 NOTE — TELEPHONE ENCOUNTER
"Patient confirmed start date 18. She admits to not being adherent to either the Tobramycin or the Pulmozyme regimens. She reports that she has already discussed her frequency of use with Dr. Pate.     The tobramycin she is not using at all due to the long treatment time. Because she can not commit to using it regularly, she has fears of developing resistance if she uses it sporadically. She is also concerned about side effects including hearing loss.      The pulmozyme she uses on average twice weekly, whenever she is "starting to feel really bad." She accepted a refill of her pulmozyme (Copay $0 at 004, will ship via [employer] to arrive 18). We discussed the importance of consistent usage and long-term benefits of therapy.    Consultation included: indication; goals of treatment; administration; storage and handling; side effects; the importance of compliance; the importance of keeping all follow up appointments. No new medications/allergies/medical conditions.No Urgent Care visits. Patient deferred several questions due to time constraints. All questions answered and addressed to patients satisfaction. [facility] will contact patient in 4 weeks to coordinate next refill. Patient verbalized understanding. Confirmed 2 patient identifiers - name and     Dede Arteaga, PharmD  Specialty Pharmacy Clinical Pharmacist  [facility]  P: [phone]         "

## 2018-06-17 ENCOUNTER — PATIENT MESSAGE (OUTPATIENT)
Dept: TRANSPLANT | Facility: CLINIC | Age: 43
End: 2018-06-17

## 2018-06-17 LAB — PHYTONADIONE SERPL-MCNC: 2.49 NMOL/L (ref 0.22–4.88)

## 2018-06-19 DIAGNOSIS — E84.9 CYSTIC FIBROSIS: Primary | ICD-10-CM

## 2018-06-22 ENCOUNTER — PATIENT MESSAGE (OUTPATIENT)
Dept: GASTROENTEROLOGY | Facility: CLINIC | Age: 43
End: 2018-06-22

## 2018-06-22 ENCOUNTER — OFFICE VISIT (OUTPATIENT)
Dept: ENDOCRINOLOGY | Facility: CLINIC | Age: 43
End: 2018-06-22
Payer: COMMERCIAL

## 2018-06-22 VITALS
SYSTOLIC BLOOD PRESSURE: 110 MMHG | DIASTOLIC BLOOD PRESSURE: 72 MMHG | BODY MASS INDEX: 21.72 KG/M2 | WEIGHT: 115.06 LBS | HEIGHT: 61 IN

## 2018-06-22 DIAGNOSIS — E84.9 CYSTIC FIBROSIS: ICD-10-CM

## 2018-06-22 DIAGNOSIS — E08.9 DIABETES MELLITUS RELATED TO CYSTIC FIBROSIS: Primary | ICD-10-CM

## 2018-06-22 DIAGNOSIS — E84.8 DIABETES MELLITUS RELATED TO CYSTIC FIBROSIS: Primary | ICD-10-CM

## 2018-06-22 LAB
ELASTASE 1, FECAL: <50 UG E/G STOOL
ELASTASE INTERPRETATION: ABNORMAL

## 2018-06-22 PROCEDURE — 99213 OFFICE O/P EST LOW 20 MIN: CPT | Mod: S$GLB,,, | Performed by: INTERNAL MEDICINE

## 2018-06-22 PROCEDURE — 3008F BODY MASS INDEX DOCD: CPT | Mod: CPTII,S$GLB,, | Performed by: INTERNAL MEDICINE

## 2018-06-22 PROCEDURE — 99999 PR PBB SHADOW E&M-EST. PATIENT-LVL III: CPT | Mod: PBBFAC,,, | Performed by: INTERNAL MEDICINE

## 2018-06-22 NOTE — PROGRESS NOTES
Subjective:      Patient ID: Michelle Lombardi is a 43 y.o. female.    Chief Complaint:  Diabetes      History of Present Illness  Ms. Lombardi presents for follow up of Cystic Fibrosis related diabetes     Has active history of cystic fibrosis and cystic fibrosis related diabetes.     CFRD diagnosed in 2014/2015. Was previously followed by [facility] endocrinology.     Diabetes Complications:  Denies numbness and tingling in feet   Last eye exam 8/2016 and due for repeat now  No recent urine micro on file     Current Diabetes Regimen:  Fiasp at 1:18 carb ratio (2-4 units with each meal)  No basal needs     Reports full compliance with diabetes regimen.     Current Self Reported Glucoses (checks glucoses over 4 times per day)  Pre meal: majority in the low 100's, occasional hypoglycemia as below     Still gets occasional post prandial hypoglycemia. With glucoses occasionally in the 40's. Able to recognize and treat symptoms.     Eats 2 meals per day, only gives insulin if she eats.     Review of Systems   Constitutional: Negative for chills and fever.   Gastrointestinal: Negative for nausea.       Objective:   Physical Exam   Nursing note and vitals reviewed.  injection sites are ok. No lipo hypertropthy or atrophy    Lab Review:   Results for MICHELLE LOMBARDI (MRN [id number]) as of 6/22/2018 07:31   Ref. Range 3/13/2018 09:17 6/7/2018 11:04 6/12/2018 11:43   Hemoglobin A1C Latest Ref Range: 4.0 - 5.6 % 6.1 (H)  6.2 (H)   Estimated Avg Glucose Latest Ref Range: 68 - 131 mg/dL 128  131   TSH Latest Ref Range: 0.400 - 4.000 uIU/mL  1.380        Assessment:     1. 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 Cystic fibrosis        Plan:     --Patient with CF and CF related diabetes  --Will continue prandial insulin only at this time  --No basal insulin needsp  --Will continue Fiasp at 1:18 ICHO with meals plus correction scale  --Patient to continue checking glucoses at least 4 times daily  --Now being followed  by lung transplant for CF  --Referral to optometry for eye exam  --Referral to diabetes educator for Dexcom since she has severe hypoglycemia     RTC in 6 months with labs prior to appt     Zackery Lester M.D. Staff Endocrinology

## 2018-06-23 ENCOUNTER — PATIENT MESSAGE (OUTPATIENT)
Dept: GASTROENTEROLOGY | Facility: CLINIC | Age: 43
End: 2018-06-23

## 2018-06-25 ENCOUNTER — PATIENT MESSAGE (OUTPATIENT)
Dept: ENDOSCOPY | Facility: HOSPITAL | Age: 43
End: 2018-06-25

## 2018-06-25 ENCOUNTER — PATIENT MESSAGE (OUTPATIENT)
Dept: TRANSPLANT | Facility: CLINIC | Age: 43
End: 2018-06-25

## 2018-06-25 ENCOUNTER — PATIENT MESSAGE (OUTPATIENT)
Dept: GASTROENTEROLOGY | Facility: CLINIC | Age: 43
End: 2018-06-25

## 2018-06-25 ENCOUNTER — PATIENT MESSAGE (OUTPATIENT)
Dept: ENDOCRINOLOGY | Facility: CLINIC | Age: 43
End: 2018-06-25

## 2018-06-25 ENCOUNTER — PATIENT MESSAGE (OUTPATIENT)
Dept: OBSTETRICS AND GYNECOLOGY | Facility: CLINIC | Age: 43
End: 2018-06-25

## 2018-06-25 NOTE — TELEPHONE ENCOUNTER
Spoke with Olinda    She reports her temperature now is 99.1 oral.  When checked with second thermometer it is 98.5.  Reports thermometer was just bought it on Saturday.     Reports intermittent abdominal pain that feels like gas.  Reports pain is 3/10 currently.  Also reports metallic taste in mouth as well.

## 2018-06-26 ENCOUNTER — TELEPHONE (OUTPATIENT)
Dept: OBSTETRICS AND GYNECOLOGY | Facility: CLINIC | Age: 43
End: 2018-06-26

## 2018-06-26 ENCOUNTER — PATIENT MESSAGE (OUTPATIENT)
Dept: ENDOSCOPY | Facility: HOSPITAL | Age: 43
End: 2018-06-26

## 2018-06-26 ENCOUNTER — CLINICAL SUPPORT (OUTPATIENT)
Dept: DIABETES | Facility: CLINIC | Age: 43
End: 2018-06-26
Payer: COMMERCIAL

## 2018-06-26 ENCOUNTER — PATIENT OUTREACH (OUTPATIENT)
Dept: DIABETES | Facility: CLINIC | Age: 43
End: 2018-06-26

## 2018-06-26 ENCOUNTER — PATIENT MESSAGE (OUTPATIENT)
Dept: DIABETES | Facility: CLINIC | Age: 43
End: 2018-06-26

## 2018-06-26 DIAGNOSIS — E84.9 CYSTIC FIBROSIS: ICD-10-CM

## 2018-06-26 DIAGNOSIS — E08.9 DIABETES MELLITUS RELATED TO CYSTIC FIBROSIS: ICD-10-CM

## 2018-06-26 DIAGNOSIS — E84.8 DIABETES MELLITUS RELATED TO CYSTIC FIBROSIS: ICD-10-CM

## 2018-06-26 PROCEDURE — G0108 DIAB MANAGE TRN  PER INDIV: HCPCS | Mod: S$GLB,,, | Performed by: INTERNAL MEDICINE

## 2018-06-26 RX ORDER — FERROUS SULFATE 325(65) MG
325 TABLET ORAL 2 TIMES DAILY
Qty: 60 TABLET | Refills: 6 | Status: SHIPPED | OUTPATIENT
Start: 2018-06-26 | End: 2018-07-26

## 2018-06-26 NOTE — TELEPHONE ENCOUNTER
Favian Jordan MD   You 24 minutes ago (9:46 AM)      Please call this patient as this is an extensive email.  If she would like to discuss in person with me please schedule appointment.  Also please let her know that Dr Oliva is doing hormone/perimenopausal and menopause consultations of which I feel she would serve her best.  I recommend she make hormone consult with Dr Oliva. Thanks (Routing comment)      Left  for return call

## 2018-06-26 NOTE — TELEPHONE ENCOUNTER
My email to pt    These symptoms could be a sign of infection.  Please communicate with your primary care doctor and Dr. Pate about these symptoms so that they can work this up and make sure you are not infected.  If there is any kind of infection going on we should postpone the procedure until it gets addressed. We do want do get this done soon as you are anemic and your iron levels are low, but we need input from the other doctors to make sure it is safe to proceed.     Please discuss the vitamin levels and pancreatic enzymes with the dietitian at the upcoming appointment that was suppose to be set up by Dr. Pate.      I will send a script for iron twice daily tor your pharmacy.     Please let ups know if your primary care doctor and Dr. Pate recommend that you should reschedule the procedures.     Sincerely,   Dr. Shultz     See email

## 2018-06-26 NOTE — TELEPHONE ENCOUNTER
Spoke to pt and advised per Dr. Joradn. Pt has cystic fibrosis and cystic fibrosis related diabetes and is nervous because she is scheduled to have an EGD on Friday after her pancreatic elastase level came back less than 50. She has sent the same portal message to her endocrinologist, gastroenterologist, primary, and transplant doctors. After speaking to the pt, she states she has an appointment + u/s with Dr. Jordan scheduled for 7/3/18 for irregular bleeding and spotting in between periods, but is wondering if Dr. Jordan will check her hormone levels before that. Notified pt I will return call after I speak with Dr. Jordan. Pt verbalized understanding.

## 2018-06-26 NOTE — TELEPHONE ENCOUNTER
That's is exactly why I wanted her to see Dr Oliva.  She checks and follows hormone levels and I feel she will better serve the patient regarding that issue.  Keep appt with me for ultrasound but I recommend she see Hazel for hormone issue.

## 2018-06-26 NOTE — PROGRESS NOTES
Diabetes Education  Author: Rosa Maria Hicsk RD, CDE  Date: 6/26/2018    Diabetes Education Visit  Diabetes Education Record Assessment/Progress: Initial    Fiasp 2-4 units/meal, 3-5 times per day. Some 2 hr pp correction with fiasp if BG > 275 mg/dl. Suggested avoiding correction unless it has been 4 hours since last dose of fiasp. She boluses using ICR of 1:18 gm. Does not use correction factor. Recent labs showed iron and vitamin D deficiency. Provided her information on increasing iron absorption and well as dietary information related to CF. Besides for this past Saturday and Sunday, she does not have anything written down as to her dosing of fiasp, it is difficult to understand her dosing regimen. She is very fearful of lows and begins feels symptoms when BG <110 mg/dl.     Doses either before or after meal. Interested in dexcom. Sent her AOB form to fill out and send back. Messaged staff to route notes, a1c to Dexcom.     Diabetes Type  Diabetes Type : Other (Cystic fibrosis related diabetes)    Diabetes History  Diabetes Diagnosis: 1-3 years    Nutrition  Meal Planning: 3 meals per day    Monitoring   Blood Glucose Logs: Yes  Do you use a personal glucose monitor?: No  In the last month, how often have you had a low blood sugar reaction?: more than once a week  Can you tell when your blood sugar is too high?: yes    Current Diabetes Treatment   Current Treatment: Insulin (fiasp)    Social History  Primary Support: Spouse  Occupation: realtor    Barriers to Change  Barriers to Change: None  Learning Challenges : None    Readiness to Learn   Readiness to Learn : Eager    Cultural Influences  Cultural Influences: No    Diabetes Education Assessment/Progress  Diabetes Disease Process (diabetes disease process and treatment options): Discussion, Written Materials Provided  Nutrition (Incorporating nutritional management into one's lifestyle): Discussion, Written Materials Provided  Physical Activity  (incorporating physical activity into one's lifestyle): Discussion  Medications (states correct name, dose, onset, peak, duration, side effects & timing of meds): Discussion  Monitoring (monitoring blood glucose/other parameters & using results): Discussion  Acute Complications (preventing, detecting, and treating acute complications): Discussion  Chronic Complications (preventing, detecting, and treating chronic complications): Discussion  Clinical (diabetes, other pertinent medical history, and relevant comorbidities reviewed during visit): Discussion  Cognitive (knowledge of self-management skills, functional health literacy): Discussion  Psychosocial (emotional response to diabetes): Discussion  Diabetes Distress and Support Systems: Discussion  Behavioral (readiness for change, lifestyle practices, self-care behaviors): Discussion    Goals  Patient has selected/evaluated goals during today's session: Yes, selected  Monitoring: Set (keep records of ac meal readings and hs,insulin dosed)  Reducing Risks: Set (avoid overtreating lows)  Start Date: 06/26/18  Target Date: 07/26/18    Diabetes Care Plan/Intervention  Education Plan/Intervention: Individual Follow-Up DSMT    Diabetes Meal Plan  Carbohydrate Per Meal: 45-60g    Education Units of Time   Time Spent: 60 min    Health Maintenance was reviewed today with patient. Discussed with patient importance of routine eye exams, foot exams/foot care, blood work (i.e.: A1c, microalbumin, and lipid), dental visits, yearly flu vaccine, and pneumonia vaccine as indicated by PCP. Patient verbalized understanding.     Health Maintenance Topics with due status: Not Due       Topic Last Completion Date    Pap Smear with HPV Cotest 05/22/2018    Mammogram 05/29/2018    Influenza Vaccine Not Due     Health Maintenance Due   Topic Date Due    Lipid Panel  1977    TETANUS VACCINE  04/02/1995

## 2018-06-26 NOTE — TELEPHONE ENCOUNTER
"Spoke to pt and again and again advised per Dr. Jordan. Pt verbalized understanding. Offered to schedule with Dr. Oliva, pt declined stating "right now I need to focus on some other health issues so I'll call back after Friday to schedule with Dr. Oliva."  "

## 2018-06-26 NOTE — Clinical Note
She will send in BG logs on Friday via [facility] for review.  She is doing her own thing with fiasp. Hard to understand pattern. Wants dexcom. Started paperwork.

## 2018-06-27 ENCOUNTER — PATIENT MESSAGE (OUTPATIENT)
Dept: ENDOSCOPY | Facility: HOSPITAL | Age: 43
End: 2018-06-27

## 2018-06-27 ENCOUNTER — PATIENT MESSAGE (OUTPATIENT)
Dept: INTERNAL MEDICINE | Facility: CLINIC | Age: 43
End: 2018-06-27

## 2018-06-28 ENCOUNTER — TELEPHONE (OUTPATIENT)
Dept: TRANSPLANT | Facility: HOSPITAL | Age: 43
End: 2018-06-28

## 2018-06-28 NOTE — TELEPHONE ENCOUNTER
"LESLY followed up with pt re: DME. Pt states would like a "mon[employer]" brand portable vest as opposed to a standard vest from in [employer] as pt already has one.  Pt also states has not received mouth pieces for nebulizer either. LESLY followed up with Shanta at [facility] who states facility does not carry parts.    LESLY contacted both [facility], [facility] and [facility] who also states does not supply. LESLY informed RN coordinator and will continue to attempt to contact CF clinic at [facility] for guidance [phone]  "

## 2018-06-29 ENCOUNTER — PATIENT MESSAGE (OUTPATIENT)
Dept: TRANSPLANT | Facility: CLINIC | Age: 43
End: 2018-06-29

## 2018-06-29 ENCOUNTER — TELEPHONE (OUTPATIENT)
Dept: TRANSPLANT | Facility: HOSPITAL | Age: 43
End: 2018-06-29

## 2018-06-29 NOTE — TELEPHONE ENCOUNTER
LESLY obtained information from [facility] web site stating [facility] can supply mouthpieces for nebulizer. LESLY contacted [facility] and spoke with Erma (CF line [phone] option #2). Erma states that nebulizer is specifically for [facility] and that every time pt refills med there is new tubing and handset supplied. LESLY followed up with RN coordinator to inquire about need for handset. LESLY also noted that Delisa is not on pt's med list. RN states will send correspondence to pt. LESLY remains available.

## 2018-07-01 ENCOUNTER — TELEPHONE (OUTPATIENT)
Dept: GASTROENTEROLOGY | Facility: CLINIC | Age: 43
End: 2018-07-01

## 2018-07-01 NOTE — TELEPHONE ENCOUNTER
Dr. Rio King's dietitian will not be able to adjust her enzymes.    The lab pancreatic elastase shows us that she has pancreatic insufficiency, which is not surprising.  The lab just tells us the extend of pancreatic damage from CF.  This confirms to me that she needs pancreatic enzymes.   The lab can still be abnormal even if the dose of pancreatic enzymes is correct.  This test is not useful as a measure to monitor the effectiveness of enzyme replacement  because it is a measure of intrinsic pancreatic function and not a measure of fat malabsorption.   We need to use her symptoms to find the perfect dose of enzymes.  Please inquire about symptoms of malabsorption, such as diarrhea (foul smelling, greasy, bulky stools), bloating, gassiness, and abdominal pain. If she has been having these symptoms consistently for weeks she can increase the current dose of enzymes by adding one pill to meals and one pill to snacks. We will discuss this further at her next visit.  We will consider measuring fat in her stools at next visit

## 2018-07-02 ENCOUNTER — OFFICE VISIT (OUTPATIENT)
Dept: INTERNAL MEDICINE | Facility: CLINIC | Age: 43
End: 2018-07-02
Payer: COMMERCIAL

## 2018-07-02 ENCOUNTER — HOSPITAL ENCOUNTER (OUTPATIENT)
Dept: RADIOLOGY | Facility: HOSPITAL | Age: 43
Discharge: HOME | End: 2018-07-02
Attending: INTERNAL MEDICINE
Payer: COMMERCIAL

## 2018-07-02 VITALS
SYSTOLIC BLOOD PRESSURE: 122 MMHG | HEART RATE: 104 BPM | TEMPERATURE: 100 F | BODY MASS INDEX: 21.94 KG/M2 | OXYGEN SATURATION: 98 % | HEIGHT: 61 IN | WEIGHT: 116.19 LBS | DIASTOLIC BLOOD PRESSURE: 60 MMHG | RESPIRATION RATE: 18 BRPM

## 2018-07-02 DIAGNOSIS — E84.8 PANCREATIC INSUFFICIENCY DUE TO CYSTIC FIBROSIS: ICD-10-CM

## 2018-07-02 DIAGNOSIS — F98.8 ATTENTION DEFICIT DISORDER, UNSPECIFIED HYPERACTIVITY PRESENCE: ICD-10-CM

## 2018-07-02 DIAGNOSIS — G47.00 INSOMNIA, UNSPECIFIED TYPE: ICD-10-CM

## 2018-07-02 DIAGNOSIS — E84.9 CYSTIC FIBROSIS: ICD-10-CM

## 2018-07-02 DIAGNOSIS — Z00.00 ANNUAL PHYSICAL EXAM: Primary | ICD-10-CM

## 2018-07-02 DIAGNOSIS — F41.9 ANXIETY: ICD-10-CM

## 2018-07-02 DIAGNOSIS — K86.89 PANCREATIC INSUFFICIENCY DUE TO CYSTIC FIBROSIS: ICD-10-CM

## 2018-07-02 DIAGNOSIS — E84.8 DIABETES MELLITUS RELATED TO CYSTIC FIBROSIS: ICD-10-CM

## 2018-07-02 DIAGNOSIS — K86.89 PANCREATIC INSUFFICIENCY: ICD-10-CM

## 2018-07-02 DIAGNOSIS — Z00.00 ANNUAL PHYSICAL EXAM: ICD-10-CM

## 2018-07-02 DIAGNOSIS — E08.9 DIABETES MELLITUS RELATED TO CYSTIC FIBROSIS: ICD-10-CM

## 2018-07-02 LAB
B-HCG UR QL: NEGATIVE
CTP QC/QA: YES

## 2018-07-02 PROCEDURE — 71046 X-RAY EXAM CHEST 2 VIEWS: CPT | Mod: 26,,, | Performed by: RADIOLOGY

## 2018-07-02 PROCEDURE — 99396 PREV VISIT EST AGE 40-64: CPT | Mod: S$GLB,,, | Performed by: INTERNAL MEDICINE

## 2018-07-02 PROCEDURE — 81025 URINE PREGNANCY TEST: CPT | Mod: S$GLB,,, | Performed by: INTERNAL MEDICINE

## 2018-07-02 PROCEDURE — 99999 PR PBB SHADOW E&M-EST. PATIENT-LVL III: CPT | Mod: PBBFAC,,, | Performed by: INTERNAL MEDICINE

## 2018-07-02 PROCEDURE — 71046 X-RAY EXAM CHEST 2 VIEWS: CPT | Mod: TC,PO

## 2018-07-02 RX ORDER — DEXTROAMPHETAMINE SACCHARATE, AMPHETAMINE ASPARTATE, DEXTROAMPHETAMINE SULFATE AND AMPHETAMINE SULFATE 2.5; 2.5; 2.5; 2.5 MG/1; MG/1; MG/1; MG/1
TABLET ORAL
COMMUNITY
Start: 2018-06-14 | End: 2018-07-02

## 2018-07-02 NOTE — TELEPHONE ENCOUNTER
Spoke to Olinda, explained below to her per Dr. Shultz  Currently pt reports normal formed stool, last BM this morning.     Pt reports she spoke to her friend who is a transplant provider who recommended she take 8-10 enzymes.  Olidna states ever since she went up to 8-10 she has had no pain, no bleeding, and normal BMs.  Olinda states she was taking 4-6 capsules of Zenpep per meal.      Since, symptoms have improved Olinda wants to know if Dr. Shultz thinks c-scope is still necessary.  Let Olinda know Dr. Shultz will be out of the office for the next two weeks, but will give message to her upon her return.      Olinda verbalizes understanding and appreciates call.

## 2018-07-02 NOTE — PROGRESS NOTES
Subjective:       Patient ID: Olinda Lombardi is a 43 y.o. female.    Chief Complaint: Night Sweats (x 3 weeks)    HPI   43 y.o. Female here for annual exam.     Cholesterol: (needs)  Vaccines: Influenza (declined); Tetanus (2014)  Eye exam: 2017  Mammogram: 5/18  Gyn exam: 5/18    Exercise: no  Diet: regular   LMP: 6/18/18    Past Medical History:  No date: Abnormal Pap smear of vagina  No date: Bilateral carpal tunnel syndrome  No date: Cystic fibrosis  No date: Cystic fibrosis  No date: Diabetes mellitus      Comment: CFRD  Past Surgical History:  No date: ADENOIDECTOMY  No date: SINUS SURGERY  Social History    Marital status:              Spouse name:                       Years of education:                 Number of children:               Occupational History    Relator     Social History Main Topics    Smoking status: Never Smoker                                                                Smokeless tobacco: Never Used                        Alcohol use: Yes           1.2 oz/week       Glasses of wine: 2 per week       Comment: 1-2 drinks a week     Drug use: No              Sexual activity: Yes               Partners with: Male       Birth control/protection: None       Comment:      Review of patient's allergies indicates:   -- Penicillin   Ms. Lombardi had no medications administered during this visit.    Review of Systems   Constitutional: Negative for activity change, appetite change, chills, diaphoresis, fatigue, fever and unexpected weight change.   HENT: Negative for congestion, hearing loss, mouth sores, postnasal drip, rhinorrhea, sinus pressure, sneezing, sore throat, trouble swallowing and voice change.    Eyes: Negative for pain, discharge and visual disturbance.   Respiratory: Positive for chest tightness and wheezing. Negative for cough and shortness of breath.    Cardiovascular: Positive for palpitations. Negative for chest pain and leg swelling.   Gastrointestinal:  Positive for constipation and diarrhea. Negative for abdominal pain, blood in stool, nausea and vomiting.   Endocrine: Negative for cold intolerance, heat intolerance, polydipsia and polyuria.   Genitourinary: Negative for difficulty urinating, dysuria, frequency, hematuria, menstrual problem and urgency.   Musculoskeletal: Negative for arthralgias, joint swelling, myalgias and neck pain.   Skin: Negative for rash and wound.   Allergic/Immunologic: Negative for environmental allergies and food allergies.   Neurological: Positive for weakness. Negative for dizziness, tremors, seizures, syncope, light-headedness and headaches.   Hematological: Negative for adenopathy. Does not bruise/bleed easily.   Psychiatric/Behavioral: Positive for dysphoric mood. Negative for confusion and sleep disturbance. The patient is not nervous/anxious.        Objective:      Physical Exam   Constitutional: She is oriented to person, place, and time. She appears well-developed and well-nourished. No distress.   HENT:   Head: Normocephalic and atraumatic.   Right Ear: External ear normal.   Left Ear: External ear normal.   Nose: Nose normal.   Mouth/Throat: Oropharynx is clear and moist. No oropharyngeal exudate.   Eyes: Conjunctivae and EOM are normal. Pupils are equal, round, and reactive to light. Right eye exhibits no discharge. Left eye exhibits no discharge. No scleral icterus.   Neck: Neck supple. No JVD present. No thyromegaly present.   Cardiovascular: Normal rate, regular rhythm, normal heart sounds and intact distal pulses.    No murmur heard.  Pulmonary/Chest: Effort normal and breath sounds normal. No respiratory distress. She has no wheezes. She has no rales. She exhibits no tenderness.   Abdominal: Soft. Bowel sounds are normal. She exhibits no distension. There is no tenderness. There is no guarding.   Musculoskeletal: She exhibits no edema.   Lymphadenopathy:     She has no cervical adenopathy.   Neurological: She is alert  and oriented to person, place, and time. No cranial nerve deficit. Coordination normal.   Skin: Skin is warm and dry. No rash noted. She is not diaphoretic. No pallor.   Psychiatric: She has a normal mood and affect. Judgment normal.   Nursing note and vitals reviewed.      Assessment:       1. Annual physical exam    2. Cystic fibrosis    3. Diabetes mellitus related to cystic fibrosis    4. Pancreatic insufficiency due to cystic fibrosis    5. Insomnia, unspecified type    6. Anxiety    7. Attention deficit disorder, unspecified hyperactivity presence        Plan:    1. Complete blood work ordered       Vaccines: Influenza (declined); Tetanus (2014)       Eye exam: 2017       Mammogram: 5/18       Gyn exam: 5/18   2. CF- stable, followed by Pulm    3. T2DM related to CF- stable, CPT       Followed by Endocrine   4. Pancreatic insufficiency- stable on pancreatic enzymes    5. Insomnia- stable on Trazodone qHS   6. Anxiety- stable on Zoloft/Xanax   7. ADD- stable on Adderall, followed by Psyc   8. F/u in 1 yr

## 2018-07-03 ENCOUNTER — PATIENT MESSAGE (OUTPATIENT)
Dept: INTERNAL MEDICINE | Facility: CLINIC | Age: 43
End: 2018-07-03

## 2018-07-04 ENCOUNTER — PATIENT MESSAGE (OUTPATIENT)
Dept: INTERNAL MEDICINE | Facility: CLINIC | Age: 43
End: 2018-07-04

## 2018-07-05 ENCOUNTER — TELEPHONE (OUTPATIENT)
Dept: GASTROENTEROLOGY | Facility: CLINIC | Age: 43
End: 2018-07-05

## 2018-07-05 NOTE — TELEPHONE ENCOUNTER
----- Message from Olinda Eng MA sent at 7/5/2018  2:08 PM CDT -----  Contact: Vy payne/Gaurav    Rx Refill/Request     Is this a Refill or New Rx:  refill  Rx Name and Strength:  Test scripts  Preferred Pharmacy with phone number:   above  Communication Preference:  Phone# above  Additional Information:   Need to know how many he uses so we can bill the insurance

## 2018-07-05 NOTE — TELEPHONE ENCOUNTER
Spoke to Gaurav.  They are calling re rx for test strips.   Message request forwarded to prescribing provider's office for clarification.

## 2018-07-06 ENCOUNTER — TELEPHONE (OUTPATIENT)
Dept: TRANSPLANT | Facility: HOSPITAL | Age: 43
End: 2018-07-06

## 2018-07-09 ENCOUNTER — TELEPHONE (OUTPATIENT)
Dept: TRANSPLANT | Facility: HOSPITAL | Age: 43
End: 2018-07-09

## 2018-07-09 NOTE — TELEPHONE ENCOUNTER
----- Message from Shanae Qurehsi sent at 7/9/2018 11:14 AM CDT -----  Contact: Jennifer  Needs Advice    Reason for call:      Communication Preference: [phone]  Additional Information: calling from Marin Cavazos regarding order sent by Jamilah Gill for the pt    LESLY rcvd above message re: pt's vest order. LESLY contacted Jennifer back. Jennifer states is sending over specific order form for [place] vest. LESLY provided fax for order. LESLY to have reviewed and singed by MD and will return.

## 2018-07-10 ENCOUNTER — TELEPHONE (OUTPATIENT)
Dept: PHARMACY | Facility: CLINIC | Age: 43
End: 2018-07-10

## 2018-07-18 ENCOUNTER — PATIENT MESSAGE (OUTPATIENT)
Dept: GASTROENTEROLOGY | Facility: CLINIC | Age: 43
End: 2018-07-18

## 2018-07-18 NOTE — TELEPHONE ENCOUNTER
Attempted to call pt upon my return from vacation w/o success    Send an email.  Olinda,     I do not feel comfortable prescribing such high doses of pancreatic enzymes as this exceeds what is recommended by [employer] (Max lipase dose: 2500 U/kg/meal, 10 000U/kg/day).  Long term use of high doses of pancreatic enzymes can lead to complications.  We traditionally increase the enzymes by 1 pills per meal.       You are anemic and you still need to get EGD and colonoscopy even if your symptoms have improved.  CF patients should get a screening colonoscopy at 40.   We do not want to miss a precancerous polyp or even cancer.  Please reschedule your procedures when you are ready by calling [phone].      Sincerely,   Dr. Shultz

## 2018-07-20 ENCOUNTER — TELEPHONE (OUTPATIENT)
Dept: TRANSPLANT | Facility: CLINIC | Age: 43
End: 2018-07-20

## 2018-07-20 NOTE — TELEPHONE ENCOUNTER
Marci called for clarification regarding the type of CPT vest needed for patient.  According to patient's EMR, a [place] vest has been requested.    Spoke with Brook Patrick LMSW, who stated the order form for the [place] vest was faxed to [facility] last week.  LESLY Patrick stated she will call [facility] at [phone] to determine if any additional information is needed.

## 2018-07-20 NOTE — TELEPHONE ENCOUNTER
----- Message from Michelle Caldwell sent at 7/20/2018 12:44 PM CDT -----  Contact: Marci   Needs Advice    Reason for call:    Called to verify if new orders were received.  Communication Preference: [phone]  Additional Information: n/a

## 2018-08-01 DIAGNOSIS — E84.9 CYSTIC FIBROSIS: Primary | ICD-10-CM

## 2018-08-06 ENCOUNTER — TELEPHONE (OUTPATIENT)
Dept: PHARMACY | Facility: CLINIC | Age: 43
End: 2018-08-06

## 2018-09-05 ENCOUNTER — TELEPHONE (OUTPATIENT)
Dept: PHARMACY | Facility: CLINIC | Age: 43
End: 2018-09-05

## 2018-09-05 ENCOUNTER — PATIENT MESSAGE (OUTPATIENT)
Dept: INTERNAL MEDICINE | Facility: CLINIC | Age: 43
End: 2018-09-05

## 2018-09-06 ENCOUNTER — OFFICE VISIT (OUTPATIENT)
Dept: INTERNAL MEDICINE | Facility: CLINIC | Age: 43
End: 2018-09-06
Payer: COMMERCIAL

## 2018-09-06 ENCOUNTER — LAB VISIT (OUTPATIENT)
Dept: LAB | Facility: HOSPITAL | Age: 43
End: 2018-09-06
Attending: INTERNAL MEDICINE
Payer: COMMERCIAL

## 2018-09-06 VITALS
HEIGHT: 61 IN | TEMPERATURE: 98 F | RESPIRATION RATE: 18 BRPM | DIASTOLIC BLOOD PRESSURE: 77 MMHG | WEIGHT: 118.38 LBS | HEART RATE: 84 BPM | BODY MASS INDEX: 22.35 KG/M2 | SYSTOLIC BLOOD PRESSURE: 122 MMHG

## 2018-09-06 DIAGNOSIS — N30.01 ACUTE CYSTITIS WITH HEMATURIA: ICD-10-CM

## 2018-09-06 DIAGNOSIS — E84.9 CYSTIC FIBROSIS: ICD-10-CM

## 2018-09-06 DIAGNOSIS — G47.00 INSOMNIA, UNSPECIFIED TYPE: ICD-10-CM

## 2018-09-06 DIAGNOSIS — E84.8 PANCREATIC INSUFFICIENCY DUE TO CYSTIC FIBROSIS: ICD-10-CM

## 2018-09-06 DIAGNOSIS — F41.9 ANXIETY: ICD-10-CM

## 2018-09-06 DIAGNOSIS — E84.8 DIABETES MELLITUS RELATED TO CYSTIC FIBROSIS: ICD-10-CM

## 2018-09-06 DIAGNOSIS — E08.9 DIABETES MELLITUS RELATED TO CYSTIC FIBROSIS: ICD-10-CM

## 2018-09-06 DIAGNOSIS — K86.89 PANCREATIC INSUFFICIENCY DUE TO CYSTIC FIBROSIS: ICD-10-CM

## 2018-09-06 DIAGNOSIS — N30.01 ACUTE CYSTITIS WITH HEMATURIA: Primary | ICD-10-CM

## 2018-09-06 DIAGNOSIS — F98.8 ATTENTION DEFICIT DISORDER, UNSPECIFIED HYPERACTIVITY PRESENCE: ICD-10-CM

## 2018-09-06 LAB
BACTERIA #/AREA URNS AUTO: ABNORMAL /HPF
BILIRUB UR QL STRIP: NEGATIVE
CAOX CRY UR QL COMP ASSIST: ABNORMAL
CLARITY UR REFRACT.AUTO: ABNORMAL
COLOR UR AUTO: ABNORMAL
GLUCOSE UR QL STRIP: NEGATIVE
HGB UR QL STRIP: ABNORMAL
HYALINE CASTS UR QL AUTO: 0 /LPF
KETONES UR QL STRIP: ABNORMAL
LEUKOCYTE ESTERASE UR QL STRIP: NEGATIVE
MICROSCOPIC COMMENT: ABNORMAL
NITRITE UR QL STRIP: NEGATIVE
PH UR STRIP: 6 [PH] (ref 5–8)
PROT UR QL STRIP: ABNORMAL
RBC #/AREA URNS AUTO: >100 /HPF (ref 0–4)
SP GR UR STRIP: 1.02 (ref 1–1.03)
SQUAMOUS #/AREA URNS AUTO: 11 /HPF
URN SPEC COLLECT METH UR: ABNORMAL
UROBILINOGEN UR STRIP-ACNC: NEGATIVE EU/DL
WBC #/AREA URNS AUTO: 64 /HPF (ref 0–5)
WBC CLUMPS UR QL AUTO: ABNORMAL

## 2018-09-06 PROCEDURE — 99999 PR PBB SHADOW E&M-EST. PATIENT-LVL III: CPT | Mod: PBBFAC,,, | Performed by: INTERNAL MEDICINE

## 2018-09-06 PROCEDURE — 99214 OFFICE O/P EST MOD 30 MIN: CPT | Mod: S$GLB,,, | Performed by: INTERNAL MEDICINE

## 2018-09-06 PROCEDURE — 3008F BODY MASS INDEX DOCD: CPT | Mod: CPTII,S$GLB,, | Performed by: INTERNAL MEDICINE

## 2018-09-06 PROCEDURE — 87086 URINE CULTURE/COLONY COUNT: CPT

## 2018-09-06 PROCEDURE — 81001 URINALYSIS AUTO W/SCOPE: CPT

## 2018-09-06 NOTE — PROGRESS NOTES
Subjective:       Patient ID: Olinda Lombardi is a 43 y.o. female.    Chief Complaint: Follow-up (UTI); Neck Pain; and Back Pain    HPI   Pt here for f/u from  for a UTI. She presented c/o of hematuria, increased frequency and urgency. No dysuria.  Pt was seen 2 days ago and was started on Ceftin which has improved her symptoms.   Review of Systems   Constitutional: Positive for activity change. Negative for unexpected weight change.   HENT: Negative for hearing loss, rhinorrhea and trouble swallowing.    Eyes: Negative for discharge and visual disturbance.   Respiratory: Negative for chest tightness and wheezing.    Cardiovascular: Negative for chest pain and palpitations.   Gastrointestinal: Negative for blood in stool, constipation, diarrhea and vomiting.   Endocrine: Positive for polyuria. Negative for polydipsia.   Genitourinary: Positive for hematuria. Negative for difficulty urinating, dysuria and menstrual problem.   Musculoskeletal: Positive for arthralgias, joint swelling and neck pain.   Neurological: Negative for weakness and headaches.   Psychiatric/Behavioral: Positive for dysphoric mood. Negative for confusion, self-injury and suicidal ideas.       Objective:      Physical Exam   Constitutional: She is oriented to person, place, and time. She appears well-developed and well-nourished. No distress.   HENT:   Head: Normocephalic and atraumatic.   Eyes: Conjunctivae and EOM are normal. Pupils are equal, round, and reactive to light. Right eye exhibits no discharge. Left eye exhibits no discharge. No scleral icterus.   Neck: Neck supple. No JVD present.   Cardiovascular: Normal rate, regular rhythm, normal heart sounds and intact distal pulses.   Pulmonary/Chest: Effort normal and breath sounds normal. No respiratory distress. She has no wheezes. She has no rales.   Abdominal: Soft. Bowel sounds are normal. There is no tenderness.   Musculoskeletal: She exhibits no edema.   Lymphadenopathy:     She  has no cervical adenopathy.   Neurological: She is alert and oriented to person, place, and time.   Skin: Skin is warm and dry. No rash noted. She is not diaphoretic. No pallor.       Assessment:       1. Acute cystitis with hematuria    2. Cystic fibrosis    3. Diabetes mellitus related to cystic fibrosis    4. Pancreatic insufficiency due to cystic fibrosis    5. Insomnia, unspecified type    6. Anxiety    7. Attention deficit disorder, unspecified hyperactivity presence        Plan:    1. Repeat UA/Urine Cx       Complete Ceftin as prescribed    2. CF- stable, followed by Pulm    3. T2DM related to CF- stable, CPT       Followed by Endocrine   4. Pancreatic insufficiency- stable on pancreatic enzymes    5. Insomnia- stable on Trazodone qHS   6. Anxiety- stable on Zoloft/Xanax   7. ADD- stable on Adderall, followed by Psyc

## 2018-09-07 ENCOUNTER — PATIENT MESSAGE (OUTPATIENT)
Dept: INTERNAL MEDICINE | Facility: CLINIC | Age: 43
End: 2018-09-07

## 2018-09-07 LAB — BACTERIA UR CULT: NO GROWTH

## 2018-09-07 NOTE — TELEPHONE ENCOUNTER
Blood and white blood blood cell seen. This can be seen with an infection. We are awaiting urine culture.

## 2018-09-10 ENCOUNTER — PATIENT MESSAGE (OUTPATIENT)
Dept: INTERNAL MEDICINE | Facility: CLINIC | Age: 43
End: 2018-09-10

## 2018-09-11 ENCOUNTER — HOSPITAL ENCOUNTER (OUTPATIENT)
Dept: RADIOLOGY | Facility: HOSPITAL | Age: 43
Discharge: HOME | End: 2018-09-11
Attending: INTERNAL MEDICINE
Payer: COMMERCIAL

## 2018-09-11 ENCOUNTER — HOSPITAL ENCOUNTER (OUTPATIENT)
Dept: PULMONOLOGY | Facility: CLINIC | Age: 43
Discharge: HOME | End: 2018-09-11
Payer: COMMERCIAL

## 2018-09-11 ENCOUNTER — OFFICE VISIT (OUTPATIENT)
Dept: TRANSPLANT | Facility: CLINIC | Age: 43
End: 2018-09-11
Payer: COMMERCIAL

## 2018-09-11 VITALS
TEMPERATURE: 97 F | DIASTOLIC BLOOD PRESSURE: 66 MMHG | HEART RATE: 99 BPM | HEIGHT: 61 IN | OXYGEN SATURATION: 98 % | SYSTOLIC BLOOD PRESSURE: 123 MMHG | BODY MASS INDEX: 22.09 KG/M2 | RESPIRATION RATE: 20 BRPM | WEIGHT: 117 LBS

## 2018-09-11 DIAGNOSIS — K86.89 PANCREATIC INSUFFICIENCY: ICD-10-CM

## 2018-09-11 DIAGNOSIS — E84.9 CYSTIC FIBROSIS: ICD-10-CM

## 2018-09-11 DIAGNOSIS — R31.9 HEMATURIA, UNSPECIFIED TYPE: Primary | ICD-10-CM

## 2018-09-11 LAB
PRE FEV1 FVC: 68
PRE FEV1: 2.02
PRE FVC: 2.95
PREDICTED FEV1 FVC: 86
PREDICTED FEV1: 2.56
PREDICTED FVC: 3.02

## 2018-09-11 PROCEDURE — 3008F BODY MASS INDEX DOCD: CPT | Mod: CPTII,S$GLB,, | Performed by: INTERNAL MEDICINE

## 2018-09-11 PROCEDURE — 71046 X-RAY EXAM CHEST 2 VIEWS: CPT | Mod: TC

## 2018-09-11 PROCEDURE — 94010 BREATHING CAPACITY TEST: CPT | Mod: S$GLB,,, | Performed by: INTERNAL MEDICINE

## 2018-09-11 PROCEDURE — 99213 OFFICE O/P EST LOW 20 MIN: CPT | Mod: 25,S$GLB,, | Performed by: INTERNAL MEDICINE

## 2018-09-11 PROCEDURE — 99999 PR PBB SHADOW E&M-EST. PATIENT-LVL III: CPT | Mod: PBBFAC,,, | Performed by: INTERNAL MEDICINE

## 2018-09-11 PROCEDURE — 71046 X-RAY EXAM CHEST 2 VIEWS: CPT | Mod: 26,,, | Performed by: RADIOLOGY

## 2018-09-11 RX ORDER — ACETAMINOPHEN 500 MG
2 TABLET ORAL DAILY
COMMUNITY
End: 2021-05-13

## 2018-09-11 RX ORDER — FERROUS SULFATE 325(65) MG
325 TABLET, DELAYED RELEASE (ENTERIC COATED) ORAL DAILY
COMMUNITY

## 2018-09-11 RX ORDER — DEXTROAMPHETAMINE SACCHARATE, AMPHETAMINE ASPARTATE, DEXTROAMPHETAMINE SULFATE AND AMPHETAMINE SULFATE 2.5; 2.5; 2.5; 2.5 MG/1; MG/1; MG/1; MG/1
5-10 TABLET ORAL DAILY
COMMUNITY
End: 2020-02-13

## 2018-09-11 NOTE — PROGRESS NOTES
"LUNG TRANSPLANT PRE FOLLOW-UP    Referring Physician:  Self-Referral    Reason for Visit:  Pre-lung transplant follow-up.         Date of Initial Evaluation:  03/13/2018                                                                                            History of Present Illness: Olinda Lombardi is a 43 y.o. female who is on 0L of oxygen. She is on no assisted ventilation.  Her New York Heart Association Class is I and a Karnofsky score of 90% - Able to carry on normal activity: minor symptoms of disease. She is diabetic insulin dependent. Patient presents today for routine follow up for cystic fibrosis. Patient reports doing well over the last 3 months from a respiratory standpoint. Patient reports dry cough with intermittent sputum production of yellow to green mucus that is her baseline. Patient denies SOB, chest pain, palpitations. Patient reports slightly improved compliance with inhaled therapies of HyperSal, Pulmozyme, and GAMA. Patient reports chest tightness following nebulizer treatments and states that she is doing the Xopenex immediately prior to starting the other inhaled therapies. Patient reports chest congestion but states that her sinus congestion is well controlled and she only has slight post nasal drainage currently.    Patient reports being started on Cefdinir x 7 days for UTI by urgent care/PCP as group B strep (25,000-50,000 colonies) on culture. Patient was prompted to present to urgent care after noticing gross hematuria for a few days. Patient currently denies hematuria, abdominal pain, nausea, and vomiting. Denies fevers, chills, dysuria, urinary urgency/frequency.    Patient reports self adjusting her Zenpep and was taking 8-10 capsules with meals. Patient noted improvement in stool formation by taking increased doses. Patient reports not undergoing EGD/colonoscopy yet.     /66   Pulse 99   Temp 97.4 °F (36.3 °C) (Oral)   Resp 20   Ht 5' 1" (1.549 m)   Wt 53.1 kg " (117 lb)   LMP 08/30/2018 (Approximate)   SpO2 98% Comment: room air  BMI 22.11 kg/m²   Review of Systems   Constitutional: Negative for chills, diaphoresis, fever, malaise/fatigue and weight loss.   HENT: Positive for congestion (chronic, well controlled currently). Negative for ear discharge, ear pain, hearing loss, nosebleeds, sinus pain, sore throat and tinnitus.    Eyes: Negative for blurred vision, double vision, photophobia, pain, discharge and redness.   Respiratory: Positive for cough and sputum production (yellow to green). Negative for hemoptysis, shortness of breath, wheezing and stridor.    Cardiovascular: Negative for chest pain, palpitations, orthopnea, claudication, leg swelling and PND.   Gastrointestinal: Positive for diarrhea. Negative for abdominal pain, blood in stool, constipation, heartburn, melena, nausea and vomiting.   Genitourinary: Positive for hematuria (resolved). Negative for dysuria, flank pain, frequency and urgency.   Musculoskeletal: Negative for back pain, falls, joint pain, myalgias and neck pain.   Skin: Negative for itching and rash.   Neurological: Negative for dizziness, tingling, tremors, sensory change, speech change, focal weakness, seizures, loss of consciousness, weakness and headaches.   Endo/Heme/Allergies: Negative for environmental allergies and polydipsia. Does not bruise/bleed easily.   Psychiatric/Behavioral: Negative for depression, hallucinations, memory loss, substance abuse and suicidal ideas. The patient has insomnia (chronic, well controlled with trazodone). The patient is not nervous/anxious.      Objective:   Physical Exam   Constitutional: She is oriented to person, place, and time and well-developed, well-nourished, and in no distress. No distress.   HENT:   Head: Normocephalic and atraumatic.   Nose: Nose normal.   Mouth/Throat: Oropharynx is clear and moist. No oropharyngeal exudate.   Eyes: Conjunctivae and EOM are normal. Pupils are equal, round,  and reactive to light. Right eye exhibits no discharge. Left eye exhibits no discharge. No scleral icterus.   Neck: Normal range of motion. Neck supple. No JVD present. No tracheal deviation present. No thyromegaly present.   Cardiovascular: Normal rate, regular rhythm, normal heart sounds and intact distal pulses. Exam reveals no gallop and no friction rub.   No murmur heard.  Pulmonary/Chest: Effort normal and breath sounds normal. No stridor. No respiratory distress. She has no wheezes. She has no rales. She exhibits no tenderness.   Abdominal: Bowel sounds are normal. She exhibits no distension. There is no tenderness. There is no guarding and no CVA tenderness.   Musculoskeletal: Normal range of motion. She exhibits no edema, tenderness or deformity.   Lymphadenopathy:     She has no cervical adenopathy.   Neurological: She is oriented to person, place, and time. No cranial nerve deficit. Gait normal. Coordination normal. GCS score is 15.   Skin: Skin is dry. No rash noted. She is not diaphoretic. No erythema. No pallor.   Psychiatric: Mood and affect normal.   Nursing note and vitals reviewed.    Labs:  Lab Visit on 09/11/2018   Component Date Value    Vit D, 25-Hydroxy 09/11/2018 30    Lab Visit on 09/06/2018   Component Date Value    Specimen UA 09/06/2018 Urine, Clean Catch     Color, UA 09/06/2018 Park     Appearance, UA 09/06/2018 Cloudy*    pH, UA 09/06/2018 6.0     Specific Gravity, UA 09/06/2018 1.020     Protein, UA 09/06/2018 1+*    Glucose, UA 09/06/2018 Negative     Ketones, UA 09/06/2018 Trace*    Bilirubin (UA) 09/06/2018 Negative     Occult Blood UA 09/06/2018 3+*    Nitrite, UA 09/06/2018 Negative     Urobilinogen, UA 09/06/2018 Negative     Leukocytes, UA 09/06/2018 Negative     Urine Culture, Routine 09/06/2018 No growth     RBC, UA 09/06/2018 >100*    WBC, UA 09/06/2018 64*    WBC Clumps, UA 09/06/2018 Rare     Bacteria, UA 09/06/2018 Occasional     Squam Epithel, UA  09/06/2018 11     Hyaline Casts, UA 09/06/2018 0     Ca Oxalate Maricruz, UA 09/06/2018 Many*    Microscopic Comment 09/06/2018 SEE COMMENT        Pulmonary Function Tests 9/11/2018 6/12/2018 3/13/2018 8/31/2016 8/5/2015 10/29/2014   FVC 2.95 3.09 3.07 3.15 3.29 3.26   FEV1 2.02 2.04 2.01 2.09 2.14 2.16   FVC% 98 103 98 94 98 97   FEV1% 79 80 77 76 77 77   FEF 25-75 1.16 1.11 1.08 1.13 98 1.12   FEF 25-75% 39 38 36 38 32 36     Other: No flowsheet data found.       Assessment:-  1. Hematuria, unspecified type    2. Cystic fibrosis    3. Pancreatic insufficiency      Plan:  1. Most recent UA on 9/6 concerning for possible nephritis vs nephrolithiasis as RBC>100 and many calcium oxalate casts noted on microscopic UA. Low concern for infectious etiology as nitrite and leukocyte negative. Urine culture from 9/6 with NGTD.  Will plan for Renal Stone CT scan tomorrow per patient's request. Will consider nephrology referral pending results of CT. Complete Cefdinir as prescribed per PCP/urgent care.    2. Continue with improved compliance of inhaled therapies, including Pulmozyme, HyperSal, and GAMA. Advised patient to inhale Xopenex 5-10 minutes prior to starting nebulizer treatments to avoid bronchospasm and symptoms of chest tightness. CXR reviewed today and is stable with no acute process noted. Spirometry reviewed and overall she is stable with slight declines noted in FEV1 and FVC. Respiratory culture specimen sent to lab, will follow up on finalized results.     3. Will increase patient to Zenpep 40,000 - 126,000 - 168,000 with taking 4 capsules with meals to alleviate GI symptoms of loose, fatty stools. Advised patient to continue with GI follow up and undergo screening colonoscopy as recommended by current CF guidelines.     4.  Follow up in 3 months or earlier if needed.     Mónica Larson PA-C  Lung Transplant

## 2018-09-12 ENCOUNTER — PATIENT MESSAGE (OUTPATIENT)
Dept: TRANSPLANT | Facility: CLINIC | Age: 43
End: 2018-09-12

## 2018-09-12 ENCOUNTER — HOSPITAL ENCOUNTER (OUTPATIENT)
Dept: RADIOLOGY | Facility: HOSPITAL | Age: 43
Discharge: HOME | End: 2018-09-12
Attending: INTERNAL MEDICINE
Payer: COMMERCIAL

## 2018-09-12 DIAGNOSIS — R31.9 HEMATURIA, UNSPECIFIED TYPE: ICD-10-CM

## 2018-09-12 PROCEDURE — 74176 CT ABD & PELVIS W/O CONTRAST: CPT | Mod: TC

## 2018-09-12 PROCEDURE — 74176 CT ABD & PELVIS W/O CONTRAST: CPT | Mod: 26,,, | Performed by: RADIOLOGY

## 2018-09-13 ENCOUNTER — PATIENT MESSAGE (OUTPATIENT)
Dept: TRANSPLANT | Facility: CLINIC | Age: 43
End: 2018-09-13

## 2018-09-13 DIAGNOSIS — N20.0 RENAL STONE: Primary | ICD-10-CM

## 2018-09-18 ENCOUNTER — OFFICE VISIT (OUTPATIENT)
Dept: UROLOGY | Facility: CLINIC | Age: 43
End: 2018-09-18
Payer: COMMERCIAL

## 2018-09-18 ENCOUNTER — LAB VISIT (OUTPATIENT)
Dept: LAB | Facility: HOSPITAL | Age: 43
End: 2018-09-18
Attending: UROLOGY
Payer: COMMERCIAL

## 2018-09-18 ENCOUNTER — PATIENT MESSAGE (OUTPATIENT)
Dept: TRANSPLANT | Facility: CLINIC | Age: 43
End: 2018-09-18

## 2018-09-18 VITALS — WEIGHT: 116 LBS | HEIGHT: 61 IN | BODY MASS INDEX: 21.9 KG/M2

## 2018-09-18 DIAGNOSIS — R31.0 GROSS HEMATURIA: ICD-10-CM

## 2018-09-18 DIAGNOSIS — N20.0 KIDNEY STONES: Primary | ICD-10-CM

## 2018-09-18 DIAGNOSIS — R31.0 HEMATURIA, GROSS: ICD-10-CM

## 2018-09-18 LAB
ANION GAP SERPL CALC-SCNC: 10 MMOL/L
BUN SERPL-MCNC: 11 MG/DL
CALCIUM SERPL-MCNC: 9.8 MG/DL
CHLORIDE SERPL-SCNC: 103 MMOL/L
CO2 SERPL-SCNC: 25 MMOL/L
CREAT SERPL-MCNC: 0.7 MG/DL
EST. GFR  (AFRICAN AMERICAN): >60 ML/MIN/1.73 M^2
EST. GFR  (NON AFRICAN AMERICAN): >60 ML/MIN/1.73 M^2
GLUCOSE SERPL-MCNC: 130 MG/DL
POTASSIUM SERPL-SCNC: 4.2 MMOL/L
SODIUM SERPL-SCNC: 138 MMOL/L

## 2018-09-18 PROCEDURE — 88112 CYTOPATH CELL ENHANCE TECH: CPT | Performed by: PATHOLOGY

## 2018-09-18 PROCEDURE — 99999 PR PBB SHADOW E&M-EST. PATIENT-LVL III: CPT | Mod: PBBFAC,,, | Performed by: UROLOGY

## 2018-09-18 PROCEDURE — 3008F BODY MASS INDEX DOCD: CPT | Mod: CPTII,S$GLB,, | Performed by: UROLOGY

## 2018-09-18 PROCEDURE — 80048 BASIC METABOLIC PNL TOTAL CA: CPT

## 2018-09-18 PROCEDURE — 99204 OFFICE O/P NEW MOD 45 MIN: CPT | Mod: S$GLB,,, | Performed by: UROLOGY

## 2018-09-18 NOTE — PROGRESS NOTES
Subjective:      Patient ID: Olinda Lombardi is a 43 y.o. female.    Chief Complaint: Nephrolithiasis and Results    Patient is a 43 y.o. female who is new to our clinic and referred by their PCP, Dr. Martínez for evaluation of kidney stones and hematuria.     HPI   The patient reports an episode of gross hematuria.  She was evaluated at an urgent care facility and the results are not available but she describes a culture that grew strep B, which I explained is typically a contaminated specimen and not a true urinary tract infection.  However, they did give her cefdinir oral antibiotics.  She denies any fevers or chills.  No dysuria.  No abdominal pain.  She subsequently underwent a urine culture with her primary care physician which was negative.  She has not had any recurrence of her gross hematuria.  She is a nonsmoker.  She works in [employer] and denies any chemical exposures.  She does have a family history of a none known renal mass for which her brother underwent a nephrectomy.    The patient has cystic fibrosis.  She underwent a CT scan which showed a 5mm left lower pole non-obstructing renal stone,  (ax) and 553 (coronal).        Review of Systems   All other systems reviewed and negative except pertinent positives noted in HPI.    Objective:     Physical Exam   Constitutional: She is oriented to person, place, and time. She appears well-developed and well-nourished. She is cooperative.  Non-toxic appearance.   HENT:   Head: Normocephalic.   Cardiovascular: Normal rate, intact distal pulses and normal pulses.    Pulmonary/Chest: Effort normal. No accessory muscle usage. No tachypnea. No respiratory distress.   Abdominal: Soft. Normal appearance. She exhibits no distension, no fluid wave, no ascites and no mass. There is no tenderness. There is no rebound and no CVA tenderness. No hernia.   Liver/spleen non-palpable   Musculoskeletal: Normal range of motion.   Neurological: She is alert  and oriented to person, place, and time. She has normal strength.   Skin: No bruising and no rash noted.          Psychiatric: She has a normal mood and affect. Her speech is normal and behavior is normal. Thought content normal.     Assessment:     1. Kidney stones    2. Hematuria, gross      Plan:     1. Kidney stones:  -General risk factors for kidney stones and the conservative measures to prevent kidney stones in the future were discussed with the patient in detail.  The patient was encouraged to drink 2-3 liters of water a day, limit iced tea and jason as well as foods high in oxalate.  They were cautioned to try to limit salt and red meat intake.  We also discussed adding citrate to the diet with the addition of erinn or lemon juice to their water or alternatively with crystal light.   -CT scan was independently reviewed today and reveals a 5mm left lower pole non-obstructing renal stone,  (ax) and 553 (coronal).  -recommend monitoring renal stone.    Can obtain imaging in 6-12 months with a renal US in the absence of renal colic symptoms.    2. Hematuria, gross:  The patient will be set up for a hematuria workup to include a CT urogram, urine cytology, cystoscopy. She has already had 2 urine cultures.  A BMP will be ordered if not done in the last 60 days.  The risks and benefits of cystoscopy were discussed with the patient.   -the patient would like to hold on the cystoscopy at this time until the other testing is done.

## 2018-09-19 ENCOUNTER — TELEPHONE (OUTPATIENT)
Dept: PHARMACY | Facility: CLINIC | Age: 43
End: 2018-09-19

## 2018-09-19 ENCOUNTER — HOSPITAL ENCOUNTER (OUTPATIENT)
Dept: RADIOLOGY | Facility: HOSPITAL | Age: 43
Discharge: HOME | End: 2018-09-19
Attending: UROLOGY
Payer: COMMERCIAL

## 2018-09-19 DIAGNOSIS — R31.0 GROSS HEMATURIA: ICD-10-CM

## 2018-09-21 ENCOUNTER — PATIENT MESSAGE (OUTPATIENT)
Dept: UROLOGY | Facility: CLINIC | Age: 43
End: 2018-09-21

## 2018-09-25 ENCOUNTER — TELEPHONE (OUTPATIENT)
Dept: UROLOGY | Facility: CLINIC | Age: 43
End: 2018-09-25

## 2018-09-25 ENCOUNTER — PATIENT MESSAGE (OUTPATIENT)
Dept: TRANSPLANT | Facility: CLINIC | Age: 43
End: 2018-09-25

## 2018-09-25 ENCOUNTER — PATIENT MESSAGE (OUTPATIENT)
Dept: ALLERGY | Facility: CLINIC | Age: 43
End: 2018-09-25

## 2018-09-25 DIAGNOSIS — R31.21 ASYMPTOMATIC MICROSCOPIC HEMATURIA: Primary | ICD-10-CM

## 2018-10-02 ENCOUNTER — TELEPHONE (OUTPATIENT)
Dept: PHARMACY | Facility: CLINIC | Age: 43
End: 2018-10-02

## 2018-10-02 ENCOUNTER — HOSPITAL ENCOUNTER (OUTPATIENT)
Dept: RADIOLOGY | Facility: HOSPITAL | Age: 43
Discharge: HOME | End: 2018-10-02
Attending: UROLOGY
Payer: COMMERCIAL

## 2018-10-02 PROCEDURE — 74178 CT ABD&PLV WO CNTR FLWD CNTR: CPT | Mod: TC

## 2018-10-02 PROCEDURE — 74178 CT ABD&PLV WO CNTR FLWD CNTR: CPT | Mod: 26,,, | Performed by: RADIOLOGY

## 2018-10-02 PROCEDURE — 25500020 PHARM REV CODE 255: Performed by: UROLOGY

## 2018-10-02 RX ADMIN — IOHEXOL 125 ML: 350 INJECTION, SOLUTION INTRAVENOUS at 07:10

## 2018-10-03 ENCOUNTER — PATIENT MESSAGE (OUTPATIENT)
Dept: UROLOGY | Facility: CLINIC | Age: 43
End: 2018-10-03

## 2018-10-23 ENCOUNTER — PATIENT MESSAGE (OUTPATIENT)
Dept: ALLERGY | Facility: CLINIC | Age: 43
End: 2018-10-23

## 2018-10-26 ENCOUNTER — TELEPHONE (OUTPATIENT)
Dept: UROLOGY | Facility: CLINIC | Age: 43
End: 2018-10-26

## 2018-10-29 ENCOUNTER — PATIENT MESSAGE (OUTPATIENT)
Dept: OBSTETRICS AND GYNECOLOGY | Facility: CLINIC | Age: 43
End: 2018-10-29

## 2018-10-29 ENCOUNTER — PATIENT MESSAGE (OUTPATIENT)
Dept: UROLOGY | Facility: CLINIC | Age: 43
End: 2018-10-29

## 2018-10-29 ENCOUNTER — PATIENT MESSAGE (OUTPATIENT)
Dept: GASTROENTEROLOGY | Facility: CLINIC | Age: 43
End: 2018-10-29

## 2018-10-30 ENCOUNTER — TELEPHONE (OUTPATIENT)
Dept: PHARMACY | Facility: CLINIC | Age: 43
End: 2018-10-30

## 2018-10-30 ENCOUNTER — HOSPITAL ENCOUNTER (OUTPATIENT)
Dept: UROLOGY | Facility: HOSPITAL | Age: 43
Discharge: HOME | End: 2018-10-30
Attending: UROLOGY
Payer: COMMERCIAL

## 2018-10-30 VITALS
HEART RATE: 76 BPM | WEIGHT: 121.5 LBS | HEIGHT: 61 IN | SYSTOLIC BLOOD PRESSURE: 125 MMHG | BODY MASS INDEX: 22.94 KG/M2 | RESPIRATION RATE: 18 BRPM | DIASTOLIC BLOOD PRESSURE: 73 MMHG

## 2018-10-30 DIAGNOSIS — R31.21 ASYMPTOMATIC MICROSCOPIC HEMATURIA: ICD-10-CM

## 2018-10-30 PROCEDURE — 52000 CYSTOURETHROSCOPY: CPT

## 2018-10-30 PROCEDURE — 52000 CYSTOURETHROSCOPY: CPT | Mod: ,,, | Performed by: UROLOGY

## 2018-10-30 RX ORDER — LIDOCAINE HYDROCHLORIDE 20 MG/ML
JELLY TOPICAL
Status: COMPLETED | OUTPATIENT
Start: 2018-10-30 | End: 2018-10-30

## 2018-10-30 RX ADMIN — LIDOCAINE HYDROCHLORIDE 10 ML: 20 JELLY TOPICAL at 10:10

## 2018-10-30 NOTE — PATIENT INSTRUCTIONS
What to Expect After a Cystoscopy  For the next 24-48 hours, you may feel a mild burning when you urinate. This burning is normal and expected. Drink plenty of water to dilute the urine to help relieve the burning sensation. You may also see a small amount of blood in your urine after the procedure.    Unless you are already taking antibiotics, you may be given an antibiotic after the test to prevent infection.    Signs and Symptoms to Report  Call the [facility] at [phone] if you develop any of the following:  · Fever of 101 degrees or higher  · Chills or persistent bleeding  · Inability to urinate

## 2018-10-30 NOTE — PROCEDURES
Procedure: Flexible cysto-uretheroscopy    Pre Procedure Diagnosis:microscopic hematuria    Post Procedure Diagnosis:Normal cystoscopy    Surgeon: Rolf Peters MD    Anesthesia: 2% uro-jet lidocaine jelly for local analgesia    Flexible cysto-urethroscopy was performed after consent was obtained.  The risks and benefits were explained.    2% lidocaine urojet was used for local analgesia.  The genitalia was prepped and draped in the sterile fashion with betadine.    The flexible scope was advanced into the urethra and into the bladder.  Bilateral ureteral orifice were evaluated and noted to be normal with clear efflux.  The bladder was completely surveyed in a systematic fashion.   No bladder tumors or lesions were seen.  No strictures were noted.    The patient tolerated the procedure well without complication.    They will follow up in 1 year(s) for a urinalysis.

## 2018-10-30 NOTE — H&P
[facility]  History & Physical     Subjective:     Chief Complaint/Reason for Admission: hematuria    History of Present Illness:   Patient 43 y.o. female presents with hematuria.  Here for cystoscopy.    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 04/12/2016          (Not in a hospital admission)  Review of patient's allergies indicates:   Allergen Reactions    Milk containing products Diarrhea    Penicillin     [place]         Past Medical History:   Diagnosis Date    Abnormal Pap smear of vagina     Bilateral carpal tunnel syndrome     Cystic fibrosis     Cystic fibrosis     Diabetes mellitus     CFRD      Past Surgical History:   Procedure Laterality Date    ADENOIDECTOMY      SINUS SURGERY        Family History   Problem Relation Age of Onset    Heart disease Father     Diabetes Mother     Breast cancer Maternal Grandmother     Colon cancer Neg Hx     Ovarian cancer Neg Hx     Celiac disease Neg Hx     Crohn's disease Neg Hx     Esophageal cancer Neg Hx     Inflammatory bowel disease Neg Hx     Irritable bowel syndrome Neg Hx     Liver cancer Neg Hx     Rectal cancer Neg Hx     Stomach cancer Neg Hx     Ulcerative colitis Neg Hx       Social History     Tobacco Use    Smoking status: Never Smoker    Smokeless tobacco: Never Used   Substance Use Topics    Alcohol use: Yes     Alcohol/week: 1.2 oz     Types: 2 Glasses of wine per week     Comment: 1-2 drinks a week         Review of Systems  All other systems reviewed and negative except pertinent positives noted in HPI.      Physical Exam   Constitutional: She is oriented to person, place, and time. She appears well-developed and  "well-nourished.   HENT:   Head: Normocephalic.   Eyes: EOM are normal.   Cardiovascular: Normal rate, intact distal pulses and normal pulses.   Pulmonary/Chest: No accessory muscle usage. No tachypnea. No respiratory distress.   Abdominal: Soft. Normal appearance. She exhibits no distension, no fluid wave, no ascites and no mass. There is no tenderness. There is no rebound, no guarding and no CVA tenderness. No hernia.   Musculoskeletal: Normal range of motion.   Neurological: She is alert and oriented to person, place, and time.   Skin: No bruising and no rash noted.   Psychiatric: She has a normal mood and affect. Her speech is normal and behavior is normal. Thought content normal.         OBJECTIVE:     Patient Vitals for the past 8 hrs:   BP Pulse Resp Height Weight   10/30/18 1005 130/79 89 18 5' 1" (1.549 m) 55.1 kg (121 lb 7.6 oz)         Data Review:  Microbiology Results (last 7 days)     ** No results found for the last 168 hours. **        Specimen (12h ago, onward)    None        No results for input(s): COLORU, CLARITYU, SPECGRAV, PHUR, PROTEINUA, GLUCOSEU, BILIRUBINCON, BLOODU, WBCU, RBCU, BACTERIA, MUCUS, NITRITE, LEUKOCYTESUR, UROBILINOGEN, HYALINECASTS in the last 168 hours.    ASSESSMENT/PLAN:     There are no hospital problems to display for this patient.    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     Plan:  I have explained the indication, risks, benefits, and alternatives of the procedure in detail.  The patient voices understanding and all questions have been answered.  The patient agrees to proceed as planned with cystoscopy.    "

## 2018-11-02 ENCOUNTER — TELEPHONE (OUTPATIENT)
Dept: PHARMACY | Facility: CLINIC | Age: 43
End: 2018-11-02

## 2018-11-15 ENCOUNTER — TELEPHONE (OUTPATIENT)
Dept: TRANSPLANT | Facility: CLINIC | Age: 43
End: 2018-11-15

## 2018-11-15 ENCOUNTER — PATIENT MESSAGE (OUTPATIENT)
Dept: TRANSPLANT | Facility: CLINIC | Age: 43
End: 2018-11-15

## 2018-11-15 NOTE — TELEPHONE ENCOUNTER
[facility] has been attempting to reach Ms Lombardi regarding prescription refill for Pulmozyme.     After numerous unsuccessful attempts and a portal message, we are sending a postcard to the address on file. At this point in time, no further contact will be made from [facility] until patient responds to our mail correspondence.    Pablito Blankenship, PharmD  Clinical Pharmacist   [facility]   P: [phone]

## 2018-11-15 NOTE — TELEPHONE ENCOUNTER
Portal message sent to patient to contact the [facility] regarding her Pulmozyme refill.    ----- Message from Pablito Blankenship PharmD sent at 11/15/2018  2:39 PM CST -----  Regarding: Pulmozyme Prescription  Good afternoon,    [facility] has been attempting to reach Ms Lombardi regarding prescription refill for Pulmozyme.     After numerous unsuccessful attempts and a portal message, we are sending a postcard to the address on file. At this point in time, no further contact will be made from [facility] until patient responds to our mail correspondence.    If there is anything else we can do to further assist, please let us know.     Thanks,    Pablito Blankenship PharmD  Clinical Pharmacist   [facility]   P: [phone]

## 2018-11-26 ENCOUNTER — OFFICE VISIT (OUTPATIENT)
Dept: GASTROENTEROLOGY | Facility: CLINIC | Age: 43
End: 2018-11-26
Payer: COMMERCIAL

## 2018-11-26 VITALS — BODY MASS INDEX: 23.08 KG/M2 | WEIGHT: 122.13 LBS

## 2018-11-26 DIAGNOSIS — Z12.11 SCREENING FOR MALIGNANT NEOPLASM OF COLON: Primary | ICD-10-CM

## 2018-11-26 DIAGNOSIS — R10.9 ABDOMINAL PAIN, UNSPECIFIED ABDOMINAL LOCATION: ICD-10-CM

## 2018-11-26 DIAGNOSIS — D64.9 ANEMIA, UNSPECIFIED TYPE: ICD-10-CM

## 2018-11-26 PROCEDURE — 99214 OFFICE O/P EST MOD 30 MIN: CPT | Mod: S$GLB,,, | Performed by: INTERNAL MEDICINE

## 2018-11-26 PROCEDURE — 3008F BODY MASS INDEX DOCD: CPT | Mod: CPTII,S$GLB,, | Performed by: INTERNAL MEDICINE

## 2018-11-26 PROCEDURE — 99999 PR PBB SHADOW E&M-EST. PATIENT-LVL III: CPT | Mod: PBBFAC,,, | Performed by: INTERNAL MEDICINE

## 2018-11-26 NOTE — PATIENT INSTRUCTIONS
SUPREP Instructions    You are scheduled for a colonoscopy with Dr. Onofre  on 12/18/18 at [facility]  To ensure that your test is accurate and complete, you MUST follow these instructions listed below.  If you have any questions, please call our office at [phone].  Plan on being at the hospital for your procedure for 3-4 hours.    1.  Follow a CLEAR LIQUID DIET for the entire day before your scheduled colonoscopy.  This means no solid food the entire day starting when you wake.  You may have as much of the clear liquids as you want throughout the day.   CLEAR LIQUID DIET:   - Avoid Red, Orange, Purple, and/or Blue food coloring   - NO DAIRY   - You can have:  Coffee with sugar (no creamer), tea, water, soda, apple or white grape juice, chicken or beef broth/bouillon (no meat, noodles, or veggies), green/yellow popsicles, green/yellow Jell-O, lemonade.    2.  AT 5 pm the evening before your colonoscopy, POUR ONE (1) BOTTLE OF SUPREP INTO THE MIXING CONTAINER, PROVIDED INSIDE THE BOX.  ADD WATER TO THE LINE ON THE CONTAINER AND MIX IT WELL.  DRINK THE ENTIRE CONTAINER AND THEN DRINK TWO (2) MORE CONTAINERS OF WATER OVER THE NEXT 1 HOUR.  This is sometimes easier to drink if this solution is cold, so you can mix the solution a few hours ahead of time and place in the refrigerator prior to drinking.  You have to drink the solution within 24 hours of mixing it.  Do NOT put this solution over ice.  It IS ok to drink with a straw.    3.  The endoscopy department will call you 2 days before your colonoscopy to tell you the exact time to arrive, AND to tell you the exact time to drink the 2nd portion of your prep (which will be FIVE HOURS BEFORE YOUR ARRIVAL TIME).  At this time given to you, POUR ONE (1) BOTTLE OF SUPREP INTO THE MIXING CONTAINER, PROVIDED INSIDE THE BOX.  ADD WATER TO THE LINE ON THE CONTAINER AND MIX IT WELL.  DRINK THE ENTIRE CONTAINER AND THEN DRINK TWO (2) MORE CONTAINERS OF WATER OVER THE  NEXT 1 HOUR.  This is sometimes easier to drink if this solution is cold, so you can mix the solution a few hours ahead of time and place in the refrigerator prior to drinking.  You have to drink the solution within 24 hours of mixing it.  Do NOT put this solution over ice.  It IS ok to drink with a straw. Once this is complete, you may not have ANYTHING else by mouth!    4.  You must have someone with you to DRIVE YOU HOME since you will be receiving IV sedation for the colonoscopy.    5.  It is ok to take your heart, blood pressure, and seizure medications in the morning of your test with a SIP of water.  Hold other medications until after your procedure.  Do NOT have anything else to eat or drink the morning of your colonoscopy.  It is ok to brush your teeth.    6.  If you are on blood thinners THAT YOU HAVE BEEN INSTRUCTED TO HOLD BY YOUR DOCTOR FOR THIS PROCEDURE, then do NOT take this the morning of your colonoscopy.  Do NOT stop these medications on your own, they must be approved to be held by your doctor.  Your colonoscopy can NOT be done if you are on these medications.  Examples of blood thinners include: Coumadin, Aggrenox, Plavix, Pradaxa, Reapro, Pletal, Xarelto, Ticagrelor, Brilinta, Eliquis, and high dose aspirin (325 mg).  You do not have to stop baby aspirin 81 mg.    7.  IF YOU ARE DIABETIC:  NO INSULIN OR ORAL MEDICATIONS THE MORNING OF THE COLONOSCOPY.  TAKE ONLY HALF THE DOSE OF YOUR INSULIN THE DAY BEFORE THE COLONOSCOPY.  DO NOT TAKE ANY ORAL DIABETIC MEDICATIONS THE DAY BEFORE THE COLONOSCOPY.  IF YOU ARE AN INSULIN DEPENDENT DIABETIC WITH UNSTABLE BLOOD SUGARDS, NOTIFY YOUR PRIMARY CARE PHYSICIAN FOR INSTRUCTIONS.

## 2018-12-01 RX ORDER — SODIUM, POTASSIUM,MAG SULFATES 17.5-3.13G
1 SOLUTION, RECONSTITUTED, ORAL ORAL DAILY
Qty: 1 KIT | Refills: 0 | Status: SHIPPED | OUTPATIENT
Start: 2018-12-01 | End: 2018-12-03

## 2018-12-01 NOTE — PROGRESS NOTES
Subjective:       Patient ID: Olinda Lombardi is a 43 y.o. female.    Chief Complaint: Abdominal pain    Abdominal Pain   This is a chronic problem. The current episode started more than 1 month ago. The onset quality is undetermined. The problem occurs intermittently. The problem has been unchanged. The pain is located in the generalized abdominal region. The pain is at a severity of 7/10. The pain is moderate. The quality of the pain is aching and sharp. The abdominal pain does not radiate. Associated symptoms include diarrhea. Pertinent negatives include no constipation. Nothing aggravates the pain. The pain is relieved by nothing. The treatment provided mild relief. Prior diagnostic workup includes GI consult. There is no history of irritable bowel syndrome. Patient's medical history does not include kidney stones.   Rectal Bleeding   Associated symptoms include abdominal pain. Pertinent negatives include no joint swelling.     Review of Systems   Constitutional: Negative for activity change, appetite change and unexpected weight change.   Respiratory: Negative for apnea and chest tightness.    Gastrointestinal: Positive for abdominal pain and diarrhea. Negative for constipation.   Musculoskeletal: Negative for gait problem and joint swelling.       Objective:      Physical Exam   Constitutional: She is oriented to person, place, and time. She appears well-developed and well-nourished. No distress.   HENT:   Head: Normocephalic and atraumatic.   Eyes: Conjunctivae are normal. No scleral icterus.   Neck: Normal range of motion. Neck supple. No tracheal deviation present. No thyromegaly present.   Cardiovascular: Normal rate and regular rhythm. Exam reveals no gallop and no friction rub.   No murmur heard.  Pulmonary/Chest: Effort normal and breath sounds normal. No respiratory distress. She has no wheezes.   Abdominal: Soft. Bowel sounds are normal. She exhibits no distension. There is no tenderness.    Musculoskeletal:        Right wrist: She exhibits normal range of motion and no tenderness.        Left wrist: She exhibits normal range of motion and no tenderness.   Neurological: She is alert and oriented to person, place, and time.   Skin: Skin is warm and dry. No rash noted. She is not diaphoretic. No erythema.   Psychiatric: She has a normal mood and affect. Her behavior is normal.   Nursing note and vitals reviewed.      Assessment:       1. Screening for malignant neoplasm of colon    2. Abdominal pain, unspecified abdominal location    3. Anemia, unspecified type        Plan:   1. Reviewed imaging with her  2. EGD for abdominal pain/anemia  3. Discussed colon cancer screening given her history with her - plan colonoscopy

## 2018-12-14 ENCOUNTER — TELEPHONE (OUTPATIENT)
Dept: ENDOSCOPY | Facility: HOSPITAL | Age: 43
End: 2018-12-14

## 2018-12-14 NOTE — TELEPHONE ENCOUNTER
Left message instructing patient to call dept @ [phone] between 8am-4pm.    Arrival time given @7340  2nd portion of prep @5970  NPO (for clear liquids) @ 1657

## 2018-12-17 ENCOUNTER — TELEPHONE (OUTPATIENT)
Dept: ENDOSCOPY | Facility: HOSPITAL | Age: 43
End: 2018-12-17

## 2018-12-17 NOTE — TELEPHONE ENCOUNTER
Spoke with patient about arrival time @. 4038 .     Prep instructions reviewed: the day before the procedure, follow a clear liquid diet all day, then start the first 1/2 of prep at 5pm and take 2nd 1/2 of prep @   0245   .  Pt must be completely NPO when prep completed @ 0245         .    Medications: Do not take Insulin or oral diabetic medications the day of the procedure.  Take as prescribed: heart, seizure and blood pressure medication in the morning with a sip of water (less than an ounce).  Take any breathing medications and bring inhalers to hospital with you Leave all valuables and jewelry at home.     Wear comfortable clothes to procedure to change into hospital gown You cannot drive for 24 hours after your procedure because you will receive sedation for your procedure to make you comfortable.  A ride must be provided at discharge.

## 2018-12-18 ENCOUNTER — ANESTHESIA (OUTPATIENT)
Dept: ENDOSCOPY | Facility: HOSPITAL | Age: 43
End: 2018-12-18
Payer: COMMERCIAL

## 2018-12-18 ENCOUNTER — HOSPITAL ENCOUNTER (OUTPATIENT)
Facility: HOSPITAL | Age: 43
Discharge: HOME | End: 2018-12-18
Attending: INTERNAL MEDICINE | Admitting: INTERNAL MEDICINE
Payer: COMMERCIAL

## 2018-12-18 ENCOUNTER — ANESTHESIA EVENT (OUTPATIENT)
Dept: ENDOSCOPY | Facility: HOSPITAL | Age: 43
End: 2018-12-18
Payer: COMMERCIAL

## 2018-12-18 VITALS
HEIGHT: 61 IN | OXYGEN SATURATION: 100 % | HEART RATE: 72 BPM | RESPIRATION RATE: 14 BRPM | SYSTOLIC BLOOD PRESSURE: 105 MMHG | WEIGHT: 120 LBS | BODY MASS INDEX: 22.66 KG/M2 | TEMPERATURE: 98 F | DIASTOLIC BLOOD PRESSURE: 67 MMHG

## 2018-12-18 DIAGNOSIS — E84.9 CYSTIC FIBROSIS: Primary | ICD-10-CM

## 2018-12-18 DIAGNOSIS — Z12.11 SCREENING FOR MALIGNANT NEOPLASM OF COLON: ICD-10-CM

## 2018-12-18 LAB
B-HCG UR QL: NEGATIVE
CTP QC/QA: YES
POCT GLUCOSE: 137 MG/DL (ref 70–110)

## 2018-12-18 PROCEDURE — 25000003 PHARM REV CODE 250: Performed by: INTERNAL MEDICINE

## 2018-12-18 PROCEDURE — 81025 URINE PREGNANCY TEST: CPT | Performed by: INTERNAL MEDICINE

## 2018-12-18 PROCEDURE — 43239 EGD BIOPSY SINGLE/MULTIPLE: CPT | Mod: 51,,, | Performed by: INTERNAL MEDICINE

## 2018-12-18 PROCEDURE — 45380 COLONOSCOPY AND BIOPSY: CPT | Mod: ,,, | Performed by: INTERNAL MEDICINE

## 2018-12-18 PROCEDURE — 43239 EGD BIOPSY SINGLE/MULTIPLE: CPT | Performed by: INTERNAL MEDICINE

## 2018-12-18 PROCEDURE — 37000008 HC ANESTHESIA 1ST 15 MINUTES: Performed by: INTERNAL MEDICINE

## 2018-12-18 PROCEDURE — 37000009 HC ANESTHESIA EA ADD 15 MINS: Performed by: INTERNAL MEDICINE

## 2018-12-18 PROCEDURE — 27201012 HC FORCEPS, HOT/COLD, DISP: Performed by: INTERNAL MEDICINE

## 2018-12-18 PROCEDURE — 88305 TISSUE EXAM BY PATHOLOGIST: CPT | Mod: 26,,, | Performed by: PATHOLOGY

## 2018-12-18 PROCEDURE — 45380 COLONOSCOPY AND BIOPSY: CPT | Performed by: INTERNAL MEDICINE

## 2018-12-18 PROCEDURE — 88305 TISSUE EXAM BY PATHOLOGIST: CPT

## 2018-12-18 PROCEDURE — 63600175 PHARM REV CODE 636 W HCPCS: Performed by: NURSE ANESTHETIST, CERTIFIED REGISTERED

## 2018-12-18 PROCEDURE — 88342 IMHCHEM/IMCYTCHM 1ST ANTB: CPT | Mod: 26,,, | Performed by: PATHOLOGY

## 2018-12-18 RX ORDER — PROPOFOL 10 MG/ML
VIAL (ML) INTRAVENOUS
Status: DISCONTINUED | OUTPATIENT
Start: 2018-12-18 | End: 2018-12-18

## 2018-12-18 RX ORDER — PROPOFOL 10 MG/ML
VIAL (ML) INTRAVENOUS CONTINUOUS PRN
Status: DISCONTINUED | OUTPATIENT
Start: 2018-12-18 | End: 2018-12-18

## 2018-12-18 RX ORDER — LIDOCAINE HCL/PF 100 MG/5ML
SYRINGE (ML) INTRAVENOUS
Status: DISCONTINUED | OUTPATIENT
Start: 2018-12-18 | End: 2018-12-18

## 2018-12-18 RX ORDER — SODIUM CHLORIDE 0.9 % (FLUSH) 0.9 %
3 SYRINGE (ML) INJECTION
Status: DISCONTINUED | OUTPATIENT
Start: 2018-12-18 | End: 2018-12-18 | Stop reason: HOSPADM

## 2018-12-18 RX ORDER — SODIUM CHLORIDE 9 MG/ML
INJECTION, SOLUTION INTRAVENOUS CONTINUOUS
Status: DISCONTINUED | OUTPATIENT
Start: 2018-12-18 | End: 2018-12-18 | Stop reason: HOSPADM

## 2018-12-18 RX ADMIN — LIDOCAINE HYDROCHLORIDE 100 MG: 20 INJECTION, SOLUTION INTRAVENOUS at 08:12

## 2018-12-18 RX ADMIN — PROPOFOL 80 MG: 10 INJECTION, EMULSION INTRAVENOUS at 08:12

## 2018-12-18 RX ADMIN — PROPOFOL 150 MCG/KG/MIN: 10 INJECTION, EMULSION INTRAVENOUS at 08:12

## 2018-12-18 RX ADMIN — SODIUM CHLORIDE: 0.9 INJECTION, SOLUTION INTRAVENOUS at 08:12

## 2018-12-18 NOTE — [employer] PATIENT INSTRUCTIONS
Discharge Summary/Instructions after an Endoscopic Procedure  Patient Name: Olinda Lombardi  Patient MRN: [id number]  Patient YOB: 1975 Tuesday, December 18, 2018  Ernestina Onofre MD  RESTRICTIONS:  During your procedure today, you received medications for sedation.  These   medications may affect your judgment, balance and coordination.  Therefore,   for 24 hours, you have the following restrictions:   - DO NOT drive a car, operate machinery, make legal/financial decisions,   sign important papers or drink alcohol.    ACTIVITY:  Today: no heavy lifting, straining or running due to procedural   sedation/anesthesia.  The following day: return to full activity including work.  DIET:  Eat and drink normally unless instructed otherwise.     TREATMENT FOR COMMON SIDE EFFECTS:  - Mild abdominal pain, nausea, belching, bloating or excessive gas:  rest,   eat lightly and use a heating pad.  - Sore Throat: treat with throat lozenges and/or gargle with warm salt   water.  - Because air was used during the procedure, expelling large amounts of air   from your rectum or belching is normal.  - If a bowel prep was taken, you may not have a bowel movement for 1-3 days.    This is normal.  SYMPTOMS TO WATCH FOR AND REPORT TO YOUR PHYSICIAN:  1. Abdominal pain or bloating, other than gas cramps.  2. Chest pain.  3. Back pain.  4. Signs of infection such as: chills or fever occurring within 24 hours   after the procedure.  5. Rectal bleeding, which would show as bright red, maroon, or black stools.   (A tablespoon of blood from the rectum is not serious, especially if   hemorrhoids are present.)  6. Vomiting.  7. Weakness or dizziness.  GO DIRECTLY TO THE NEAREST EMERGENCY ROOM IF YOU HAVE ANY OF THE FOLLOWING:      Difficulty breathing              Chills and/or fever over 101 F   Persistent vomiting and/or vomiting blood   Severe abdominal pain   Severe chest pain   Black, tarry stools   Bleeding- more than one  tablespoon   Any other symptom or condition that you feel may need urgent attention  Your doctor recommends these additional instructions:  If any biopsies were taken, your doctors clinic will contact you in 1 to 2   weeks with any results.  - Discharge patient to home (via wheelchair).   - Patient has a contact number available for emergencies.  The signs and   symptoms of potential delayed complications were discussed with the   patient.  Return to normal activities tomorrow.  Written discharge   instructions were provided to the patient.   - Resume previous diet.   - Continue present medications.   - Await pathology results.  For questions, problems or results please call your physician - Ernestina Onofre MD at Work:  [phone].  EMERGENCY PHONE NUMBER: [phone],  LAB RESULTS: [phone]  IF A COMPLICATION OR EMERGENCY SITUATION ARISES AND YOU ARE UNABLE TO REACH   YOUR PHYSICIAN - GO DIRECTLY TO THE EMERGENCY ROOM.  Ernestina Onofre MD  12/18/2018 8:56:54 AM  This report has been verified and signed electronically.  [employer]

## 2018-12-18 NOTE — [employer] PATIENT INSTRUCTIONS
Discharge Summary/Instructions after an Endoscopic Procedure  Patient Name: Olinda Lombardi  Patient MRN: [id number]  Patient YOB: 1975 Tuesday, December 18, 2018  Ernestina Onofre MD  RESTRICTIONS:  During your procedure today, you received medications for sedation.  These   medications may affect your judgment, balance and coordination.  Therefore,   for 24 hours, you have the following restrictions:   - DO NOT drive a car, operate machinery, make legal/financial decisions,   sign important papers or drink alcohol.    ACTIVITY:  Today: no heavy lifting, straining or running due to procedural   sedation/anesthesia.  The following day: return to full activity including work.  DIET:  Eat and drink normally unless instructed otherwise.     TREATMENT FOR COMMON SIDE EFFECTS:  - Mild abdominal pain, nausea, belching, bloating or excessive gas:  rest,   eat lightly and use a heating pad.  - Sore Throat: treat with throat lozenges and/or gargle with warm salt   water.  - Because air was used during the procedure, expelling large amounts of air   from your rectum or belching is normal.  - If a bowel prep was taken, you may not have a bowel movement for 1-3 days.    This is normal.  SYMPTOMS TO WATCH FOR AND REPORT TO YOUR PHYSICIAN:  1. Abdominal pain or bloating, other than gas cramps.  2. Chest pain.  3. Back pain.  4. Signs of infection such as: chills or fever occurring within 24 hours   after the procedure.  5. Rectal bleeding, which would show as bright red, maroon, or black stools.   (A tablespoon of blood from the rectum is not serious, especially if   hemorrhoids are present.)  6. Vomiting.  7. Weakness or dizziness.  GO DIRECTLY TO THE NEAREST EMERGENCY ROOM IF YOU HAVE ANY OF THE FOLLOWING:      Difficulty breathing              Chills and/or fever over 101 F   Persistent vomiting and/or vomiting blood   Severe abdominal pain   Severe chest pain   Black, tarry stools   Bleeding- more than one  tablespoon   Any other symptom or condition that you feel may need urgent attention  Your doctor recommends these additional instructions:  If any biopsies were taken, your doctors clinic will contact you in 1 to 2   weeks with any results.  - Discharge patient to home (via wheelchair).   - Patient has a contact number available for emergencies.  The signs and   symptoms of potential delayed complications were discussed with the   patient.  Return to normal activities tomorrow.  Written discharge   instructions were provided to the patient.   - Resume previous diet.   - Continue present medications.   - Await pathology results.   - Repeat colonoscopy date to be determined after pending pathology results   are reviewed for surveillance.   - If bleeding continues would treat with hemorrhoidal suppositories;   consider colorectal apt  - High fiber diet  For questions, problems or results please call your physician - Ernestina Onofre MD at Work:  [phone].  EMERGENCY PHONE NUMBER: [phone],  LAB RESULTS: [phone]  IF A COMPLICATION OR EMERGENCY SITUATION ARISES AND YOU ARE UNABLE TO REACH   YOUR PHYSICIAN - GO DIRECTLY TO THE EMERGENCY ROOM.  Ernestina Onofre MD  12/18/2018 9:39:51 AM  This report has been verified and signed electronically.  [employer]

## 2018-12-18 NOTE — ANESTHESIA PREPROCEDURE EVALUATION
12/18/2018  Olinda Lombardi is a 43 y.o., female for EGD and colonoscopy under MAC    Past Medical History:   Diagnosis Date    Abnormal Pap smear of vagina     Bilateral carpal tunnel syndrome     Cystic fibrosis     Cystic fibrosis     Diabetes mellitus     CFRD         Anesthesia Evaluation    I have reviewed the Patient Summary Reports.    I have reviewed the Nursing Notes.   I have reviewed the Medications.     Review of Systems  Social:  Alcohol Use, Non-Smoker        Physical Exam  General:  Well nourished    Airway/Jaw/Neck:  Airway Findings: Mallampati: II      Chest/Lungs:  Chest/Lungs Clear    Heart/Vascular:  Heart Findings: Normal            Anesthesia Plan  Type of Anesthesia, risks & benefits discussed:  Anesthesia Type:  MAC  Patient's Preference:   Intra-op Monitoring Plan:   Intra-op Monitoring Plan Comments:   Post Op Pain Control Plan:   Post Op Pain Control Plan Comments:   Induction:    Beta Blocker:  Patient is not currently on a Beta-Blocker (No further documentation required).       Informed Consent: Patient understands risks and agrees with Anesthesia plan.  Questions answered. Anesthesia consent signed with patient.  ASA Score: 2     Day of Surgery Review of History & Physical:            Ready For Surgery From Anesthesia Perspective.

## 2018-12-18 NOTE — OR NURSING
Discharge Instructions given to patient. Verbalized understanding. Alert and oriented x4, VSS.  Waiting on MD to speak with patient.

## 2018-12-18 NOTE — TRANSFER OF CARE
"Anesthesia Transfer of Care Note    Patient: Olinda Lombardi    Procedure(s) Performed: Procedure(s) (LRB):  ESOPHAGOGASTRODUODENOSCOPY (EGD) (N/A)  COLONOSCOPY (N/A)    Patient location: GI    Anesthesia Type: MAC    Transport from OR: Transported from OR on room air with adequate spontaneous ventilation    Post pain: adequate analgesia    Post assessment: tolerated procedure well and no apparent anesthetic complications    Post vital signs: stable    Level of consciousness: awake, alert and oriented    Nausea/Vomiting: no nausea/vomiting    Complications: none    Transfer of care protocol was followed      Last vitals:   Visit Vitals  /75   Pulse 78   Temp 37.2 °C (99 °F)   Resp 18   Ht 5' 1" (1.549 m)   Wt 54.4 kg (120 lb)   SpO2 100%   Breastfeeding? No   BMI 22.67 kg/m²     "

## 2018-12-18 NOTE — ANESTHESIA POSTPROCEDURE EVALUATION
"Anesthesia Post Evaluation    Patient: Olinda Lombardi    Procedure(s) Performed: Procedure(s) (LRB):  ESOPHAGOGASTRODUODENOSCOPY (EGD) (N/A)  COLONOSCOPY (N/A)    Final Anesthesia Type: MAC  Patient location during evaluation: GI PACU  Patient participation: Yes- Able to Participate  Level of consciousness: awake and alert and oriented  Post-procedure vital signs: reviewed and stable  Pain management: adequate  Airway patency: patent  PONV status at discharge: No PONV  Anesthetic complications: no      Cardiovascular status: blood pressure returned to baseline, hemodynamically stable and stable  Respiratory status: unassisted, spontaneous ventilation and room air  Hydration status: euvolemic  Follow-up not needed.        Visit Vitals  /75   Pulse 78   Temp 37.2 °C (99 °F)   Resp 18   Ht 5' 1" (1.549 m)   Wt 54.4 kg (120 lb)   SpO2 100%   Breastfeeding? No   BMI 22.67 kg/m²       Pain/Glenn Score: No Data Recorded      "

## 2019-01-02 ENCOUNTER — PATIENT MESSAGE (OUTPATIENT)
Dept: GASTROENTEROLOGY | Facility: CLINIC | Age: 44
End: 2019-01-02

## 2019-01-03 ENCOUNTER — TELEPHONE (OUTPATIENT)
Dept: GASTROENTEROLOGY | Facility: CLINIC | Age: 44
End: 2019-01-03

## 2019-01-03 NOTE — TELEPHONE ENCOUNTER
----- Message from Olinda Garner sent at 1/3/2019  4:19 PM CST -----  Contact: self / [phone]  Patient is requesting a call back regarding , needs her lab results. Please advise

## 2019-02-12 DIAGNOSIS — E84.8 DIABETES MELLITUS RELATED TO CYSTIC FIBROSIS: Primary | ICD-10-CM

## 2019-02-12 DIAGNOSIS — E08.9 DIABETES MELLITUS RELATED TO CYSTIC FIBROSIS: Primary | ICD-10-CM

## 2019-02-13 RX ORDER — INSULIN PUMP SYRINGE, 3 ML
EACH MISCELLANEOUS
Qty: 1 EACH | Refills: 0 | Status: SHIPPED | OUTPATIENT
Start: 2019-02-13 | End: 2021-05-05

## 2019-02-13 RX ORDER — LANCETS
EACH MISCELLANEOUS
Qty: 400 EACH | Refills: 3 | Status: SHIPPED | OUTPATIENT
Start: 2019-02-13 | End: 2021-05-13

## 2019-02-19 ENCOUNTER — PATIENT MESSAGE (OUTPATIENT)
Dept: TRANSPLANT | Facility: CLINIC | Age: 44
End: 2019-02-19

## 2019-03-21 ENCOUNTER — OFFICE VISIT (OUTPATIENT)
Dept: TRANSPLANT | Facility: CLINIC | Age: 44
End: 2019-03-21
Payer: COMMERCIAL

## 2019-03-21 ENCOUNTER — PATIENT MESSAGE (OUTPATIENT)
Dept: RHEUMATOLOGY | Facility: CLINIC | Age: 44
End: 2019-03-21

## 2019-03-21 ENCOUNTER — HOSPITAL ENCOUNTER (OUTPATIENT)
Dept: PULMONOLOGY | Facility: CLINIC | Age: 44
Discharge: HOME | End: 2019-03-21
Payer: COMMERCIAL

## 2019-03-21 ENCOUNTER — LAB VISIT (OUTPATIENT)
Dept: LAB | Facility: HOSPITAL | Age: 44
End: 2019-03-21
Attending: INTERNAL MEDICINE
Payer: COMMERCIAL

## 2019-03-21 VITALS
DIASTOLIC BLOOD PRESSURE: 80 MMHG | SYSTOLIC BLOOD PRESSURE: 127 MMHG | BODY MASS INDEX: 22.66 KG/M2 | HEIGHT: 61 IN | RESPIRATION RATE: 20 BRPM | HEART RATE: 80 BPM | TEMPERATURE: 97 F | OXYGEN SATURATION: 100 % | WEIGHT: 120 LBS

## 2019-03-21 DIAGNOSIS — E84.9 CYSTIC FIBROSIS: ICD-10-CM

## 2019-03-21 DIAGNOSIS — E84.0 CYSTIC FIBROSIS WITH PULMONARY MANIFESTATIONS: Primary | ICD-10-CM

## 2019-03-21 DIAGNOSIS — E08.9 DIABETES MELLITUS RELATED TO CYSTIC FIBROSIS: ICD-10-CM

## 2019-03-21 DIAGNOSIS — E84.8 DIABETES MELLITUS RELATED TO CYSTIC FIBROSIS: ICD-10-CM

## 2019-03-21 DIAGNOSIS — K86.89 PANCREATIC INSUFFICIENCY: ICD-10-CM

## 2019-03-21 LAB
25(OH)D3+25(OH)D2 SERPL-MCNC: 22 NG/ML
PRE FEV1 FVC: 66
PRE FEV1: 2
PRE FVC: 3.03
PREDICTED FEV1 FVC: 86
PREDICTED FEV1: 2.56
PREDICTED FVC: 3.02

## 2019-03-21 PROCEDURE — 84597 ASSAY OF VITAMIN K: CPT

## 2019-03-21 PROCEDURE — 99999 PR PBB SHADOW E&M-EST. PATIENT-LVL V: ICD-10-PCS | Mod: PBBFAC,,, | Performed by: INTERNAL MEDICINE

## 2019-03-21 PROCEDURE — 3008F PR BODY MASS INDEX (BMI) DOCUMENTED: ICD-10-PCS | Mod: CPTII,S$GLB,, | Performed by: INTERNAL MEDICINE

## 2019-03-21 PROCEDURE — 84590 ASSAY OF VITAMIN A: CPT

## 2019-03-21 PROCEDURE — 99213 PR OFFICE/OUTPT VISIT, EST, LEVL III, 20-29 MIN: ICD-10-PCS | Mod: 25,S$GLB,, | Performed by: INTERNAL MEDICINE

## 2019-03-21 PROCEDURE — 94010 BREATHING CAPACITY TEST: ICD-10-PCS | Mod: S$GLB,,, | Performed by: INTERNAL MEDICINE

## 2019-03-21 PROCEDURE — 99999 PR PBB SHADOW E&M-EST. PATIENT-LVL V: CPT | Mod: PBBFAC,,, | Performed by: INTERNAL MEDICINE

## 2019-03-21 PROCEDURE — 84446 ASSAY OF VITAMIN E: CPT

## 2019-03-21 PROCEDURE — 94010 BREATHING CAPACITY TEST: CPT | Mod: S$GLB,,, | Performed by: INTERNAL MEDICINE

## 2019-03-21 PROCEDURE — 3008F BODY MASS INDEX DOCD: CPT | Mod: CPTII,S$GLB,, | Performed by: INTERNAL MEDICINE

## 2019-03-21 PROCEDURE — 99213 OFFICE O/P EST LOW 20 MIN: CPT | Mod: 25,S$GLB,, | Performed by: INTERNAL MEDICINE

## 2019-03-21 PROCEDURE — 82306 VITAMIN D 25 HYDROXY: CPT

## 2019-03-21 RX ORDER — DAPSONE 75 MG/G
1 GEL TOPICAL DAILY
COMMUNITY
Start: 2018-12-17 | End: 2021-05-05

## 2019-03-21 RX ORDER — TRAZODONE HYDROCHLORIDE 100 MG/1
1 TABLET ORAL NIGHTLY PRN
COMMUNITY
Start: 2018-12-17

## 2019-03-21 NOTE — PROGRESS NOTES
"LUNG TRANSPLANT PRE FOLLOW-UP    Referring Physician:  Self referral    Reason for Visit:  Pre-lung transplant follow-up.         Date of Initial Evaluation:                                                                                              History of Present Illness: Olinda Lombardi is a 43 y.o. female who is on 0L of oxygen. She is on no assisted ventilation.  Her New [place] Heart Association Class is II and a Karnofsky score of 90% - Able to carry on normal activity: minor symptoms of disease. She is diabetic insulin dependent     Patient presents today for routine follow up for cystic fibrosis. She reports she is feeling more congested for last 6 months. She is not taking Chester or Pulmozyme and only is doing HyperSal (3-4 time a week). She has cough with yellow to green sputum production. Patient denies SOB, chest pain, palpitations. Patient reports chest tightness following nebulizer treatments and states that she is doing the Xopenex immediately prior to starting the other inhaled therapies. She states that her sinus congestion is well controlled and she only has slight post nasal drainage currently.    She also has complaint of generalized musculoskeletal pain involving upper back, legs, shoulders. She has been taking Ibuprofen and Tylenol every day. Patient currently denies hematuria, abdominal pain, nausea, and vomiting. Denies fevers, chills, dysuria, urinary urgency/frequency.    Her abdominal symptoms have improved after starting to take 8 tabs of ZenPep (she changed it by herself). She underwent upper and lower endoscopy     ROS  Objective:   /80   Pulse 80   Temp 97 °F (36.1 °C) (Oral)   Resp 20   Ht 5' 1" (1.549 m)   Wt 54.4 kg (120 lb)   SpO2 100%   BMI 22.67 kg/m²   Physical Exam   Constitutional: She is oriented to person, place, and time and well-developed, well-nourished, and in no distress. No distress.   HENT:   Head: Normocephalic and atraumatic.   Eyes: EOM are " normal.   Neck: Neck supple. No tracheal deviation present.   Cardiovascular: Normal rate and regular rhythm. Exam reveals no friction rub.   No murmur heard.  Pulmonary/Chest: Effort normal and breath sounds normal. No stridor. No respiratory distress. She has no wheezes. She has no rales.   Abdominal: Soft. Bowel sounds are normal. She exhibits no distension. There is no tenderness.   Musculoskeletal: She exhibits no edema or tenderness.   Neurological: She is alert and oriented to person, place, and time. GCS score is 15.   Skin: Skin is warm and dry.   Psychiatric: Mood, affect and judgment normal.     Labs:  Lab Visit on 03/21/2019   Component Date Value    WBC 03/21/2019 4.82     RBC 03/21/2019 4.09     Hemoglobin 03/21/2019 11.0*    Hematocrit 03/21/2019 34.5*    MCV 03/21/2019 84     MCH 03/21/2019 26.9*    MCHC 03/21/2019 31.9*    RDW 03/21/2019 12.8     Platelets 03/21/2019 331     MPV 03/21/2019 9.4     Immature Granulocytes 03/21/2019 0.4     Gran # (ANC) 03/21/2019 2.7     Immature Grans (Abs) 03/21/2019 0.02     Lymph # 03/21/2019 1.6     Mono # 03/21/2019 0.4     Eos # 03/21/2019 0.2     Baso # 03/21/2019 0.05     nRBC 03/21/2019 0     Gran% 03/21/2019 55.0     Lymph% 03/21/2019 32.4     Mono% 03/21/2019 7.7     Eosinophil% 03/21/2019 3.5     Basophil% 03/21/2019 1.0     Differential Method 03/21/2019 Automated        Pulmonary Function Tests 3/21/2019 9/11/2018 6/12/2018 3/13/2018 8/31/2016 8/5/2015 10/29/2014   FVC 3.03 2.95 3.09 3.07 3.15 3.29 3.26   FEV1 2 2.02 2.04 2.01 2.09 2.14 2.16   FVC% 101 98 103 98 94 98 97   FEV1% 78 79 80 77 76 77 77   FEF 25-75 1.05 1.16 1.11 1.08 1.13 98 1.12   FEF 25-75% 36 39 38 36 38 32 36     No flowsheet data found.    Assessment:-  1. Cystic fibrosis with pulmonary manifestations    2. 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 Pancreatic insufficiency      Plan:    1. Patient with very poor adherence and compliance with the  pulmonary toilet and CF therapy. Her FEV1 shows a gradual decline in lung function. We reviewed it with her and counseled her strongly to resume and continue taking all of her inhaled therapies, including Pulmozyme, HyperSal, and GAMA. She has had worsening chest congestion now. Sputum is collected for cultures, which will be followed up and we will prescribe ABx per its review. Add on CBC, CMP, hsCRP for further work up.     2. Continue with GI follow up for pancreatic insufficiency and Endocrinology for DM. Will refer to rheum for ?CF related arthropathy (She is going to look up and decide which rheumatologist she prefers).     3.  Follow up in 3 months or earlier if needed.      Nishith Mewada, MD, FACP  Fellow, Pulmonary-Critical Care  [facility]    Attending Note:    I have seen and evaluated the patient with the fellow. Their note reflects the content of our discussion and my plan of care.      Chadwick Pate MD  Pulmonary/Critical Care Medicine

## 2019-03-22 DIAGNOSIS — E84.9 CYSTIC FIBROSIS: Primary | ICD-10-CM

## 2019-03-22 DIAGNOSIS — M25.50 ARTHRALGIA, UNSPECIFIED JOINT: ICD-10-CM

## 2019-03-26 LAB
A-TOCOPHEROL VIT E SERPL-MCNC: 747 UG/DL (ref 500–1800)
VIT A SERPL-MCNC: 29 UG/DL (ref 38–106)

## 2019-03-28 ENCOUNTER — TELEPHONE (OUTPATIENT)
Dept: TRANSPLANT | Facility: CLINIC | Age: 44
End: 2019-03-28

## 2019-03-28 DIAGNOSIS — A49.8 PSEUDOMONAS AERUGINOSA INFECTION: ICD-10-CM

## 2019-03-28 DIAGNOSIS — E84.9 CF (CYSTIC FIBROSIS): Primary | ICD-10-CM

## 2019-03-28 DIAGNOSIS — E84.9 CYSTIC FIBROSIS: Primary | ICD-10-CM

## 2019-03-28 DIAGNOSIS — A49.01 STAPHYLOCOCCUS AUREUS INFECTION: ICD-10-CM

## 2019-03-28 RX ORDER — SULFAMETHOXAZOLE AND TRIMETHOPRIM 800; 160 MG/1; MG/1
1 TABLET ORAL 2 TIMES DAILY
Qty: 28 TABLET | Refills: 0 | Status: SHIPPED | OUTPATIENT
Start: 2019-03-28 | End: 2019-04-11

## 2019-03-28 NOTE — TELEPHONE ENCOUNTER
Spoke with King at [facility].  She asked if pt could get a midline rather than a PICC.  Dr. Pate approves.  Notified King that Dr. Pate is ok with a midline placement.  Notified patient of this and that she needs to call King to schedule at [phone].  Explained that we can reschedule the lab appointment that was scheduled for tomorrow afternoon as it will interfere with line placement and infusion.  Pt verbalized understanding.

## 2019-03-28 NOTE — TELEPHONE ENCOUNTER
Discussed results of patient's recent sputum specimen with Dr. Pate.  He asked what reaction patient had to Penicllin.  Per patient, she states she broke out in a rash after taking it as a young child.  Dr. Pate would like for patient to begin Bactrim DS PO BID for 14 days and IV Meropenem 2gm q8h IV for 14 days.  PICC line to be placed at [facility] and first dose to be administered there.  IV antibiotic administration education to take place by [facility] with patient.  CBC and CMP labs to be drawn in one week. Weekly dressing changes to be performed at [facility].  Pt to f/u in clinic after final dose.  Informed patient of the above instructions and that someone from [facility] would be in contact regarding getting her line placed and infusions started.  Oral Bactrim to be sent to [facility] at the request of the patient.    Pt asked why in 41 years has she never had to get IV therapy, and now all of the sudden she does.  Explained that with her decline in PFT's, and chest congestion, she needs to be treated.  Encouraged compliance with CF therapies.    Pt had questions regarding medical therapy for her specific CF genotype.  Dr. Pate would like CF genetic analysis performed.  Notified patient of this and scheduled lab to be drawn 3/29 at 2:00 per patient's request.    Pt also asked about Iron infusions.  Dr. Pate states that he did not order iron studies and therefore will not order iron infusions.  Notified patient of this.    ----- Message from Che Rm sent at 3/27/2019  5:40 PM CDT -----  Contact: self  Pt is calling in regards to IV therapy. Pt has a few questions before scheduling.    Pt can be reached at [phone].    Thank you

## 2019-03-29 ENCOUNTER — PATIENT MESSAGE (OUTPATIENT)
Dept: TRANSPLANT | Facility: CLINIC | Age: 44
End: 2019-03-29

## 2019-03-29 ENCOUNTER — TELEPHONE (OUTPATIENT)
Dept: TRANSPLANT | Facility: CLINIC | Age: 44
End: 2019-03-29

## 2019-03-29 LAB — PHYTONADIONE SERPL-MCNC: 0.53 NMOL/L (ref 0.22–4.88)

## 2019-03-29 NOTE — TELEPHONE ENCOUNTER
"King states patient canceled midline placement for this afternoon stating that she will just take the oral antibiotics for now.  She stated, "I think they are punishing me for not doing my therapies like I am supposed to".  LM for patient urging her to keep her appointment for midline placement and initiation of IV antibiotics.  Explained the oral abx are for staph and IV is for pseudomonas, they are treating different things. Explained that she is resistant to oral therapy for pseudomonas.      Spoke with patient who states she has to "go with my gut and not do the antibiotics because I don't feel that bad.  I am congested in the morning and evening, but not throughout the day".  Pt states she does not understand if she has had pseudomonas for as long as she can remember, why would she need IV abx now and wants to know if the IV therapy will get rid of the pseudomonas.  Explained that she is colonized with pseudomonas and will not get rid of it, but can treat exacerbations that she has.  Explained that antibiotic therapy is ordered based on decrease in PFT's and her symptoms.      Pt states, "I am not trying to be difficult, I just don't think I need it".  Explained that I have made her aware of the recommendations and if she is choosing to not follow those recommendations, I would document it and make Dr. Pate aware.  Pt verbalized understanding.    Notified Dr. Pate.    ----- Message from Michelle Caldwell sent at 3/29/2019 11:54 AM CDT -----  Contact: Patient  Needs Advice    Reason for call: Called to inform coordinator that patient cancelled her appt.         Communication Preference: Feli [phone]    Additional Information: n/a        "

## 2019-04-02 ENCOUNTER — TELEPHONE (OUTPATIENT)
Dept: TRANSPLANT | Facility: CLINIC | Age: 44
End: 2019-04-02

## 2019-04-02 NOTE — TELEPHONE ENCOUNTER
Notified patient that Dr. Pate would like to see her when the Bactrim is complete.  Offered to schedule her 4/12 and notified her that we would schedule genetic mutation lab work to be drawn that day as well.  She verbalized understanding.

## 2019-04-12 ENCOUNTER — PATIENT MESSAGE (OUTPATIENT)
Dept: TRANSPLANT | Facility: CLINIC | Age: 44
End: 2019-04-12

## 2019-04-29 ENCOUNTER — PATIENT MESSAGE (OUTPATIENT)
Dept: TRANSPLANT | Facility: CLINIC | Age: 44
End: 2019-04-29

## 2019-04-29 DIAGNOSIS — E84.9 CF (CYSTIC FIBROSIS): Primary | ICD-10-CM

## 2019-04-29 DIAGNOSIS — Z22.39 PSEUDOMONAS AERUGINOSA COLONIZATION: ICD-10-CM

## 2019-04-29 NOTE — TELEPHONE ENCOUNTER
Pt sent a message stating that she has completed the PO Bactrim and would like to start IV antibiotics because she is still not feeling better.  D/w Dr. Pate who would like to order IV Meropenem q8h times 14 days.  Notified patient that I would send orders to [facility], which I did at 12:00 today.  Explained that they would be in contact once they have insurance clearance to get her scheduled for midline placement and to have first dose infused in the infusion suite.  Pt verbalized understanding.

## 2019-04-30 NOTE — TELEPHONE ENCOUNTER
Confirmed receipt of IV abx orders with King at [facility].  Pt scheduled for line placement today at 1400.

## 2019-05-01 ENCOUNTER — TELEPHONE (OUTPATIENT)
Dept: TRANSPLANT | Facility: CLINIC | Age: 44
End: 2019-05-01

## 2019-05-01 ENCOUNTER — PATIENT MESSAGE (OUTPATIENT)
Dept: TRANSPLANT | Facility: CLINIC | Age: 44
End: 2019-05-01

## 2019-05-01 NOTE — TELEPHONE ENCOUNTER
Faxed med list to King.    ----- Message from Jahaira Real sent at 5/1/2019  9:00 AM CDT -----  Contact: King Malik from [facility] needs a copy of all the patient's  medication.       Fax Attn King to     Phone number       Thanks!

## 2019-05-01 NOTE — TELEPHONE ENCOUNTER
"Received notification from LESLY Rojo that patient contacted Dr. Pate after hours regarding her IV infusions.  She stated that she was not given any instructions on what to do and how to administer the antibiotics at [facility] yesterday afternoon.  Contacted Kathleen, nurse who placed midline and administered first dose in the infusion suite.  She states that the patient was very hesitant about having the midline placed and starting the IV antibiotics.  She states they spoke for about 45 minutes regarding this.  The patient finally decided to have the line placed.  Afterwards, the nurse demonstrated how to scrub the hub, SASH, and connect the antibiotic.  Once the antibiotic was complete, she had the patient perform SASH on the midline.  Pt was given cards with pictures showing how to perform these actions, along with written instructions. She was also given an after hours phone number to call with any questions or complications that she is experiencing.  Kathleen also offered to re-educate patient if she needed.     Spoke with patient to find out if she has been comfortable doing her infusions today.  She states she has been.  She states, "I freaked out and had a meltdown last night and saw blood in the line.  I couldn't remember what to do.  I called Dr. Pate and then called Rocío (surgeon) to find out what I need to do.  She didn't know what I needed to do.  Then I called the ER for help from them, but I had the wrong number.  I watched a You Tube video and I feel much better about everything today and now I am a pro."      Instructed patient to call the after hours number she was given by [facility] if she has any further issues.  She verbalized understanding and states she will.  Notified patient that the nurse at [facility] offered to re-educate her if she wished and to contact her if she decides she would like to do that.  Advised patient of her follow up appointments where she will be seen " in clinic and it will be determined whether or not antibiotics need to be extended or if the line can be removed.

## 2019-05-02 ENCOUNTER — DOCUMENTATION ONLY (OUTPATIENT)
Dept: TRANSPLANT | Facility: CLINIC | Age: 44
End: 2019-05-02

## 2019-05-07 ENCOUNTER — LAB VISIT (OUTPATIENT)
Dept: LAB | Facility: HOSPITAL | Age: 44
End: 2019-05-07
Attending: INTERNAL MEDICINE
Payer: COMMERCIAL

## 2019-05-07 DIAGNOSIS — E84.9 CF (CYSTIC FIBROSIS): ICD-10-CM

## 2019-05-07 DIAGNOSIS — A49.8 NECROBACILLOSIS: ICD-10-CM

## 2019-05-07 DIAGNOSIS — Z22.39 CARRIER, BACTERIAL DISEASE: Primary | ICD-10-CM

## 2019-05-07 LAB
BASOPHILS # BLD AUTO: 0.04 K/UL (ref 0–0.2)
BASOPHILS NFR BLD: 1 % (ref 0–1.9)
DIFFERENTIAL METHOD: ABNORMAL
EOSINOPHIL # BLD AUTO: 0.1 K/UL (ref 0–0.5)
EOSINOPHIL NFR BLD: 2.6 % (ref 0–8)
ERYTHROCYTE [DISTWIDTH] IN BLOOD BY AUTOMATED COUNT: 13.3 % (ref 11.5–14.5)
HCT VFR BLD AUTO: 36.6 % (ref 37–48.5)
HGB BLD-MCNC: 11.5 G/DL (ref 12–16)
IMM GRANULOCYTES # BLD AUTO: 0.01 K/UL (ref 0–0.04)
IMM GRANULOCYTES NFR BLD AUTO: 0.2 % (ref 0–0.5)
LYMPHOCYTES # BLD AUTO: 0.9 K/UL (ref 1–4.8)
LYMPHOCYTES NFR BLD: 22.4 % (ref 18–48)
MCH RBC QN AUTO: 26.9 PG (ref 27–31)
MCHC RBC AUTO-ENTMCNC: 31.4 G/DL (ref 32–36)
MCV RBC AUTO: 86 FL (ref 82–98)
MONOCYTES # BLD AUTO: 0.4 K/UL (ref 0.3–1)
MONOCYTES NFR BLD: 10.3 % (ref 4–15)
NEUTROPHILS # BLD AUTO: 2.7 K/UL (ref 1.8–7.7)
NEUTROPHILS NFR BLD: 63.5 % (ref 38–73)
NRBC BLD-RTO: 0 /100 WBC
PLATELET # BLD AUTO: 312 K/UL (ref 150–350)
PMV BLD AUTO: 9.6 FL (ref 9.2–12.9)
RBC # BLD AUTO: 4.28 M/UL (ref 4–5.4)
WBC # BLD AUTO: 4.19 K/UL (ref 3.9–12.7)

## 2019-05-07 PROCEDURE — 85025 COMPLETE CBC W/AUTO DIFF WBC: CPT

## 2019-05-07 PROCEDURE — 80053 COMPREHEN METABOLIC PANEL: CPT

## 2019-05-08 LAB
ALBUMIN SERPL BCP-MCNC: 3.9 G/DL (ref 3.5–5.2)
ALP SERPL-CCNC: 126 U/L (ref 55–135)
ALT SERPL W/O P-5'-P-CCNC: 61 U/L (ref 10–44)
ANION GAP SERPL CALC-SCNC: 11 MMOL/L (ref 8–16)
AST SERPL-CCNC: 72 U/L (ref 10–40)
BILIRUB SERPL-MCNC: 0.3 MG/DL (ref 0.1–1)
BUN SERPL-MCNC: 14 MG/DL (ref 6–20)
CALCIUM SERPL-MCNC: 9.6 MG/DL (ref 8.7–10.5)
CHLORIDE SERPL-SCNC: 103 MMOL/L (ref 95–110)
CO2 SERPL-SCNC: 22 MMOL/L (ref 23–29)
CREAT SERPL-MCNC: 0.7 MG/DL (ref 0.5–1.4)
EST. GFR  (AFRICAN AMERICAN): >60 ML/MIN/1.73 M^2
EST. GFR  (NON AFRICAN AMERICAN): >60 ML/MIN/1.73 M^2
GLUCOSE SERPL-MCNC: 104 MG/DL (ref 70–110)
POTASSIUM SERPL-SCNC: 3.9 MMOL/L (ref 3.5–5.1)
PROT SERPL-MCNC: 7.2 G/DL (ref 6–8.4)
SODIUM SERPL-SCNC: 136 MMOL/L (ref 136–145)

## 2019-05-09 ENCOUNTER — TELEPHONE (OUTPATIENT)
Dept: TRANSPLANT | Facility: CLINIC | Age: 44
End: 2019-05-09

## 2019-05-09 ENCOUNTER — PATIENT MESSAGE (OUTPATIENT)
Dept: TRANSPLANT | Facility: CLINIC | Age: 44
End: 2019-05-09

## 2019-05-09 ENCOUNTER — HOSPITAL ENCOUNTER (EMERGENCY)
Facility: HOSPITAL | Age: 44
Discharge: HOME | End: 2019-05-09
Attending: EMERGENCY MEDICINE
Payer: COMMERCIAL

## 2019-05-09 VITALS
OXYGEN SATURATION: 100 % | TEMPERATURE: 98 F | RESPIRATION RATE: 18 BRPM | SYSTOLIC BLOOD PRESSURE: 125 MMHG | DIASTOLIC BLOOD PRESSURE: 77 MMHG | HEART RATE: 78 BPM

## 2019-05-09 DIAGNOSIS — E84.9 CYSTIC FIBROSIS: Primary | ICD-10-CM

## 2019-05-09 DIAGNOSIS — Z78.9 PROBLEM WITH VASCULAR ACCESS: Primary | ICD-10-CM

## 2019-05-09 PROCEDURE — 99282 EMERGENCY DEPT VISIT SF MDM: CPT | Mod: ,,, | Performed by: EMERGENCY MEDICINE

## 2019-05-09 PROCEDURE — 99282 PR EMERGENCY DEPT VISIT,LEVEL II: ICD-10-PCS | Mod: ,,, | Performed by: EMERGENCY MEDICINE

## 2019-05-09 PROCEDURE — 99282 EMERGENCY DEPT VISIT SF MDM: CPT

## 2019-05-09 NOTE — ED TRIAGE NOTES
Olinda Lombardi, an 44 y.o. female presents to the ED for direction on her midline.  Patient left it unclamped and blood backed up into it and she was scared to flush it because she didn't want to flush it herself and push a blood clot into her vein.  Patient did not receive adequate instructions on how to care for the line in the event that this happened.        Review of patient's allergies indicates:   Allergen Reactions    Milk containing products Diarrhea    Penicillin     Strawberry      Chief Complaint   Patient presents with    Vascular Access Problem     Pt has new midline and did not get any discharge instructions on caring for the line. Blood has backed up into the line because she left it unclamped for approx. 8 hours. She's worried about flushing it in case there's a clot in the line. Was told that it has to remain sterile, brought replacement tubing and leur locks with her.      Past Medical History:   Diagnosis Date    Abnormal Pap smear of vagina     Bilateral carpal tunnel syndrome     Cystic fibrosis     Cystic fibrosis     Diabetes mellitus     CFRD    Psoriasis

## 2019-05-09 NOTE — TELEPHONE ENCOUNTER
Lexus with [facility] called to confirm plans for IV antibiotics.  Explained that patient is scheduled to be seen in clinic on 5/13 which is the day she is due to finish therapy.      Lexus explained that the patient has been quite difficult to work with as she is accusing them of not providing education for line care.  Lexus states she personally went to patient's home on Saturday to swap out damaged IV bags.  Pt was at [facility] at the time of our conversation  Lexus states that patient did ion fact have the after hours phone number, called it last night, and did not give the nurse much time to return her call.  The supervisor did call the patient, but the patient abruptly ended the call with her stating that she was on the phone with a physician that she pays $95 and hour to treat her over the phone.  She then went to the ED for evaluation.

## 2019-05-11 ENCOUNTER — OFFICE VISIT (OUTPATIENT)
Dept: URGENT CARE | Facility: CLINIC | Age: 44
End: 2019-05-11
Payer: COMMERCIAL

## 2019-05-11 VITALS
SYSTOLIC BLOOD PRESSURE: 129 MMHG | RESPIRATION RATE: 19 BRPM | TEMPERATURE: 99 F | WEIGHT: 120 LBS | BODY MASS INDEX: 22.66 KG/M2 | DIASTOLIC BLOOD PRESSURE: 83 MMHG | OXYGEN SATURATION: 99 % | HEART RATE: 90 BPM | HEIGHT: 61 IN

## 2019-05-11 DIAGNOSIS — K21.9 GASTROESOPHAGEAL REFLUX DISEASE, ESOPHAGITIS PRESENCE NOT SPECIFIED: ICD-10-CM

## 2019-05-11 DIAGNOSIS — R10.13 EPIGASTRIC PAIN: Primary | ICD-10-CM

## 2019-05-11 PROCEDURE — 99214 OFFICE O/P EST MOD 30 MIN: CPT | Mod: S$GLB,,, | Performed by: FAMILY MEDICINE

## 2019-05-11 PROCEDURE — 93005 EKG 12-LEAD: ICD-10-PCS | Mod: S$GLB,,, | Performed by: FAMILY MEDICINE

## 2019-05-11 PROCEDURE — 93005 ELECTROCARDIOGRAM TRACING: CPT | Mod: S$GLB,,, | Performed by: FAMILY MEDICINE

## 2019-05-11 PROCEDURE — 93010 EKG 12-LEAD: ICD-10-PCS | Mod: S$GLB,,, | Performed by: INTERNAL MEDICINE

## 2019-05-11 PROCEDURE — 93010 ELECTROCARDIOGRAM REPORT: CPT | Mod: S$GLB,,, | Performed by: INTERNAL MEDICINE

## 2019-05-11 PROCEDURE — 3008F BODY MASS INDEX DOCD: CPT | Mod: CPTII,S$GLB,, | Performed by: FAMILY MEDICINE

## 2019-05-11 PROCEDURE — 99214 PR OFFICE/OUTPT VISIT, EST, LEVL IV, 30-39 MIN: ICD-10-PCS | Mod: S$GLB,,, | Performed by: FAMILY MEDICINE

## 2019-05-11 PROCEDURE — 3008F PR BODY MASS INDEX (BMI) DOCUMENTED: ICD-10-PCS | Mod: CPTII,S$GLB,, | Performed by: FAMILY MEDICINE

## 2019-05-11 RX ORDER — PANTOPRAZOLE SODIUM 40 MG/1
40 TABLET, DELAYED RELEASE ORAL DAILY
Qty: 30 TABLET | Refills: 0 | Status: SHIPPED | OUTPATIENT
Start: 2019-05-11 | End: 2021-05-05

## 2019-05-11 NOTE — PROGRESS NOTES
"Subjective:       Patient ID: Olinda Lombardi is a 44 y.o. female.    Vitals:  height is 5' 1" (1.549 m) and weight is 54.4 kg (120 lb). Her oral temperature is 98.7 °F (37.1 °C). Her blood pressure is 129/83 and her pulse is 90. Her respiration is 19 and oxygen saturation is 99%.     Chief Complaint: Chest Pain    Chest Pain    This is a new problem. The current episode started in the past 7 days (2-3 day). The onset quality is sudden. The problem occurs constantly. The problem has been unchanged. The pain is present in the epigastric region. The pain is at a severity of 0/10 (8 when it hits). The patient is experiencing no pain. The pain does not radiate. Pertinent negatives include no abdominal pain, back pain, claudication, cough, diaphoresis, dizziness, exertional chest pressure, fever, headaches, hemoptysis, irregular heartbeat, leg pain, lower extremity edema, malaise/fatigue, nausea, near-syncope, numbness, orthopnea, palpitations, PND, shortness of breath, sputum production, syncope, vomiting or weakness. The pain is aggravated by nothing. She has tried nothing for the symptoms. The treatment provided no relief. There are no known risk factors.       Constitution: Negative for chills, sweating, fatigue and fever.   HENT: Negative for congestion and sore throat.    Neck: Negative for painful lymph nodes.   Cardiovascular: Negative for chest pain, leg swelling, palpitations and passing out.   Eyes: Negative for double vision and blurred vision.   Respiratory: Negative for cough, sputum production, bloody sputum and shortness of breath.    Gastrointestinal: Negative for abdominal pain, nausea, vomiting and diarrhea.   Genitourinary: Negative for dysuria, frequency, urgency and history of kidney stones.   Musculoskeletal: Negative for joint pain, joint swelling, back pain, muscle cramps and muscle ache.   Skin: Negative for color change, pale, rash, erythema and bruising.   Allergic/Immunologic: Negative " for seasonal allergies.   Neurological: Negative for dizziness, history of vertigo, light-headedness, passing out, headaches and numbness.   Hematologic/Lymphatic: Negative for swollen lymph nodes.   Psychiatric/Behavioral: Negative for nervous/anxious, sleep disturbance and depression. The patient is not nervous/anxious.        Objective:      Physical Exam   Constitutional: She is oriented to person, place, and time. She appears well-developed and well-nourished.   HENT:   Head: Normocephalic and atraumatic.   Eyes: Pupils are equal, round, and reactive to light. EOM are normal.   Neck: Normal range of motion. Neck supple.   Cardiovascular: Normal rate, regular rhythm and normal heart sounds. Exam reveals no gallop and no friction rub.   No murmur heard.  Pulmonary/Chest: Breath sounds normal. No respiratory distress. She has no wheezes. She has no rales.   Abdominal: Soft. Bowel sounds are normal. She exhibits no distension. There is no hepatosplenomegaly. There is tenderness in the epigastric area. There is no rigidity, no rebound, no guarding, no CVA tenderness, no tenderness at McBurney's point and negative Thomason's sign.       Musculoskeletal: Normal range of motion. She exhibits no edema, tenderness or deformity.   Lymphadenopathy:     She has no cervical adenopathy.   Neurological: She is alert and oriented to person, place, and time. She displays normal reflexes. No cranial nerve deficit. She exhibits normal muscle tone. Coordination normal.   Skin: Skin is warm. Capillary refill takes less than 2 seconds. No rash noted. No erythema. No pallor.   Psychiatric: She has a normal mood and affect. Her behavior is normal. Judgment and thought content normal.   Vitals reviewed.      Assessment:       1. Epigastric pain    2. Gastroesophageal reflux disease, esophagitis presence not specified        Plan:         Epigastric pain  -     EKG 12-lead  -     (pyxis) gi cocktail (mylanta 30 mL, lidocaine 2 % viscous 10  mL, dicyclomine 10 mL) 50 mL  -     pantoprazole (PROTONIX) 40 MG tablet; Take 1 tablet (40 mg total) by mouth once daily.  Dispense: 30 tablet; Refill: 0    Gastroesophageal reflux disease, esophagitis presence not specified  -     pantoprazole (PROTONIX) 40 MG tablet; Take 1 tablet (40 mg total) by mouth once daily.  Dispense: 30 tablet; Refill: 0          Patient Instructions     Medicines for Acid Reflux  Your healthcare provider has told you that you have acid reflux. This condition causes stomach acid to wash up into your throat. For most people, acid reflux is troubling but not dangerous. But left untreated, acid reflux sometimes damages the esophagus. Medicines can help control acid reflux and limit your risk of future problems.  Medicines for acid reflux  Your healthcare provider may prescribe medicine to help treat your acid reflux. Medicine will be based on your symptoms and any test results. Your provider will explain how to take your medicine. You will also be told about possible side effects.  Reducing stomach acid  Your provider may suggest antacids that you can buy over the counter. Antacids can give fast relief. Or you may be told to take a type of medicine called H2 blockers. These are available over the counter and by prescription (for higher doses).  Blocking stomach acid  In more severe cases, your healthcare provider may suggest stronger medicines such as proton pump inhibitors (PPIs). These keep the stomach from making acid. They are often prescribed for long-term use.  Other medicines  In some cases medicines to reduce or block stomach acid may not work. Then you may be switched to another type of medicine that helps your stomach empty better.     Date Last Reviewed: 10/1/2016  © 5823-8822 [employer]. [address] [place] [address], [place] [address]. All rights reserved. This information is not intended as a substitute for professional medical care. Always follow your healthcare  professional's instructions.      Follow up with your doctor in a few days as needed.  Return to the urgent care or go to the ER if symptoms get worse.    Albino Garcia MD

## 2019-05-12 NOTE — PATIENT INSTRUCTIONS
Medicines for Acid Reflux  Your healthcare provider has told you that you have acid reflux. This condition causes stomach acid to wash up into your throat. For most people, acid reflux is troubling but not dangerous. But left untreated, acid reflux sometimes damages the esophagus. Medicines can help control acid reflux and limit your risk of future problems.  Medicines for acid reflux  Your healthcare provider may prescribe medicine to help treat your acid reflux. Medicine will be based on your symptoms and any test results. Your provider will explain how to take your medicine. You will also be told about possible side effects.  Reducing stomach acid  Your provider may suggest antacids that you can buy over the counter. Antacids can give fast relief. Or you may be told to take a type of medicine called H2 blockers. These are available over the counter and by prescription (for higher doses).  Blocking stomach acid  In more severe cases, your healthcare provider may suggest stronger medicines such as proton pump inhibitors (PPIs). These keep the stomach from making acid. They are often prescribed for long-term use.  Other medicines  In some cases medicines to reduce or block stomach acid may not work. Then you may be switched to another type of medicine that helps your stomach empty better.     Date Last Reviewed: 10/1/2016  © 5929-2655 [employer]. [address] [place] [address], [place] [address]. All rights reserved. This information is not intended as a substitute for professional medical care. Always follow your healthcare professional's instructions.      Follow up with your doctor in a few days as needed.  Return to the urgent care or go to the ER if symptoms get worse.    Albino Garcia MD

## 2019-05-13 ENCOUNTER — HOSPITAL ENCOUNTER (OUTPATIENT)
Dept: PULMONOLOGY | Facility: CLINIC | Age: 44
Discharge: HOME | End: 2019-05-13
Payer: COMMERCIAL

## 2019-05-13 ENCOUNTER — OFFICE VISIT (OUTPATIENT)
Dept: TRANSPLANT | Facility: CLINIC | Age: 44
End: 2019-05-13
Payer: COMMERCIAL

## 2019-05-13 VITALS
WEIGHT: 118 LBS | OXYGEN SATURATION: 100 % | TEMPERATURE: 98 F | DIASTOLIC BLOOD PRESSURE: 74 MMHG | SYSTOLIC BLOOD PRESSURE: 127 MMHG | RESPIRATION RATE: 20 BRPM | HEIGHT: 61 IN | HEART RATE: 89 BPM | BODY MASS INDEX: 22.28 KG/M2

## 2019-05-13 DIAGNOSIS — E84.9 CYSTIC FIBROSIS: ICD-10-CM

## 2019-05-13 DIAGNOSIS — R79.89 ELEVATED LFTS: ICD-10-CM

## 2019-05-13 DIAGNOSIS — E84.9 CYSTIC FIBROSIS: Primary | ICD-10-CM

## 2019-05-13 DIAGNOSIS — E84.0 CYSTIC FIBROSIS WITH PULMONARY MANIFESTATIONS: Primary | ICD-10-CM

## 2019-05-13 DIAGNOSIS — R10.13 DYSPEPSIA: ICD-10-CM

## 2019-05-13 LAB
PRE FEV1 FVC: 65
PRE FEV1: 2.05
PRE FVC: 3.13
PREDICTED FEV1 FVC: 85
PREDICTED FEV1: 2.54
PREDICTED FVC: 3

## 2019-05-13 PROCEDURE — 3008F PR BODY MASS INDEX (BMI) DOCUMENTED: ICD-10-PCS | Mod: CPTII,S$GLB,, | Performed by: INTERNAL MEDICINE

## 2019-05-13 PROCEDURE — 99999 PR PBB SHADOW E&M-EST. PATIENT-LVL III: CPT | Mod: PBBFAC,,, | Performed by: INTERNAL MEDICINE

## 2019-05-13 PROCEDURE — 94010 BREATHING CAPACITY TEST: ICD-10-PCS | Mod: S$GLB,,, | Performed by: INTERNAL MEDICINE

## 2019-05-13 PROCEDURE — 99213 PR OFFICE/OUTPT VISIT, EST, LEVL III, 20-29 MIN: ICD-10-PCS | Mod: 25,S$GLB,, | Performed by: INTERNAL MEDICINE

## 2019-05-13 PROCEDURE — 99999 PR PBB SHADOW E&M-EST. PATIENT-LVL III: ICD-10-PCS | Mod: PBBFAC,,, | Performed by: INTERNAL MEDICINE

## 2019-05-13 PROCEDURE — 94010 BREATHING CAPACITY TEST: CPT | Mod: S$GLB,,, | Performed by: INTERNAL MEDICINE

## 2019-05-13 PROCEDURE — 99213 OFFICE O/P EST LOW 20 MIN: CPT | Mod: 25,S$GLB,, | Performed by: INTERNAL MEDICINE

## 2019-05-13 PROCEDURE — 3008F BODY MASS INDEX DOCD: CPT | Mod: CPTII,S$GLB,, | Performed by: INTERNAL MEDICINE

## 2019-05-13 NOTE — PROGRESS NOTES
Notified Anitha with [employer] that patient's IV's will finish as scheduled and then her midline will need to be removed.  She verbalized understanding.

## 2019-05-13 NOTE — PROGRESS NOTES
CYSTIC FIBROSIS FOLLOW-UP                                                                                               History of Present Illness: Olinda Lombardi is a 44 y.o. female who is on 0L of oxygen. She is on no assisted ventilation.  Her New York Heart Association Class is II and a Karnofsky score of 90% - Able to carry on normal activity: minor symptoms of disease. She is not diabetic. She presents to clinic today to follow up after completing two weeks of meropenem for an exacerbation of her bronchiectasis. She had a host of problems while receiving her antibiotic which were all resolved. She says that she feels that her cough and congestion improved. Also had antibiotic specific problems (elevated LFT's, dyspepsia).    Has been using hypertonic saline which causes her to have bronchospasm which resolves with levalbuterol. Not using dornase, or inhaled tobramycin. Has been using her percussion vest with more frequency.    Review of Systems   Constitutional: Negative for chills, diaphoresis, fever, malaise/fatigue and weight loss.   HENT: Negative for congestion, ear discharge, ear pain, hearing loss, nosebleeds and sore throat.    Eyes: Negative for blurred vision, double vision and photophobia.   Respiratory: Negative for cough, hemoptysis, sputum production, shortness of breath and wheezing.    Cardiovascular: Negative for chest pain, palpitations, orthopnea, claudication, leg swelling and PND.   Gastrointestinal: Negative for abdominal pain, blood in stool, constipation, diarrhea, heartburn, melena, nausea and vomiting.   Genitourinary: Negative for dysuria, flank pain, frequency, hematuria and urgency.   Musculoskeletal: Negative for back pain, falls, joint pain, myalgias and neck pain.   Skin: Negative for itching and rash.   Neurological: Negative for dizziness, tremors, sensory change, loss of consciousness, weakness and headaches.   Endo/Heme/Allergies: Does not bruise/bleed easily.  "  Psychiatric/Behavioral: Negative for depression, hallucinations and memory loss. The patient is not nervous/anxious and does not have insomnia.      Objective:   /74 (BP Location: Right arm, Patient Position: Sitting, BP Method: Medium (Automatic))   Pulse 89   Temp 97.6 °F (36.4 °C) (Oral)   Resp 20   Ht 5' 1" (1.549 m)   Wt 53.5 kg (118 lb)   SpO2 100%   BMI 22.30 kg/m²   Physical Exam   Constitutional: She is oriented to person, place, and time and well-developed, well-nourished, and in no distress. No distress.   HENT:   Head: Normocephalic and atraumatic.   Nose: Nose normal.   Mouth/Throat: Oropharynx is clear and moist. No oropharyngeal exudate.   Eyes: Pupils are equal, round, and reactive to light. Conjunctivae and EOM are normal. No scleral icterus.   Neck: Normal range of motion. Neck supple. No JVD present. No tracheal deviation present. No thyromegaly present.   Cardiovascular: Normal rate, regular rhythm and intact distal pulses. Exam reveals no gallop and no friction rub.   No murmur heard.  Pulmonary/Chest: Effort normal. No stridor. No respiratory distress. She has no wheezes. She has no rales. She exhibits no tenderness.   Abdominal: Soft. Bowel sounds are normal. She exhibits no distension and no mass. There is no tenderness. There is no rebound and no guarding.   Musculoskeletal: Normal range of motion. She exhibits no edema.   Lymphadenopathy:     She has no cervical adenopathy.   Neurological: She is alert and oriented to person, place, and time. No cranial nerve deficit. Gait normal. Coordination normal.   Skin: Skin is warm and dry. No rash noted. She is not diaphoretic. No erythema. No pallor.   Psychiatric: Mood and affect normal.     Labs:  Lab Visit on 05/13/2019   Component Date Value    WBC 05/13/2019 3.48*    RBC 05/13/2019 4.02     Hemoglobin 05/13/2019 10.7*    Hematocrit 05/13/2019 34.1*    Mean Corpuscular Volume 05/13/2019 85     Mean Corpuscular Hemoglo* " 05/13/2019 26.6*    Mean Corpuscular Hemoglo* 05/13/2019 31.4*    RDW 05/13/2019 13.1     Platelets 05/13/2019 289     MPV 05/13/2019 8.9*    Immature Granulocytes 05/13/2019 0.6*    Gran # (ANC) 05/13/2019 2.0     Immature Grans (Abs) 05/13/2019 0.02     Lymph # 05/13/2019 0.8*    Mono # 05/13/2019 0.4     Eos # 05/13/2019 0.3     Baso # 05/13/2019 0.05     nRBC 05/13/2019 0     Gran% 05/13/2019 56.0     Lymph% 05/13/2019 23.9     Mono% 05/13/2019 10.1     Eosinophil% 05/13/2019 8.0     Basophil% 05/13/2019 1.4     Differential Method 05/13/2019 Automated     Sodium 05/13/2019 138     Potassium 05/13/2019 4.1     Chloride 05/13/2019 103     CO2 05/13/2019 26     Glucose 05/13/2019 212*    BUN, Bld 05/13/2019 12     Creatinine 05/13/2019 0.8     Calcium 05/13/2019 9.4     Total Protein 05/13/2019 7.1     Albumin 05/13/2019 3.4*    Total Bilirubin 05/13/2019 0.4     Alkaline Phosphatase 05/13/2019 140*    AST 05/13/2019 76*    ALT 05/13/2019 73*    Anion Gap 05/13/2019 9     eGFR if African American 05/13/2019 >60.0     eGFR if non African Amer* 05/13/2019 >60.0    Lab Visit on 05/07/2019   Component Date Value    WBC 05/07/2019 4.19     RBC 05/07/2019 4.28     Hemoglobin 05/07/2019 11.5*    Hematocrit 05/07/2019 36.6*    Mean Corpuscular Volume 05/07/2019 86     Mean Corpuscular Hemoglo* 05/07/2019 26.9*    Mean Corpuscular Hemoglo* 05/07/2019 31.4*    RDW 05/07/2019 13.3     Platelets 05/07/2019 312     MPV 05/07/2019 9.6     Immature Granulocytes 05/07/2019 0.2     Gran # (ANC) 05/07/2019 2.7     Immature Grans (Abs) 05/07/2019 0.01     Lymph # 05/07/2019 0.9*    Mono # 05/07/2019 0.4     Eos # 05/07/2019 0.1     Baso # 05/07/2019 0.04     nRBC 05/07/2019 0     Gran% 05/07/2019 63.5     Lymph% 05/07/2019 22.4     Mono% 05/07/2019 10.3     Eosinophil% 05/07/2019 2.6     Basophil% 05/07/2019 1.0     Differential Method 05/07/2019 Automated     Sodium  05/07/2019 136     Potassium 05/07/2019 3.9     Chloride 05/07/2019 103     CO2 05/07/2019 22*    Glucose 05/07/2019 104     BUN, Bld 05/07/2019 14     Creatinine 05/07/2019 0.7     Calcium 05/07/2019 9.6     Total Protein 05/07/2019 7.2     Albumin 05/07/2019 3.9     Total Bilirubin 05/07/2019 0.3     Alkaline Phosphatase 05/07/2019 126     AST 05/07/2019 72*    ALT 05/07/2019 61*    Anion Gap 05/07/2019 11     eGFR if African American 05/07/2019 >60.0     eGFR if non African Amer* 05/07/2019 >60.0        Pulmonary Function Tests 5/13/2019 3/21/2019 9/11/2018 6/12/2018 3/13/2018 8/31/2016 8/5/2015   FVC 3.13 3.03 2.95 3.09 3.07 3.15 3.29   FEV1 2.05 2 2.02 2.04 2.01 2.09 2.14   FVC% 105 101 98 103 98 94 98   FEV1% 81 78 79 80 77 76 77   FEF 25-75 1.01 1.05 1.16 1.11 1.08 1.13 98   FEF 25-75% 34 36 39 38 36 38 32     No flowsheet data found.    Assessment:-  1. Cystic fibrosis with pulmonary manifestations    2. Elevated LFTs    3. Dyspepsia      Plan:   1. Will finish antibiotics today, she has responded well. I advised her it use her dornase, hypersal, GAMA and percussion vest and recommended the following order: Levalbuterol-->wait 5-10 minutes-->dornase--> hypertonic saline-->GAMA. She says she will start by doing dornase and hypertonic saline. CFTR analysis drawn today.    2. Will repeat LFT's in two weeks, I explained that CF patients are prone to LFT elevations while on antibiotics.     3. I advised her to consult her Gastroenterologist if her symptoms do not improve after stopping antibiotics.    4. RTC in 3 months or earlier if needed.

## 2019-05-17 ENCOUNTER — TELEPHONE (OUTPATIENT)
Dept: TRANSPLANT | Facility: CLINIC | Age: 44
End: 2019-05-17

## 2019-05-17 ENCOUNTER — PATIENT MESSAGE (OUTPATIENT)
Dept: TRANSPLANT | Facility: CLINIC | Age: 44
End: 2019-05-17

## 2019-05-18 ENCOUNTER — HOSPITAL ENCOUNTER (EMERGENCY)
Facility: HOSPITAL | Age: 44
Discharge: HOME | End: 2019-05-18
Attending: EMERGENCY MEDICINE
Payer: COMMERCIAL

## 2019-05-18 VITALS
OXYGEN SATURATION: 99 % | HEART RATE: 82 BPM | WEIGHT: 116 LBS | HEIGHT: 61 IN | RESPIRATION RATE: 16 BRPM | BODY MASS INDEX: 21.9 KG/M2 | DIASTOLIC BLOOD PRESSURE: 79 MMHG | SYSTOLIC BLOOD PRESSURE: 142 MMHG | TEMPERATURE: 99 F

## 2019-05-18 DIAGNOSIS — R53.83 MALAISE AND FATIGUE: ICD-10-CM

## 2019-05-18 DIAGNOSIS — R53.81 MALAISE AND FATIGUE: ICD-10-CM

## 2019-05-18 LAB
ALBUMIN SERPL BCP-MCNC: 3.5 G/DL (ref 3.5–5.2)
ALP SERPL-CCNC: 138 U/L (ref 55–135)
ALT SERPL W/O P-5'-P-CCNC: 34 U/L (ref 10–44)
ANION GAP SERPL CALC-SCNC: 8 MMOL/L (ref 8–16)
AST SERPL-CCNC: 23 U/L (ref 10–40)
BASOPHILS # BLD AUTO: 0.08 K/UL (ref 0–0.2)
BASOPHILS NFR BLD: 1.6 % (ref 0–1.9)
BILIRUB SERPL-MCNC: 0.3 MG/DL (ref 0.1–1)
BUN SERPL-MCNC: 12 MG/DL (ref 6–20)
CALCIUM SERPL-MCNC: 9.8 MG/DL (ref 8.7–10.5)
CHLORIDE SERPL-SCNC: 105 MMOL/L (ref 95–110)
CO2 SERPL-SCNC: 26 MMOL/L (ref 23–29)
CREAT SERPL-MCNC: 0.7 MG/DL (ref 0.5–1.4)
D DIMER PPP IA.FEU-MCNC: 0.45 MG/L FEU
DIFFERENTIAL METHOD: ABNORMAL
EOSINOPHIL # BLD AUTO: 0.2 K/UL (ref 0–0.5)
EOSINOPHIL NFR BLD: 3.8 % (ref 0–8)
ERYTHROCYTE [DISTWIDTH] IN BLOOD BY AUTOMATED COUNT: 12.4 % (ref 11.5–14.5)
EST. GFR  (AFRICAN AMERICAN): >60 ML/MIN/1.73 M^2
EST. GFR  (NON AFRICAN AMERICAN): >60 ML/MIN/1.73 M^2
GLUCOSE SERPL-MCNC: 128 MG/DL (ref 70–110)
HCT VFR BLD AUTO: 32.7 % (ref 37–48.5)
HGB BLD-MCNC: 10.6 G/DL (ref 12–16)
IMM GRANULOCYTES # BLD AUTO: 0.02 K/UL (ref 0–0.04)
IMM GRANULOCYTES NFR BLD AUTO: 0.4 % (ref 0–0.5)
IRON SERPL-MCNC: 33 UG/DL (ref 30–160)
LYMPHOCYTES # BLD AUTO: 1.5 K/UL (ref 1–4.8)
LYMPHOCYTES NFR BLD: 28.7 % (ref 18–48)
MCH RBC QN AUTO: 27 PG (ref 27–31)
MCHC RBC AUTO-ENTMCNC: 32.4 G/DL (ref 32–36)
MCV RBC AUTO: 83 FL (ref 82–98)
MONOCYTES # BLD AUTO: 0.3 K/UL (ref 0.3–1)
MONOCYTES NFR BLD: 6.7 % (ref 4–15)
NEUTROPHILS # BLD AUTO: 3 K/UL (ref 1.8–7.7)
NEUTROPHILS NFR BLD: 58.8 % (ref 38–73)
NRBC BLD-RTO: 0 /100 WBC
PLATELET # BLD AUTO: 382 K/UL (ref 150–350)
PMV BLD AUTO: 9 FL (ref 9.2–12.9)
POTASSIUM SERPL-SCNC: 4.5 MMOL/L (ref 3.5–5.1)
PROT SERPL-MCNC: 7.4 G/DL (ref 6–8.4)
RBC # BLD AUTO: 3.93 M/UL (ref 4–5.4)
SATURATED IRON: 7 % (ref 20–50)
SODIUM SERPL-SCNC: 139 MMOL/L (ref 136–145)
TOTAL IRON BINDING CAPACITY: 481 UG/DL (ref 250–450)
TRANSFERRIN SERPL-MCNC: 325 MG/DL (ref 200–375)
WBC # BLD AUTO: 5.06 K/UL (ref 3.9–12.7)

## 2019-05-18 PROCEDURE — 80053 COMPREHEN METABOLIC PANEL: CPT

## 2019-05-18 PROCEDURE — 99283 EMERGENCY DEPT VISIT LOW MDM: CPT

## 2019-05-18 PROCEDURE — 99284 PR EMERGENCY DEPT VISIT,LEVEL IV: ICD-10-PCS | Mod: ,,, | Performed by: EMERGENCY MEDICINE

## 2019-05-18 PROCEDURE — 83540 ASSAY OF IRON: CPT

## 2019-05-18 PROCEDURE — 99284 EMERGENCY DEPT VISIT MOD MDM: CPT | Mod: ,,, | Performed by: EMERGENCY MEDICINE

## 2019-05-18 PROCEDURE — 85379 FIBRIN DEGRADATION QUANT: CPT

## 2019-05-18 PROCEDURE — 85025 COMPLETE CBC W/AUTO DIFF WBC: CPT

## 2019-05-18 NOTE — ED TRIAGE NOTES
Patient reports to the ED today with reports of L arm numbness and tingling. Patient states recent midline removal and states that now her upper arm is numb. Patient also endorses L leg numbness.

## 2019-05-18 NOTE — ED PROVIDER NOTES
Encounter Date: 2019    SCRIBE #1 NOTE: I, Saul Gill, am scribing for, and in the presence of,  Betsy Lucas MD. I have scribed the following portions of the note - Other sections scribed: HPI, ROS, PE, Chart Review.       History     Chief Complaint   Patient presents with    Arm Problem     had midline iv dc'd tues want to see if have blood clot in L arm,     Leg Problem     L leg numbness since tuesday, muscle in my L butt hurts     44 year old woman with a past medical history of cystic fibrosis, diabetes mellitus, and psoriasis presents to the ED with multiple complaints, includin) intermittent numbness, weakness, and tingling in her left arm since her midline was removed 4 days ago, located to the medial side of her left forearm and extending into her hand, 2) numbness and weakness in her left leg since 4 days ago and 3) generalized fatigue and light headedness since she finished her 2 week course of Meropenum. The patient states that she has also been having issues with her sugar since getting off of the Meropenum, and that her blood glucose levels don't fluctuate as much when she does and doesn't eat when not taking her insulin. The main reason for her presenting to the ED is because she read online about symptoms of blood clots, and she is scared that she has one after the removal of her midline.     The history is provided by the patient.     Review of patient's allergies indicates:   Allergen Reactions    Milk containing products Diarrhea    Penicillin     [place]      Past Medical History:   Diagnosis Date    Abnormal Pap smear of vagina     Bilateral carpal tunnel syndrome     Cystic fibrosis     Cystic fibrosis     Diabetes mellitus     CFRD    Psoriasis      Past Surgical History:   Procedure Laterality Date    ADENOIDECTOMY      COLONOSCOPY N/A 2018    Performed by [facility]    ESOPHAGOGASTRODUODENOSCOPY (EGD) N/A 2018     [facility]    SINUS SURGERY       Family History   Problem Relation Age of Onset    Heart disease Father     Diabetes Mother     Breast cancer Maternal Grandmother     Colon cancer Neg Hx     Ovarian cancer Neg Hx     Celiac disease Neg Hx     Crohn's disease Neg Hx     Esophageal cancer Neg Hx     Inflammatory bowel disease Neg Hx     Irritable bowel syndrome Neg Hx     Liver cancer Neg Hx     Rectal cancer Neg Hx     Stomach cancer Neg Hx     Ulcerative colitis Neg Hx      Social History     Tobacco Use    Smoking status: Never Smoker    Smokeless tobacco: Never Used   Substance Use Topics    Alcohol use: Yes     Alcohol/week: 1.2 oz     Types: 2 Glasses of wine per week     Comment: 1-2 drinks a week     Drug use: No     Review of Systems   Constitutional: Positive for fatigue. Negative for fever.   HENT: Negative for sore throat.    Respiratory: Negative for shortness of breath.    Cardiovascular: Negative for chest pain.   Gastrointestinal: Negative for nausea.   Genitourinary: Negative for dysuria.   Musculoskeletal: Negative for back pain.   Skin: Negative for rash.   Neurological: Positive for weakness, light-headedness and numbness.   Hematological: Does not bruise/bleed easily.       Physical Exam     Initial Vitals [05/18/19 1218]   BP Pulse Resp Temp SpO2   113/81 86 18 98.6 °F (37 °C) 99 %      MAP       --         Physical Exam    Nursing note and vitals reviewed.  Constitutional: She appears well-developed and well-nourished. No distress.   HENT:   Head: Normocephalic and atraumatic.   Right Ear: External ear normal.   Left Ear: External ear normal.   Nose: Nose normal.   Mouth/Throat: Oropharynx is clear and moist.   Eyes: EOM are normal. Pupils are equal, round, and reactive to light.   Neck: Normal range of motion. Neck supple.   Cardiovascular: Normal rate, regular rhythm and normal heart sounds. Exam reveals no gallop and no friction rub.     No murmur heard.  Pulmonary/Chest: Breath sounds normal. No respiratory distress. She has no wheezes. She has no rhonchi. She has no rales.   Abdominal: Soft. She exhibits no distension. There is no tenderness.   Musculoskeletal: Normal range of motion.   LUE: scarring present on medial side of left upper arm at the site of previous midline catheter insertion. No tenderness or swelling in this area. No palpable cord in upper arms. No tenderness extending towards the shoulder. No tenderness extending towards the shoulder. No tenderness or swelling of the forearm. Radial pulse is 2+ normal cap refill.  No edema or erythema of her lower legs.   DP pulses 2+ bilaterally.   Neurological: She is alert and oriented to person, place, and time. She has normal strength. No cranial nerve deficit or sensory deficit.   Normal  strength. Sensation intact to light touch. Full strength bilateral lower extremities, sensation intact to light touch.   Skin: Skin is warm and dry.   Psychiatric: Her behavior is normal. Thought content normal.         ED Course   Procedures  Labs Reviewed   COMPREHENSIVE METABOLIC PANEL - Abnormal; Notable for the following components:       Result Value    Glucose 128 (*)     Alkaline Phosphatase 138 (*)     All other components within normal limits   CBC W/ AUTO DIFFERENTIAL - Abnormal; Notable for the following components:    RBC 3.93 (*)     Hemoglobin 10.6 (*)     Hematocrit 32.7 (*)     Platelets 382 (*)     MPV 9.0 (*)     All other components within normal limits   IRON AND TIBC - Abnormal; Notable for the following components:    TIBC 481 (*)     Saturated Iron 7 (*)     All other components within normal limits   D DIMER, QUANTITATIVE          Imaging Results    None          Medical Decision Making:   History:   Old Medical Records: I decided to obtain old medical records.  Old Records Summarized: other records.       <> Summary of Records: This patient just saw lung transplant on May  13, which was a follow up after completing 2 weeks of Meropenum for an exacerbation of her bronchiectasis. She has a history of cystic fibrosis.  Initial Assessment:   44 year old woman with cystic fibrosis complaining of left arm paraesthesias. She is concerned that she may have a DVT. Exam is not consistent with a DVT. Patient is low risk. Will check a D-dimer. I feel her symptoms are more consistent with neuropathic pain likely secondary to her midline catheter removal. Symptoms in LLE may be secondary to sciatica. She is neurologically intact and I have reassured her that I do not feel these symptoms are related to the LUE symptoms. In terms of her complaints of malaise and fatigue, given her history of anemia as well as recently abnormal LFT's, will check CBC, CMP, per patient request I will check iron studies which were recently low.   Clinical Tests:   Lab Tests: Ordered and Reviewed            Scribe Attestation:   Scribe #1: I performed the above scribed service and the documentation accurately describes the services I performed. I attest to the accuracy of the note.    Attending Attestation:           Physician Attestation for Scribe:      Comments: I, Dr. Betsy Lucas, personally performed the services described in this documentation. All medical record entries made by the scribe were at my direction and in my presence.  I have reviewed the chart and agree that the record reflects my personal performance and is accurate and complete. Betsy Lucas MD.       Attending ED Notes:    D-dimer is negative. CBC remarkable for anemia which is at her baseline. Chemistry shows improvement in her LFT's. I have reviewed all of these labs with the patient. She plans to follow up with her PCP.             Clinical Impression:       ICD-10-CM ICD-9-CM   1. Paresthesias/numbness R20.9 782.0   2. Malaise and fatigue R53.81 780.79    R53.83                                 Betsy Lucas MD  05/19/19  [id number]

## 2019-05-18 NOTE — ED NOTES
Patient identifiers verified and correct for Olinda Lombardi  LOC: The patient is awake, alert and aware of environment with an appropriate affect, the patient is oriented x 3 and speaking appropriately.   APPEARANCE: Patient appears comfortable and in no acute distress, patient is clean and well groomed.  SKIN: The skin is warm and dry, color consistent with ethnicity, patient has normal skin turgor and moist mucus membranes, skin intact, no breakdown or bruising noted.   MUSCULOSKELETAL: Patient moving all extremities spontaneously, no swelling noted.  RESPIRATORY: Airway is open and patent, respirations are spontaneous, patient has a normal effort and rate, no accessory muscle use noted, O2 sats noted at 99% on room air.  CARDIAC: Denies chest pain capillary refill < 3 seconds.   GASTRO: Soft and non tender to palpation, no distention noted, normoactive bowel sounds present in all four quadrants. Pt states bowel movements have been regular.  : Pt denies any pain or frequency with urination.  NEURO: Pt opens eyes spontaneously, behavior appropriate to situation, follows commands, facial expression symmetrical, bilateral hand grasp equal and even, purposeful motor response noted, normal sensation in all extremities when touched with a finger. Reports of numbness and tingling to L upper extremity

## 2019-05-20 NOTE — TELEPHONE ENCOUNTER
5/17 - Patient sent message with several complaints.  Reviewed complaints with Dr. Pate.  He would like for patient to contact her Endocrinologist regarding her blood sugars.  No new orders regarding arm discomfort at this time.  Responded to patient's message.

## 2019-05-22 ENCOUNTER — TELEPHONE (OUTPATIENT)
Dept: RHEUMATOLOGY | Facility: CLINIC | Age: 44
End: 2019-05-22

## 2019-05-25 ENCOUNTER — PATIENT MESSAGE (OUTPATIENT)
Dept: TRANSPLANT | Facility: CLINIC | Age: 44
End: 2019-05-25

## 2019-05-27 ENCOUNTER — HOSPITAL ENCOUNTER (OUTPATIENT)
Dept: PULMONOLOGY | Facility: CLINIC | Age: 44
Discharge: HOME | End: 2019-05-27
Payer: COMMERCIAL

## 2019-05-27 ENCOUNTER — HOSPITAL ENCOUNTER (OUTPATIENT)
Dept: RADIOLOGY | Facility: HOSPITAL | Age: 44
Discharge: HOME | End: 2019-05-27
Attending: INTERNAL MEDICINE
Payer: COMMERCIAL

## 2019-05-27 ENCOUNTER — LAB VISIT (OUTPATIENT)
Dept: LAB | Facility: HOSPITAL | Age: 44
End: 2019-05-27
Attending: INTERNAL MEDICINE
Payer: COMMERCIAL

## 2019-05-27 DIAGNOSIS — E84.9 CYSTIC FIBROSIS: ICD-10-CM

## 2019-05-27 LAB
PRE FEV1 FVC: 71
PRE FEV1: 2.03
PRE FVC: 2.87
PREDICTED FEV1 FVC: 85
PREDICTED FEV1: 2.54
PREDICTED FVC: 3

## 2019-05-27 PROCEDURE — 71046 XR CHEST PA AND LATERAL: ICD-10-PCS | Mod: 26,,, | Performed by: RADIOLOGY

## 2019-05-27 PROCEDURE — 87186 SC STD MICRODIL/AGAR DIL: CPT

## 2019-05-27 PROCEDURE — 94010 BREATHING CAPACITY TEST: CPT | Mod: S$GLB,,, | Performed by: INTERNAL MEDICINE

## 2019-05-27 PROCEDURE — 71046 X-RAY EXAM CHEST 2 VIEWS: CPT | Mod: TC,FY

## 2019-05-27 PROCEDURE — 94010 BREATHING CAPACITY TEST: ICD-10-PCS | Mod: S$GLB,,, | Performed by: INTERNAL MEDICINE

## 2019-05-27 PROCEDURE — 87070 CULTURE OTHR SPECIMN AEROBIC: CPT

## 2019-05-27 PROCEDURE — 71046 X-RAY EXAM CHEST 2 VIEWS: CPT | Mod: 26,,, | Performed by: RADIOLOGY

## 2019-05-27 PROCEDURE — 87077 CULTURE AEROBIC IDENTIFY: CPT

## 2019-05-27 PROCEDURE — 87205 SMEAR GRAM STAIN: CPT

## 2019-05-28 ENCOUNTER — PATIENT MESSAGE (OUTPATIENT)
Dept: TRANSPLANT | Facility: CLINIC | Age: 44
End: 2019-05-28

## 2019-05-31 ENCOUNTER — PATIENT MESSAGE (OUTPATIENT)
Dept: TRANSPLANT | Facility: CLINIC | Age: 44
End: 2019-05-31

## 2019-05-31 LAB
BACTERIA SPT CF RESP CULT: NORMAL
GRAM STN SPEC: NORMAL

## 2019-06-01 ENCOUNTER — PATIENT MESSAGE (OUTPATIENT)
Dept: TRANSPLANT | Facility: CLINIC | Age: 44
End: 2019-06-01

## 2019-06-03 ENCOUNTER — PATIENT MESSAGE (OUTPATIENT)
Dept: OBSTETRICS AND GYNECOLOGY | Facility: CLINIC | Age: 44
End: 2019-06-03

## 2019-06-03 ENCOUNTER — PATIENT MESSAGE (OUTPATIENT)
Dept: TRANSPLANT | Facility: CLINIC | Age: 44
End: 2019-06-03

## 2019-06-03 DIAGNOSIS — E84.9 CYSTIC FIBROSIS: Primary | ICD-10-CM

## 2019-06-03 RX ORDER — FLUCONAZOLE 150 MG/1
150 TABLET ORAL DAILY
Qty: 4 TABLET | Refills: 6 | Status: SHIPPED | OUTPATIENT
Start: 2019-06-03 | End: 2019-06-04

## 2019-06-04 ENCOUNTER — PATIENT MESSAGE (OUTPATIENT)
Dept: TRANSPLANT | Facility: CLINIC | Age: 44
End: 2019-06-04

## 2019-06-05 ENCOUNTER — OFFICE VISIT (OUTPATIENT)
Dept: TRANSPLANT | Facility: CLINIC | Age: 44
End: 2019-06-05
Payer: COMMERCIAL

## 2019-06-05 ENCOUNTER — TELEPHONE (OUTPATIENT)
Dept: TRANSPLANT | Facility: CLINIC | Age: 44
End: 2019-06-05

## 2019-06-05 ENCOUNTER — HOSPITAL ENCOUNTER (OUTPATIENT)
Dept: PULMONOLOGY | Facility: CLINIC | Age: 44
Discharge: HOME | End: 2019-06-05
Payer: COMMERCIAL

## 2019-06-05 VITALS
DIASTOLIC BLOOD PRESSURE: 81 MMHG | HEART RATE: 93 BPM | RESPIRATION RATE: 18 BRPM | BODY MASS INDEX: 22.09 KG/M2 | WEIGHT: 117 LBS | TEMPERATURE: 98 F | SYSTOLIC BLOOD PRESSURE: 115 MMHG | HEIGHT: 61 IN | OXYGEN SATURATION: 99 %

## 2019-06-05 DIAGNOSIS — J32.9 CHRONIC SINUSITIS, UNSPECIFIED LOCATION: ICD-10-CM

## 2019-06-05 DIAGNOSIS — E84.9 CYSTIC FIBROSIS: ICD-10-CM

## 2019-06-05 DIAGNOSIS — E84.9 CYSTIC FIBROSIS: Primary | ICD-10-CM

## 2019-06-05 LAB
PRE FEV1 FVC: 69
PRE FEV1: 2.15
PRE FVC: 3.12
PREDICTED FEV1 FVC: 85
PREDICTED FEV1: 2.54
PREDICTED FVC: 3

## 2019-06-05 PROCEDURE — 94010 BREATHING CAPACITY TEST: CPT | Mod: S$GLB,,, | Performed by: INTERNAL MEDICINE

## 2019-06-05 PROCEDURE — 3008F PR BODY MASS INDEX (BMI) DOCUMENTED: ICD-10-PCS | Mod: CPTII,S$GLB,, | Performed by: INTERNAL MEDICINE

## 2019-06-05 PROCEDURE — 99999 PR PBB SHADOW E&M-EST. PATIENT-LVL III: ICD-10-PCS | Mod: PBBFAC,,, | Performed by: INTERNAL MEDICINE

## 2019-06-05 PROCEDURE — 99999 PR PBB SHADOW E&M-EST. PATIENT-LVL III: CPT | Mod: PBBFAC,,, | Performed by: INTERNAL MEDICINE

## 2019-06-05 PROCEDURE — 99213 PR OFFICE/OUTPT VISIT, EST, LEVL III, 20-29 MIN: ICD-10-PCS | Mod: 25,S$GLB,, | Performed by: INTERNAL MEDICINE

## 2019-06-05 PROCEDURE — 99213 OFFICE O/P EST LOW 20 MIN: CPT | Mod: 25,S$GLB,, | Performed by: INTERNAL MEDICINE

## 2019-06-05 PROCEDURE — 3008F BODY MASS INDEX DOCD: CPT | Mod: CPTII,S$GLB,, | Performed by: INTERNAL MEDICINE

## 2019-06-05 PROCEDURE — 94010 BREATHING CAPACITY TEST: ICD-10-PCS | Mod: S$GLB,,, | Performed by: INTERNAL MEDICINE

## 2019-06-05 RX ORDER — FLUTICASONE PROPIONATE 50 MCG
2 SPRAY, SUSPENSION (ML) NASAL DAILY
Qty: 1 BOTTLE | Refills: 3 | Status: SHIPPED | OUTPATIENT
Start: 2019-06-05 | End: 2022-05-05

## 2019-06-05 NOTE — TELEPHONE ENCOUNTER
Contacted pt to schedule a ct of the sinuses that was ordered by Dr. Pate. Pt stated that Thursdays work best for her. Pt wanted to know if the ct ordered is with contrast. I advised that pt that it wasn't, but she advised me that she had a scan of some sort of her kidney and bladder with contrast. I advised her that it may have not been the same test.  Pt stated that when she was 9 or 10 years old that she had a ct of the sinuses, but can't remember if contrast was involved. Nurse calvo verified with  that the order placed was not with contrast. I provided the pt with this info and advised her that contrast will be at the radiologist's discretion during the day of ct scan. Pt was uncertain she wanted the ct scan done because she stated that she is certain that contrast will be used and that it will not be safe. She stated that she would need at least a week to think about if she would like to have the ct of her sinuses performed. I advised pt to give us a call back or send me a message through the portal when she has reached her decision. Pt agreed and expressed understanding. No further concerns. Ended call.

## 2019-06-06 NOTE — PROGRESS NOTES
"CYSTIC FIBROSIS FOLLOW-UP                                                                                               History of Present Illness: Olinda Lombardi is a 44 y.o. female who is on 0L of oxygen. She is on no assisted ventilation.  Her New York Heart Association Class is II and a Karnofsky score of 90% - Able to carry on normal activity: minor symptoms of disease. She is not diabetic. She presents to clinic today because of subjective increase in cough, chest tightness, and shortness of breath. Denies fevers. She feels that she needs prednisone. She also reports having post-nasal drip with drainage into her lungs, had sinus surgery at the age of 9.    Review of Systems   Constitutional: Negative for chills, diaphoresis, fever, malaise/fatigue and weight loss.   HENT: Negative for congestion, ear discharge, ear pain, hearing loss, nosebleeds and sore throat.    Eyes: Negative for blurred vision, double vision and photophobia.   Respiratory: Positive for cough, sputum production (light yellow), shortness of breath and wheezing. Negative for hemoptysis.    Cardiovascular: Negative for chest pain, palpitations, orthopnea, claudication, leg swelling and PND.   Gastrointestinal: Negative for abdominal pain, blood in stool, constipation, diarrhea, heartburn, melena, nausea and vomiting.   Genitourinary: Negative for dysuria, flank pain, frequency, hematuria and urgency.   Musculoskeletal: Negative for back pain, falls, joint pain, myalgias and neck pain.   Skin: Negative for itching and rash.   Neurological: Negative for dizziness, tremors, sensory change, loss of consciousness, weakness and headaches.   Endo/Heme/Allergies: Does not bruise/bleed easily.   Psychiatric/Behavioral: Negative for depression, hallucinations and memory loss. The patient is not nervous/anxious and does not have insomnia.      Objective:   /81   Pulse 93   Temp 97.7 °F (36.5 °C) (Oral)   Resp 18   Ht 5' 1" (1.549 m)   " Wt 53.1 kg (117 lb)   SpO2 99%   BMI 22.11 kg/m²      Physical Exam   Constitutional: She is oriented to person, place, and time and well-developed, well-nourished, and in no distress. No distress.   HENT:   Head: Normocephalic and atraumatic.   Nose: Nose normal.   Mouth/Throat: Oropharynx is clear and moist. No oropharyngeal exudate.   Eyes: Pupils are equal, round, and reactive to light. Conjunctivae and EOM are normal. No scleral icterus.   Neck: Normal range of motion. Neck supple. No JVD present. No tracheal deviation present. No thyromegaly present.   Cardiovascular: Normal rate, regular rhythm and intact distal pulses. Exam reveals no gallop and no friction rub.   No murmur heard.  Pulmonary/Chest: Effort normal. No stridor. No respiratory distress. She has no wheezes. She has no rales. She exhibits no tenderness.   Abdominal: Soft. Bowel sounds are normal. She exhibits no distension and no mass. There is no tenderness. There is no rebound and no guarding.   Musculoskeletal: Normal range of motion. She exhibits no edema.   Lymphadenopathy:     She has no cervical adenopathy.   Neurological: She is alert and oriented to person, place, and time. No cranial nerve deficit. Gait normal. Coordination normal.   Skin: Skin is warm and dry. No rash noted. She is not diaphoretic. No erythema. No pallor.   Psychiatric: Mood and affect normal.     Labs:  No visits with results within 7 Day(s) from this visit.   Latest known visit with results is:   Lab Visit on 05/27/2019   Component Date Value    RESPIRATORY CULTURE - CY* 05/27/2019                      Value:PSEUDOMONAS AERUGINOSA  Moderate  Normal respiratory demetrio also present      Gram Stain (Respiratory) 05/27/2019 Rare WBC's     Gram Stain (Respiratory) 05/27/2019 Moderate Gram positive cocci     Gram Stain (Respiratory) 05/27/2019 Many Gram negative rods        Pulmonary Function Tests 6/5/2019 5/27/2019 5/13/2019 3/21/2019 9/11/2018 6/12/2018 3/13/2018    FVC 3.12 2.87 3.13 3.03 2.95 3.09 3.07   FEV1 2.15 2.03 2.05 2 2.02 2.04 2.01   FVC% 104 96 105 101 98 103 98   FEV1% 85 80 81 78 79 80 77   FEF 25-75 - - 1.01 1.05 1.16 1.11 1.08   FEF 25-75% - - 34 36 39 38 36     No flowsheet data found.    Assessment:-  1. Cystic fibrosis    2. Chronic sinusitis, unspecified location      Plan:   1. There is no evidence of and exacerbation of her bronchiectasis. No need for antibiotics or corticosteroids. I advised her it use her dornase, hypersal, GAMA and percussion vest and recommended the following order: Levalbuterol-->wait 5-10 minutes-->dornase--> hypertonic saline-->GAMA. She says she will start by doing dornase and hypertonic saline. CFTR analysis pending.    2. CT of sinuses today and start nasal fluticasone. Referral to ENT depending on results of CT.     3. RTC in 3 months or earlier if needed.

## 2019-07-02 DIAGNOSIS — E84.8 DIABETES MELLITUS RELATED TO CYSTIC FIBROSIS: Primary | ICD-10-CM

## 2019-07-02 DIAGNOSIS — E08.9 DIABETES MELLITUS RELATED TO CYSTIC FIBROSIS: Primary | ICD-10-CM

## 2019-07-03 RX ORDER — INSULIN ASPART INJECTION 100 [IU]/ML
INJECTION, SOLUTION SUBCUTANEOUS
Qty: 15 ML | Refills: 1 | Status: SHIPPED | OUTPATIENT
Start: 2019-07-03 | End: 2020-04-23 | Stop reason: SDUPTHER

## 2019-07-18 ENCOUNTER — PATIENT MESSAGE (OUTPATIENT)
Dept: GASTROENTEROLOGY | Facility: CLINIC | Age: 44
End: 2019-07-18

## 2019-07-18 ENCOUNTER — OFFICE VISIT (OUTPATIENT)
Dept: URGENT CARE | Facility: CLINIC | Age: 44
End: 2019-07-18
Payer: COMMERCIAL

## 2019-07-18 VITALS
BODY MASS INDEX: 22.1 KG/M2 | WEIGHT: 117.06 LBS | TEMPERATURE: 99 F | HEART RATE: 90 BPM | DIASTOLIC BLOOD PRESSURE: 77 MMHG | OXYGEN SATURATION: 100 % | RESPIRATION RATE: 19 BRPM | SYSTOLIC BLOOD PRESSURE: 117 MMHG | HEIGHT: 61 IN

## 2019-07-18 DIAGNOSIS — R42 LIGHTHEADEDNESS: Primary | ICD-10-CM

## 2019-07-18 DIAGNOSIS — R10.9 ABDOMINAL PAIN, UNSPECIFIED ABDOMINAL LOCATION: Primary | ICD-10-CM

## 2019-07-18 LAB
B-HCG UR QL: NEGATIVE
BILIRUB UR QL STRIP: NEGATIVE
CTP QC/QA: YES
GLUCOSE SERPL-MCNC: 143 MG/DL (ref 70–110)
GLUCOSE UR QL STRIP: NEGATIVE
KETONES UR QL STRIP: NEGATIVE
LEUKOCYTE ESTERASE UR QL STRIP: NEGATIVE
PH, POC UA: 7.5 (ref 5–8)
POC BLOOD, URINE: NEGATIVE
POC NITRATES, URINE: NEGATIVE
PROT UR QL STRIP: NEGATIVE
SP GR UR STRIP: 1 (ref 1–1.03)
UROBILINOGEN UR STRIP-ACNC: NORMAL (ref 0.1–1.1)

## 2019-07-18 PROCEDURE — 3008F PR BODY MASS INDEX (BMI) DOCUMENTED: ICD-10-PCS | Mod: CPTII,S$GLB,, | Performed by: NURSE PRACTITIONER

## 2019-07-18 PROCEDURE — 81025 URINE PREGNANCY TEST: CPT | Mod: S$GLB,,, | Performed by: NURSE PRACTITIONER

## 2019-07-18 PROCEDURE — 93010 ELECTROCARDIOGRAM REPORT: CPT | Mod: S$GLB,,, | Performed by: INTERNAL MEDICINE

## 2019-07-18 PROCEDURE — 99214 PR OFFICE/OUTPT VISIT, EST, LEVL IV, 30-39 MIN: ICD-10-PCS | Mod: S$GLB,,, | Performed by: NURSE PRACTITIONER

## 2019-07-18 PROCEDURE — 99214 OFFICE O/P EST MOD 30 MIN: CPT | Mod: S$GLB,,, | Performed by: NURSE PRACTITIONER

## 2019-07-18 PROCEDURE — 93010 EKG 12-LEAD: ICD-10-PCS | Mod: S$GLB,,, | Performed by: INTERNAL MEDICINE

## 2019-07-18 PROCEDURE — 81025 POCT URINE PREGNANCY: ICD-10-PCS | Mod: S$GLB,,, | Performed by: NURSE PRACTITIONER

## 2019-07-18 PROCEDURE — 82962 GLUCOSE BLOOD TEST: CPT | Mod: S$GLB,,, | Performed by: NURSE PRACTITIONER

## 2019-07-18 PROCEDURE — 93005 ELECTROCARDIOGRAM TRACING: CPT | Mod: S$GLB,,, | Performed by: NURSE PRACTITIONER

## 2019-07-18 PROCEDURE — 3008F BODY MASS INDEX DOCD: CPT | Mod: CPTII,S$GLB,, | Performed by: NURSE PRACTITIONER

## 2019-07-18 PROCEDURE — 81003 URINALYSIS AUTO W/O SCOPE: CPT | Mod: QW,S$GLB,, | Performed by: NURSE PRACTITIONER

## 2019-07-18 PROCEDURE — 82962 POCT GLUCOSE, HAND-HELD DEVICE: ICD-10-PCS | Mod: S$GLB,,, | Performed by: NURSE PRACTITIONER

## 2019-07-18 PROCEDURE — 81003 POCT URINALYSIS, DIPSTICK, AUTOMATED, W/O SCOPE: ICD-10-PCS | Mod: QW,S$GLB,, | Performed by: NURSE PRACTITIONER

## 2019-07-18 PROCEDURE — 93005 EKG 12-LEAD: ICD-10-PCS | Mod: S$GLB,,, | Performed by: NURSE PRACTITIONER

## 2019-07-18 NOTE — PROGRESS NOTES
"Subjective:       Patient ID: Olinda Lombardi is a 44 y.o. female.    Vitals:  height is 5' 1" (1.549 m) and weight is 53.1 kg (117 lb 1 oz). Her oral temperature is 98.5 °F (36.9 °C). Her blood pressure is 119/74 and her pulse is 82. Her respiration is 19 and oxygen saturation is 100%.     Chief Complaint: Dizziness    44-year-old female presents with cystic fibrosis, diabetes, and anxiety presents to the urgent care with complaints of lightheadedness x1 day.  Patient reported taking Zyrtec for symptoms. No relief noted.    Dizziness:   Chronicity:  New  Onset:  Yesterday  Progression since onset:  Unchanged  Frequency:  Constantly  Severity:  Moderate  Dizziness characteristics:  Lightheaded/impending faint and trouble focusing eyes   Associated symptoms: light-headedness.no hearing loss, no ear congestion, no ear pain, no fever, no headaches, no tinnitus, no nausea, no vomiting, no diaphoresis, no aural fullness, no weakness, no visual disturbances, no syncope, no palpitations, no panic, no facial weakness, no slurred speech, no numbness in extremities and no chest pain.   Sinus pressure  Aggravated by:  Nothing  Treatments tried: Zyrtec.  Improvements on treatment:  No relief      Constitution: Negative for chills, sweating, fatigue and fever.   HENT: Negative for ear pain, tinnitus, hearing loss, congestion and sore throat.    Neck: Negative for painful lymph nodes.   Cardiovascular: Negative for chest pain, leg swelling, palpitations and passing out.   Eyes: Negative for double vision and blurred vision.   Respiratory: Negative for cough and shortness of breath.    Gastrointestinal: Negative for nausea, vomiting and diarrhea.   Genitourinary: Negative for dysuria, frequency, urgency and history of kidney stones.   Musculoskeletal: Negative for joint pain, joint swelling, muscle cramps and muscle ache.   Skin: Negative for color change, pale, rash and bruising.   Allergic/Immunologic: Negative for seasonal " allergies.   Neurological: Positive for light-headedness. Negative for dizziness, history of vertigo, passing out and headaches.   Hematologic/Lymphatic: Negative for swollen lymph nodes.   Psychiatric/Behavioral: Negative for nervous/anxious, sleep disturbance and depression. The patient is not nervous/anxious.        Objective:      Physical Exam   Constitutional: She is oriented to person, place, and time. Vital signs are normal. She appears well-developed and well-nourished. She is active and cooperative.  Non-toxic appearance. She does not have a sickly appearance. She does not appear ill. No distress.   HENT:   Head: Normocephalic and atraumatic.   Right Ear: Hearing, tympanic membrane, external ear and ear canal normal.   Left Ear: Hearing, tympanic membrane, external ear and ear canal normal.   Nose: Nose normal.   Mouth/Throat: Uvula is midline, oropharynx is clear and moist and mucous membranes are normal.   Eyes: Pupils are equal, round, and reactive to light. Conjunctivae, EOM and lids are normal.   Neck: Trachea normal, normal range of motion, full passive range of motion without pain and phonation normal. Neck supple.   Cardiovascular: Normal rate, regular rhythm, normal heart sounds, intact distal pulses and normal pulses.   Pulses:       Radial pulses are 2+ on the right side, and 2+ on the left side.   Pulmonary/Chest: Effort normal and breath sounds normal.   Abdominal: Soft. Normal appearance and bowel sounds are normal. She exhibits no pulsatile midline mass. There is no tenderness. There is no rigidity, no rebound, no guarding, no CVA tenderness, no tenderness at McBurney's point and negative Thomason's sign.   Musculoskeletal: She exhibits no edema or deformity.   Neurological: She is alert and oriented to person, place, and time. She has normal strength and normal reflexes. No cranial nerve deficit or sensory deficit. She displays a negative Romberg sign.   -Sruthi Hallpike   Skin: Skin is warm,  dry and intact. Capillary refill takes less than 2 seconds. No rash noted. She is not diaphoretic.   Psychiatric: She has a normal mood and affect. Her speech is normal and behavior is normal. Judgment and thought content normal. Cognition and memory are normal.   Nursing note and vitals reviewed.      Assessment:       1. Lightheadedness        Results for orders placed or performed in visit on 07/18/19   POCT Urinalysis, Dipstick, Automated, W/O Scope   Result Value Ref Range    POC Blood, Urine Negative Negative    POC Bilirubin, Urine Negative Negative    POC Urobilinogen, Urine norm 0.1 - 1.1    POC Ketones, Urine Negative Negative    POC Protein, Urine Negative Negative    POC Nitrates, Urine Negative Negative    POC Glucose, Urine Negative Negative    pH, UA 7.5 5 - 8    POC Specific Gravity, Urine 1.005 1.003 - 1.029    POC Leukocytes, Urine Negative Negative   POCT urine pregnancy   Result Value Ref Range    POC Preg Test, Ur Negative Negative     Acceptable Yes    POCT Glucose, Hand-Held Device   Result Value Ref Range    POC Glucose 143 (A) 70 - 110 MG/DL       Plan:       Patient advised to follow-up primary care provider for further evaluation.  Patient showed no signs of acute distress at this time.  EKG noted to be negative. Patient requested for UPT for possible pregnancy and urinalysis to check for UTI although she had no symptoms.  UPT negative. UA negative. Breath sounds clear in all lung fields.  Patient no signs acute respiratory distress. Orthostatics negative. Patient have steady gait.  Equal strength. Clear speech. Patient advised to f/u with PCP for further evaluation.     Lightheadedness  -     Orthostatic vital signs  -     IN OFFICE EKG 12-LEAD (to Muse)  -     POCT Urinalysis, Dipstick, Automated, W/O Scope  -     POCT urine pregnancy  -     POCT Glucose, Hand-Held Device      Patient Instructions   If you were prescribed a narcotic or controlled medication, do not drive  or operate heavy equipment or machinery while taking these medications.  You must understand that you've received an Urgent Care treatment only and that you may be released before all your medical problems are known or treated. You, the patient, will arrange for follow up care as instructed.  Follow up with your PCP or specialty clinic as directed within 2-5 days if not improved or as needed.  You can call [phone] to schedule an appointment with the appropriate provider.  If your condition worsens we recommend that you receive another evaluation at the emergency room immediately or contact your primary medical clinics after hours call service to discuss your concerns.  Please return here or go to the Emergency Department for any concerns or worsening of condition.      Dizziness (Uncertain Cause)  Dizziness is a common symptom. It may be described as lightheadedness, spinning, or feeling like you are going to faint. Dizziness can have many causes.  Be sure to tell the healthcare provider about:  · All medicines you take, including prescription, over-the-counter, herbs, and supplements  · Any other symptoms you have  · Any health problems you are being treated for  · Anything that causes the dizziness to get worse or better  Today's exam did not show an exact cause for your dizziness. Other tests may be needed. Follow up with your healthcare provider.  Home care  · Dizziness that occurs with sudden standing may be a sign of mild dehydration. Drink extra fluids for the next few days.  · If you recently started a new medicine, stopped a medicine, or had the dose of a current medicine changed, talk with the prescribing healthcare provider. Your medicine plan may need adjustment.  · If dizziness lasts more than a few seconds, sit or lie down until it passes. This may help prevent injury in case you pass out.  · Do not drive or use power tools or dangerous equipment until you have had no dizziness for at least 48  hours.  Follow-up care  Follow up with your healthcare provider for further evaluation within the next 7 days or as advised.  When to seek medical advice  Call your healthcare provider for any of the following:  · Worsening of symptoms or new symptoms  · Passing out or seizure  · Repeated vomiting  · Headache  · Palpitations (the sense that your heart is fluttering or beating fast or hard)  · Shortness of breath  · Blood in vomit or stool (black or red color)  · Weakness of an arm or leg or one side of the face  · Vision or hearing changes  · Trouble walking or speaking  · Chest, arm, neck, back, or jaw pain  Date Last Reviewed: 8/23/2015 © 2000-2017 [employer]. [address] [place] [address], [place] [address]. All rights reserved. This information is not intended as a substitute for professional medical care. Always follow your healthcare professional's instructions.

## 2019-07-18 NOTE — PATIENT INSTRUCTIONS
If you were prescribed a narcotic or controlled medication, do not drive or operate heavy equipment or machinery while taking these medications.  You must understand that you've received an Urgent Care treatment only and that you may be released before all your medical problems are known or treated. You, the patient, will arrange for follow up care as instructed.  Follow up with your PCP or specialty clinic as directed within 2-5 days if not improved or as needed.  You can call [phone] to schedule an appointment with the appropriate provider.  If your condition worsens we recommend that you receive another evaluation at the emergency room immediately or contact your primary medical clinics after hours call service to discuss your concerns.  Please return here or go to the Emergency Department for any concerns or worsening of condition.      Dizziness (Uncertain Cause)  Dizziness is a common symptom. It may be described as lightheadedness, spinning, or feeling like you are going to faint. Dizziness can have many causes.  Be sure to tell the healthcare provider about:  · All medicines you take, including prescription, over-the-counter, herbs, and supplements  · Any other symptoms you have  · Any health problems you are being treated for  · Anything that causes the dizziness to get worse or better  Today's exam did not show an exact cause for your dizziness. Other tests may be needed. Follow up with your healthcare provider.  Home care  · Dizziness that occurs with sudden standing may be a sign of mild dehydration. Drink extra fluids for the next few days.  · If you recently started a new medicine, stopped a medicine, or had the dose of a current medicine changed, talk with the prescribing healthcare provider. Your medicine plan may need adjustment.  · If dizziness lasts more than a few seconds, sit or lie down until it passes. This may help prevent injury in case you pass out.  · Do not drive or use power tools  or dangerous equipment until you have had no dizziness for at least 48 hours.  Follow-up care  Follow up with your healthcare provider for further evaluation within the next 7 days or as advised.  When to seek medical advice  Call your healthcare provider for any of the following:  · Worsening of symptoms or new symptoms  · Passing out or seizure  · Repeated vomiting  · Headache  · Palpitations (the sense that your heart is fluttering or beating fast or hard)  · Shortness of breath  · Blood in vomit or stool (black or red color)  · Weakness of an arm or leg or one side of the face  · Vision or hearing changes  · Trouble walking or speaking  · Chest, arm, neck, back, or jaw pain  Date Last Reviewed: 8/23/2015  © 5746-7562 [employer]. [address] [place] [address], [place] [address]. All rights reserved. This information is not intended as a substitute for professional medical care. Always follow your healthcare professional's instructions.

## 2019-07-25 ENCOUNTER — PATIENT MESSAGE (OUTPATIENT)
Dept: GASTROENTEROLOGY | Facility: CLINIC | Age: 44
End: 2019-07-25

## 2019-07-30 DIAGNOSIS — E84.9 CYSTIC FIBROSIS: Primary | ICD-10-CM

## 2019-09-05 ENCOUNTER — HOSPITAL ENCOUNTER (OUTPATIENT)
Dept: RADIOLOGY | Facility: HOSPITAL | Age: 44
Discharge: HOME | End: 2019-09-05
Attending: INTERNAL MEDICINE
Payer: COMMERCIAL

## 2019-09-05 ENCOUNTER — OFFICE VISIT (OUTPATIENT)
Dept: RHEUMATOLOGY | Facility: CLINIC | Age: 44
End: 2019-09-05
Payer: COMMERCIAL

## 2019-09-05 VITALS
DIASTOLIC BLOOD PRESSURE: 78 MMHG | BODY MASS INDEX: 22.43 KG/M2 | WEIGHT: 118.81 LBS | HEART RATE: 83 BPM | HEIGHT: 61 IN | SYSTOLIC BLOOD PRESSURE: 116 MMHG

## 2019-09-05 DIAGNOSIS — L40.9 PSORIASIS: ICD-10-CM

## 2019-09-05 DIAGNOSIS — R53.83 FATIGUE, UNSPECIFIED TYPE: ICD-10-CM

## 2019-09-05 DIAGNOSIS — R52 BODY ACHES: ICD-10-CM

## 2019-09-05 DIAGNOSIS — L40.9 PSORIASIS: Primary | ICD-10-CM

## 2019-09-05 DIAGNOSIS — E84.9 CYSTIC FIBROSIS: ICD-10-CM

## 2019-09-05 PROCEDURE — 71046 X-RAY EXAM CHEST 2 VIEWS: CPT | Mod: 26,,, | Performed by: RADIOLOGY

## 2019-09-05 PROCEDURE — 99999 PR PBB SHADOW E&M-EST. PATIENT-LVL III: CPT | Mod: PBBFAC,,, | Performed by: INTERNAL MEDICINE

## 2019-09-05 PROCEDURE — 71046 XR CHEST PA AND LATERAL: ICD-10-PCS | Mod: 26,,, | Performed by: RADIOLOGY

## 2019-09-05 PROCEDURE — 3008F BODY MASS INDEX DOCD: CPT | Mod: CPTII,S$GLB,, | Performed by: INTERNAL MEDICINE

## 2019-09-05 PROCEDURE — 71046 X-RAY EXAM CHEST 2 VIEWS: CPT | Mod: TC

## 2019-09-05 PROCEDURE — 77077 XR ARTHRITIS SURVEY: ICD-10-PCS | Mod: 26,,, | Performed by: RADIOLOGY

## 2019-09-05 PROCEDURE — 77077 JOINT SURVEY SINGLE VIEW: CPT | Mod: TC

## 2019-09-05 PROCEDURE — 99999 PR PBB SHADOW E&M-EST. PATIENT-LVL III: ICD-10-PCS | Mod: PBBFAC,,, | Performed by: INTERNAL MEDICINE

## 2019-09-05 PROCEDURE — 3008F PR BODY MASS INDEX (BMI) DOCUMENTED: ICD-10-PCS | Mod: CPTII,S$GLB,, | Performed by: INTERNAL MEDICINE

## 2019-09-05 PROCEDURE — 77077 JOINT SURVEY SINGLE VIEW: CPT | Mod: 26,,, | Performed by: RADIOLOGY

## 2019-09-05 PROCEDURE — 99204 PR OFFICE/OUTPT VISIT, NEW, LEVL IV, 45-59 MIN: ICD-10-PCS | Mod: S$GLB,,, | Performed by: INTERNAL MEDICINE

## 2019-09-05 PROCEDURE — 99204 OFFICE O/P NEW MOD 45 MIN: CPT | Mod: S$GLB,,, | Performed by: INTERNAL MEDICINE

## 2019-09-05 ASSESSMENT — ROUTINE ASSESSMENT OF PATIENT INDEX DATA (RAPID3)
PSYCHOLOGICAL DISTRESS SCORE: 6.6
MDHAQ FUNCTION SCORE: 0
FATIGUE SCORE: 7.5
PATIENT GLOBAL ASSESSMENT SCORE: 5.5
AM STIFFNESS SCORE: 1, YES
PAIN SCORE: 2
TOTAL RAPID3 SCORE: 2.5

## 2019-09-05 NOTE — PROGRESS NOTES
"Subjective:       Patient ID: Olinda Lombardi is a 44 y.o. female.    Chief Complaint:     HPI:  Olinda Lombardi is a 44 y.o. female with Diabetes, ADHD, cystic fibrosis which has been mild and not required oxygen.  Bilateral shoulder and trapezius ache last episode one week ago.  Ibuprofen and Tylenol help.  Changed mattress which helped.  Mobic and chiropractor helped.    3/10 diffuse body aches.  Pain was 5-6/10 ache now today is a 1/10.  Rash on right elbow evaluated by outside derm who diagnosed psoriasis and gave ketaconazole.  Dermatologist ([facility]) recommended she see rheumatology.  Several years of morning stiffness that can last all day.      Review of Systems   Constitutional: Positive for fatigue.   HENT:        Dry lips but no dry mouth   Eyes: Negative.    Respiratory: Negative.    Cardiovascular: Negative.    Gastrointestinal:        Changes in bowel habits being evaluated by GI   Endocrine: Negative.    Genitourinary: Negative.    Musculoskeletal: Positive for arthralgias and myalgias.   Allergic/Immunologic: Negative.    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    Psychiatric/Behavioral: Negative.          Objective:   /78 (BP Location: Right arm, Patient Position: Sitting, BP Method: Medium (Automatic))   Pulse 83   Ht 5' 1" (1.549 m)   Wt 53.9 kg (118 lb 13.3 oz)   BMI 22.45 kg/m²      Physical Exam   Constitutional: She is oriented to person, place, and time and well-developed, well-nourished, and in no distress.   HENT:   Head: Normocephalic and atraumatic.   Eyes: Conjunctivae and EOM are normal.   Neck: Neck supple.   Cardiovascular: Normal rate, regular rhythm and normal heart sounds.    Pulmonary/Chest: Effort normal and breath sounds normal.   Abdominal: Soft. Bowel sounds are normal.   Neurological: She is alert and oriented to person, place, and time. Gait normal.   Skin: Skin is warm and dry.     Scaly " patch right forearm   Psychiatric: Mood and affect normal.   Musculoskeletal: She exhibits no edema, tenderness or deformity.            Assessment:       1.  Myalgias  2.  Psoriasis  3.  Cystic fibrosis  4.  ADHD   5.  Anxiety  6.  Fatigue  Plan:       1.  Labs  2.  X-ray     RTO 4 months/prn

## 2019-09-10 ENCOUNTER — PATIENT MESSAGE (OUTPATIENT)
Dept: RHEUMATOLOGY | Facility: CLINIC | Age: 44
End: 2019-09-10

## 2019-09-18 ENCOUNTER — PATIENT MESSAGE (OUTPATIENT)
Dept: TRANSPLANT | Facility: CLINIC | Age: 44
End: 2019-09-18

## 2019-09-18 DIAGNOSIS — K86.89 PANCREATIC INSUFFICIENCY: ICD-10-CM

## 2019-10-24 ENCOUNTER — PATIENT MESSAGE (OUTPATIENT)
Dept: INTERNAL MEDICINE | Facility: CLINIC | Age: 44
End: 2019-10-24

## 2019-10-25 ENCOUNTER — OFFICE VISIT (OUTPATIENT)
Dept: INTERNAL MEDICINE | Facility: CLINIC | Age: 44
End: 2019-10-25
Payer: COMMERCIAL

## 2019-10-25 ENCOUNTER — PATIENT MESSAGE (OUTPATIENT)
Dept: INTERNAL MEDICINE | Facility: CLINIC | Age: 44
End: 2019-10-25

## 2019-10-25 VITALS
WEIGHT: 118.19 LBS | BODY MASS INDEX: 22.31 KG/M2 | RESPIRATION RATE: 18 BRPM | TEMPERATURE: 99 F | DIASTOLIC BLOOD PRESSURE: 60 MMHG | SYSTOLIC BLOOD PRESSURE: 104 MMHG | HEIGHT: 61 IN | HEART RATE: 90 BPM

## 2019-10-25 DIAGNOSIS — B37.0 ORAL THRUSH: Primary | ICD-10-CM

## 2019-10-25 PROCEDURE — 99213 OFFICE O/P EST LOW 20 MIN: CPT | Mod: S$GLB,,, | Performed by: INTERNAL MEDICINE

## 2019-10-25 PROCEDURE — 3008F PR BODY MASS INDEX (BMI) DOCUMENTED: ICD-10-PCS | Mod: CPTII,S$GLB,, | Performed by: INTERNAL MEDICINE

## 2019-10-25 PROCEDURE — 99999 PR PBB SHADOW E&M-EST. PATIENT-LVL III: ICD-10-PCS | Mod: PBBFAC,,, | Performed by: INTERNAL MEDICINE

## 2019-10-25 PROCEDURE — 99213 PR OFFICE/OUTPT VISIT, EST, LEVL III, 20-29 MIN: ICD-10-PCS | Mod: S$GLB,,, | Performed by: INTERNAL MEDICINE

## 2019-10-25 PROCEDURE — 99999 PR PBB SHADOW E&M-EST. PATIENT-LVL III: CPT | Mod: PBBFAC,,, | Performed by: INTERNAL MEDICINE

## 2019-10-25 PROCEDURE — 3008F BODY MASS INDEX DOCD: CPT | Mod: CPTII,S$GLB,, | Performed by: INTERNAL MEDICINE

## 2019-10-25 RX ORDER — DEXTROAMPHETAMINE SACCHARATE, AMPHETAMINE ASPARTATE MONOHYDRATE, DEXTROAMPHETAMINE SULFATE AND AMPHETAMINE SULFATE 5; 5; 5; 5 MG/1; MG/1; MG/1; MG/1
20 CAPSULE, EXTENDED RELEASE ORAL EVERY MORNING
COMMUNITY
Start: 2019-10-18 | End: 2020-02-13

## 2019-10-25 RX ORDER — NYSTATIN 100000 [USP'U]/ML
6 SUSPENSION ORAL 4 TIMES DAILY
Qty: 240 ML | Refills: 0 | Status: SHIPPED | OUTPATIENT
Start: 2019-10-25 | End: 2019-11-04

## 2019-10-25 RX ORDER — PANCRELIPASE LIPASE, PANCRELIPASE PROTEASE, PANCRELIPASE AMYLASE 20000; 63000; 84000 [USP'U]/1; [USP'U]/1; [USP'U]/1
CAPSULE, DELAYED RELEASE ORAL
Refills: 0 | COMMUNITY
Start: 2019-09-17 | End: 2020-02-13

## 2019-10-25 NOTE — PROGRESS NOTES
Subjective:       Patient ID: Olinda Lombardi is a 44 y.o. female.    Chief Complaint: mouth issue (pain when she eats, corners of mouth cracking)    HPI   Pt with CF is here for evaluation of possible fungus in her mouth. She has generalized burning of her mouth after eating most types of foods. No throat swelling, difficulty breathing, SOB, Hx of HIV.   Review of Systems   Constitutional: Negative for activity change, appetite change, chills, diaphoresis, fatigue, fever and unexpected weight change.   HENT: Negative for mouth sores, postnasal drip, rhinorrhea, sinus pressure, sneezing, sore throat, trouble swallowing and voice change.    Respiratory: Negative for cough, shortness of breath and wheezing.    Cardiovascular: Negative for chest pain, palpitations and leg swelling.   Gastrointestinal: Negative for abdominal pain, blood in stool, constipation, diarrhea, nausea and vomiting.   Genitourinary: Negative for dysuria.   Musculoskeletal: Negative for arthralgias and myalgias.   Skin: Negative for rash and wound.   Allergic/Immunologic: Negative for environmental allergies and food allergies.   Hematological: Negative for adenopathy. Does not bruise/bleed easily.       Objective:      Physical Exam   Constitutional: She is oriented to person, place, and time. She appears well-developed and well-nourished. No distress.   HENT:   Head: Normocephalic and atraumatic.   Right Ear: External ear normal.   Left Ear: External ear normal.   Nose: Nose normal.   Mouth/Throat: Oropharynx is clear and moist. No oropharyngeal exudate.       Eyes: Pupils are equal, round, and reactive to light. Conjunctivae and EOM are normal. Right eye exhibits no discharge. Left eye exhibits no discharge. No scleral icterus.   Neck: Neck supple. No JVD present.   Cardiovascular: Normal rate, regular rhythm, normal heart sounds and intact distal pulses.   Pulmonary/Chest: Effort normal and breath sounds normal. No respiratory distress.  She has no wheezes. She has no rales.   Musculoskeletal: She exhibits no edema.   Lymphadenopathy:     She has no cervical adenopathy.   Neurological: She is alert and oriented to person, place, and time.   Skin: Skin is warm and dry. No rash noted. She is not diaphoretic. No pallor.       Assessment:       1. Oral thrush        Plan:    1. Rx Nystatin swish/swallow

## 2019-10-28 ENCOUNTER — PATIENT MESSAGE (OUTPATIENT)
Dept: TRANSPLANT | Facility: CLINIC | Age: 44
End: 2019-10-28

## 2019-10-30 ENCOUNTER — PATIENT MESSAGE (OUTPATIENT)
Dept: TRANSPLANT | Facility: CLINIC | Age: 44
End: 2019-10-30

## 2019-10-30 DIAGNOSIS — E84.9 CYSTIC FIBROSIS: Primary | ICD-10-CM

## 2019-11-11 ENCOUNTER — PATIENT MESSAGE (OUTPATIENT)
Dept: TRANSPLANT | Facility: CLINIC | Age: 44
End: 2019-11-11

## 2019-11-15 ENCOUNTER — TELEPHONE (OUTPATIENT)
Dept: TRANSPLANT | Facility: CLINIC | Age: 44
End: 2019-11-15

## 2019-11-15 NOTE — TELEPHONE ENCOUNTER
Contacted [facility] about PA for Trikafta. They note review is in progress. And state processing will take 48-72 business hours. Direct tel for CF: [phone], spoke with Allan

## 2019-11-15 NOTE — TELEPHONE ENCOUNTER
----- Message from Eulalia Quezada sent at 11/15/2019  1:09 PM CST -----  Contact:   Colette    [phone]    [place] /  [facility]     Rep calling to verify a medication with the Dr before being filled..  Call  Back to verify which medication needed ...

## 2019-11-19 NOTE — TELEPHONE ENCOUNTER
Contacted Theresa at [facility], Auth number provided. She states they will call patient to arrange shipment. Notified patient.    Spoke with Cassidy at [employer]: PA approved over the phone, Case ID / Auth # [id number] effective 11/19/19-11/18/20.    Contacted [facility]:   According to David, there is no PA on file, she states she will contact [facility] to assist with follow up regarding denial. Medication is denied at this time because it is not formulary per insurance.    Contacted [facility]:  [facility], they do not see approval of PA at this time. They recommend coordinator contact insurance company.

## 2019-12-03 ENCOUNTER — HOSPITAL ENCOUNTER (OUTPATIENT)
Dept: PULMONOLOGY | Facility: CLINIC | Age: 44
Discharge: HOME | End: 2019-12-03
Payer: COMMERCIAL

## 2019-12-03 ENCOUNTER — OFFICE VISIT (OUTPATIENT)
Dept: TRANSPLANT | Facility: CLINIC | Age: 44
End: 2019-12-03
Payer: COMMERCIAL

## 2019-12-03 ENCOUNTER — OFFICE VISIT (OUTPATIENT)
Dept: URGENT CARE | Facility: CLINIC | Age: 44
End: 2019-12-03
Payer: COMMERCIAL

## 2019-12-03 ENCOUNTER — PATIENT MESSAGE (OUTPATIENT)
Dept: TRANSPLANT | Facility: CLINIC | Age: 44
End: 2019-12-03

## 2019-12-03 VITALS
RESPIRATION RATE: 18 BRPM | DIASTOLIC BLOOD PRESSURE: 75 MMHG | SYSTOLIC BLOOD PRESSURE: 127 MMHG | WEIGHT: 125 LBS | BODY MASS INDEX: 23.6 KG/M2 | HEIGHT: 61 IN | TEMPERATURE: 98 F | OXYGEN SATURATION: 99 % | HEART RATE: 89 BPM

## 2019-12-03 VITALS
BODY MASS INDEX: 23.47 KG/M2 | RESPIRATION RATE: 20 BRPM | OXYGEN SATURATION: 100 % | HEART RATE: 89 BPM | DIASTOLIC BLOOD PRESSURE: 56 MMHG | SYSTOLIC BLOOD PRESSURE: 116 MMHG | HEIGHT: 61 IN | WEIGHT: 124.31 LBS | TEMPERATURE: 99 F

## 2019-12-03 DIAGNOSIS — E84.9 CYSTIC FIBROSIS: Primary | ICD-10-CM

## 2019-12-03 DIAGNOSIS — R30.0 DYSURIA: Primary | ICD-10-CM

## 2019-12-03 DIAGNOSIS — E84.9 CYSTIC FIBROSIS: ICD-10-CM

## 2019-12-03 DIAGNOSIS — N39.0 ACUTE UTI: ICD-10-CM

## 2019-12-03 DIAGNOSIS — R06.02 SOB (SHORTNESS OF BREATH): Primary | ICD-10-CM

## 2019-12-03 LAB
BILIRUB UR QL STRIP: NEGATIVE
FEF 25 75 LLN: 1.71
FEF 25 75 PRE REF: 44.4 %
FEF 25 75 REF: 2.87
FEV05 LLN: 1.1
FEV05 REF: 1.95
FEV1 FVC LLN: 71
FEV1 FVC PRE REF: 83.5 %
FEV1 FVC REF: 82
FEV1 LLN: 2.12
FEV1 PRE REF: 78.8 %
FEV1 REF: 2.67
FVC LLN: 2.59
FVC PRE REF: 94 %
FVC REF: 3.26
GLUCOSE UR QL STRIP: NEGATIVE
KETONES UR QL STRIP: NEGATIVE
LEUKOCYTE ESTERASE UR QL STRIP: NEGATIVE
MVV LLN: 84
MVV REF: 99
PEF LLN: 4.94
PEF PRE REF: 102.9 %
PEF REF: 6.49
PH, POC UA: 6.5 (ref 5–8)
PHYSICIAN COMMENT: ABNORMAL
POC BLOOD, URINE: POSITIVE
POC NITRATES, URINE: NEGATIVE
PRE FEF 25 75: 1.27 L/S (ref 1.71–4.02)
PRE FET 100: 7.13 SEC
PRE FEV05 REF: 86.3 %
PRE FEV1 FVC: 68.59 % (ref 71.14–93.21)
PRE FEV1: 2.1 L (ref 2.12–3.22)
PRE FEV5: 1.68 L (ref 1.1–2.81)
PRE FVC: 3.06 L (ref 2.59–3.93)
PRE PEF: 6.68 L/S (ref 4.94–8.05)
PROT UR QL STRIP: NEGATIVE
SP GR UR STRIP: 1.01 (ref 1–1.03)
UROBILINOGEN UR STRIP-ACNC: NORMAL (ref 0.1–1.1)

## 2019-12-03 PROCEDURE — 87147 CULTURE TYPE IMMUNOLOGIC: CPT

## 2019-12-03 PROCEDURE — 99999 PR PBB SHADOW E&M-EST. PATIENT-LVL III: ICD-10-PCS | Mod: PBBFAC,,, | Performed by: INTERNAL MEDICINE

## 2019-12-03 PROCEDURE — 99214 OFFICE O/P EST MOD 30 MIN: CPT | Mod: S$GLB,,, | Performed by: INTERNAL MEDICINE

## 2019-12-03 PROCEDURE — 3008F PR BODY MASS INDEX (BMI) DOCUMENTED: ICD-10-PCS | Mod: CPTII,S$GLB,, | Performed by: INTERNAL MEDICINE

## 2019-12-03 PROCEDURE — 94010 BREATHING CAPACITY TEST: CPT | Mod: S$GLB,,, | Performed by: INTERNAL MEDICINE

## 2019-12-03 PROCEDURE — 81003 POCT URINALYSIS, DIPSTICK, MANUAL, W/O SCOPE: ICD-10-PCS | Mod: QW,S$GLB,, | Performed by: INTERNAL MEDICINE

## 2019-12-03 PROCEDURE — 3008F BODY MASS INDEX DOCD: CPT | Mod: CPTII,S$GLB,, | Performed by: INTERNAL MEDICINE

## 2019-12-03 PROCEDURE — 99213 OFFICE O/P EST LOW 20 MIN: CPT | Mod: 25,S$GLB,, | Performed by: INTERNAL MEDICINE

## 2019-12-03 PROCEDURE — 99999 PR PBB SHADOW E&M-EST. PATIENT-LVL III: CPT | Mod: PBBFAC,,, | Performed by: INTERNAL MEDICINE

## 2019-12-03 PROCEDURE — 99213 PR OFFICE/OUTPT VISIT, EST, LEVL III, 20-29 MIN: ICD-10-PCS | Mod: 25,S$GLB,, | Performed by: INTERNAL MEDICINE

## 2019-12-03 PROCEDURE — 87088 URINE BACTERIA CULTURE: CPT

## 2019-12-03 PROCEDURE — 94010 BREATHING CAPACITY TEST: ICD-10-PCS | Mod: S$GLB,,, | Performed by: INTERNAL MEDICINE

## 2019-12-03 PROCEDURE — 99214 PR OFFICE/OUTPT VISIT, EST, LEVL IV, 30-39 MIN: ICD-10-PCS | Mod: S$GLB,,, | Performed by: INTERNAL MEDICINE

## 2019-12-03 PROCEDURE — 81003 URINALYSIS AUTO W/O SCOPE: CPT | Mod: QW,S$GLB,, | Performed by: INTERNAL MEDICINE

## 2019-12-03 PROCEDURE — 87086 URINE CULTURE/COLONY COUNT: CPT

## 2019-12-03 RX ORDER — ELEXACAFTOR, TEZACAFTOR, AND IVACAFTOR 100-50-75
1 KIT ORAL 2 TIMES DAILY
COMMUNITY
Start: 2019-11-25 | End: 2020-03-13 | Stop reason: SDUPTHER

## 2019-12-03 RX ORDER — NITROFURANTOIN 25; 75 MG/1; MG/1
100 CAPSULE ORAL 2 TIMES DAILY
Qty: 14 CAPSULE | Refills: 0 | Status: SHIPPED | OUTPATIENT
Start: 2019-12-03 | End: 2019-12-10

## 2019-12-03 NOTE — PROGRESS NOTES
CYSTIC FIBROSIS FOLLOW-UP                                                                                               History of Present Illness: Olinda Lombardi is a 44 y.o. female who is on 0L of oxygen. She is on no assisted ventilation.  Her New York Heart Association Class is II and a Karnofsky score of 90% - Able to carry on normal activity: minor symptoms of disease. She is not diabetic.   She presents to clinic today for follow up. She was last seen 6/6/19. Since last visit she has started the Trikafta. She started 8 days ago. Since starting, no adverse reactions. Denies any cough. She is not doing any pulmonary hygiene. She has had some headaches and some bloating which she believes are side effects to the Trikafta. She has noted weight gain as well.     Review of Systems   Constitutional: Negative for chills, diaphoresis, fever, malaise/fatigue and weight loss.   HENT: Negative for congestion, ear discharge, ear pain, hearing loss and sore throat.    Eyes: Negative for blurred vision, double vision and photophobia.   Respiratory: Positive for shortness of breath. Negative for cough, hemoptysis, sputum production and wheezing.    Cardiovascular: Negative for chest pain, palpitations, orthopnea, claudication, leg swelling and PND.   Gastrointestinal: Negative for abdominal pain, constipation, diarrhea, heartburn, melena, nausea and vomiting.   Genitourinary: Negative for dysuria, flank pain, frequency, hematuria and urgency.   Musculoskeletal: Negative for back pain, falls, joint pain, myalgias and neck pain.   Skin: Negative for itching and rash.   Neurological: Negative for dizziness, tremors, sensory change, loss of consciousness, weakness and headaches.   Endo/Heme/Allergies: Does not bruise/bleed easily.   Psychiatric/Behavioral: Negative for depression, hallucinations and memory loss. The patient is not nervous/anxious and does not have insomnia.      Objective:   BP (!) 116/56   Pulse 89    "Temp 98.7 °F (37.1 °C) (Oral)   Resp 20   Ht 5' 1" (1.549 m)   Wt 56.4 kg (124 lb 5.4 oz)   SpO2 100% Comment: room air  BMI 23.49 kg/m²      Physical Exam   Constitutional: She is oriented to person, place, and time and well-developed, well-nourished, and in no distress. No distress.   HENT:   Head: Normocephalic and atraumatic.   Nose: Nose normal.   Mouth/Throat: Oropharynx is clear and moist. No oropharyngeal exudate.   Eyes: Pupils are equal, round, and reactive to light. Conjunctivae and EOM are normal. No scleral icterus.   Neck: Normal range of motion. Neck supple. No JVD present. No tracheal deviation present. No thyromegaly present.   Cardiovascular: Normal rate, regular rhythm and intact distal pulses. Exam reveals no gallop and no friction rub.   No murmur heard.  Pulmonary/Chest: Effort normal. No stridor. No respiratory distress. She has no wheezes. She has no rales. She exhibits no tenderness.   Abdominal: Soft. Bowel sounds are normal. She exhibits no distension and no mass. There is no tenderness. There is no rebound and no guarding.   Musculoskeletal: Normal range of motion. She exhibits no edema.   Lymphadenopathy:     She has no cervical adenopathy.   Neurological: She is alert and oriented to person, place, and time. No cranial nerve deficit. Gait normal. Coordination normal.   Skin: Skin is warm and dry. No rash noted. She is not diaphoretic. No erythema. No pallor.   Psychiatric: Mood and affect normal.     Labs:  Hospital Outpatient Visit on 12/03/2019   Component Date Value    Pre FVC 12/03/2019 3.06     PRE FEV5 12/03/2019 1.68     Pre FEV1 12/03/2019 2.10*    Pre FEV1 FVC 12/03/2019 68.59*    Pre FEF 25 75 12/03/2019 1.27*    Pre PEF 12/03/2019 6.68     Pre  12/03/2019 7.13     FVC Ref 12/03/2019 3.26     FVC LLN 12/03/2019 2.59     FVC Pre Ref 12/03/2019 94.0     FEV05 REF 12/03/2019 1.95     FEV05 LLN 12/03/2019 1.10     PRE FEV05 REF 12/03/2019 86.3     FEV1 " Ref 12/03/2019 2.67     FEV1 LLN 12/03/2019 2.12     FEV1 Pre Ref 12/03/2019 78.8     FEV1 FVC Ref 12/03/2019 82     FEV1 FVC LLN 12/03/2019 71     FEV1 FVC Pre Ref 12/03/2019 83.5     FEF 25 75 Ref 12/03/2019 2.87     FEF 25 75 LLN 12/03/2019 1.71     FEF 25 75 Pre Ref 12/03/2019 44.4     PEF Ref 12/03/2019 6.49     PEF LLN 12/03/2019 4.94     PEF Pre Ref 12/03/2019 102.9    Lab Visit on 12/03/2019   Component Date Value    GGT 12/03/2019 16     Hemoglobin A1C 12/03/2019 6.4*    Estimated Avg Glucose 12/03/2019 137*       Pulmonary Function Tests 12/3/2019 6/5/2019 5/27/2019 5/13/2019 3/21/2019 9/11/2018 6/12/2018   FVC 3.06 3.12 2.87 3.13 3.03 2.95 3.09   FEV1 2.1 2.15 2.03 2.05 2 2.02 2.04   FVC% 94 104 96 105 101 98 103   FEV1% 79 85 80 81 78 79 80   FEF 25-75 - - - 1.01 1.05 1.16 1.11   FEF 25-75% - - - 34 36 39 38     No flowsheet data found.    Assessment:-  1. Cystic fibrosis      Plan:   1. Will continue on the Trikafta. Encouraged to do pulmonary hygiene. Lung function is stable, but no improvement as of yet.      RTC in 3 months w/ repeat PFTs    Lam Brantley MD  Pulm/CC Fellow PGY6    Attending Note:    I have seen and evaluated the patient with the fellow. Their note reflects the content of our discussion and my plan of care.      Chadwick Pate MD  Pulmonary/Critical Care Medicine

## 2019-12-04 LAB — BACTERIA UR CULT: ABNORMAL

## 2019-12-04 NOTE — PROGRESS NOTES
"Subjective:       Patient ID: Olinda Lombardi is a 44 y.o. female.    Vitals:  height is 5' 1" (1.549 m) and weight is 56.7 kg (125 lb). Her oral temperature is 98 °F (36.7 °C). Her blood pressure is 127/75 and her pulse is 89. Her respiration is 18 and oxygen saturation is 99%.     Chief Complaint: Urinary Tract Infection    Urinary Tract Infection    This is a new problem. The current episode started in the past 7 days (3 days). The problem occurs every urination. The problem has been unchanged. The quality of the pain is described as aching. The pain is at a severity of 4/10. The pain is moderate. There has been no fever. The fever has been present for less than 1 day. She is sexually active. There is no history of pyelonephritis. Associated symptoms include frequency, hematuria and urgency. Pertinent negatives include no chills, nausea, vomiting or rash. She has tried increased fluids and NSAIDs for the symptoms. The treatment provided mild relief. Her past medical history is significant for diabetes mellitus and kidney stones.       Constitution: Negative for chills and fever.   Neck: Negative for painful lymph nodes.   Gastrointestinal: Negative for abdominal pain, nausea and vomiting.   Genitourinary: Positive for dysuria, frequency, urgency and hematuria. Negative for urine decreased, history of kidney stones, painful menstruation, irregular menstruation, missed menses, heavy menstrual bleeding, ovarian cysts, genital trauma, vaginal pain, vaginal discharge, vaginal bleeding, vaginal odor, painful intercourse, genital sore, painful ejaculation and pelvic pain.   Musculoskeletal: Negative for back pain.   Skin: Negative for rash and lesion.   Hematologic/Lymphatic: Negative for swollen lymph nodes.       Objective:      Physical Exam   Constitutional: She appears well-developed and well-nourished.   HENT:   Head: Normocephalic and atraumatic.   Eyes: Pupils are equal, round, and reactive to light. " Conjunctivae and EOM are normal.   Abdominal: Soft. Bowel sounds are normal.   Nursing note and vitals reviewed.        Assessment:       1. Dysuria    2. Acute UTI        Plan:         Dysuria  -     POCT Urinalysis, Dipstick, Manual, w/o Scope    Acute UTI  -     nitrofurantoin, macrocrystal-monohydrate, (MACROBID) 100 MG capsule; Take 1 capsule (100 mg total) by mouth 2 (two) times daily. for 7 days  Dispense: 14 capsule; Refill: 0  -     Culture, Urine

## 2019-12-05 ENCOUNTER — HOSPITAL ENCOUNTER (EMERGENCY)
Facility: HOSPITAL | Age: 44
Discharge: HOME | End: 2019-12-06
Attending: EMERGENCY MEDICINE
Payer: COMMERCIAL

## 2019-12-05 VITALS
OXYGEN SATURATION: 100 % | BODY MASS INDEX: 23.41 KG/M2 | SYSTOLIC BLOOD PRESSURE: 129 MMHG | RESPIRATION RATE: 17 BRPM | HEIGHT: 61 IN | DIASTOLIC BLOOD PRESSURE: 79 MMHG | WEIGHT: 124 LBS | TEMPERATURE: 98 F | HEART RATE: 84 BPM

## 2019-12-05 DIAGNOSIS — M79.89 LEG SWELLING: ICD-10-CM

## 2019-12-05 DIAGNOSIS — D64.9 ANEMIA, UNSPECIFIED TYPE: Primary | ICD-10-CM

## 2019-12-05 DIAGNOSIS — R60.0 LEG EDEMA: ICD-10-CM

## 2019-12-05 LAB
BASOPHILS # BLD AUTO: 0.07 K/UL (ref 0–0.2)
BASOPHILS NFR BLD: 0.8 % (ref 0–1.9)
DIFFERENTIAL METHOD: ABNORMAL
EOSINOPHIL # BLD AUTO: 0.2 K/UL (ref 0–0.5)
EOSINOPHIL NFR BLD: 2.3 % (ref 0–8)
ERYTHROCYTE [DISTWIDTH] IN BLOOD BY AUTOMATED COUNT: 15.9 % (ref 11.5–14.5)
HCT VFR BLD AUTO: 27.9 % (ref 37–48.5)
HGB BLD-MCNC: 8.1 G/DL (ref 12–16)
IMM GRANULOCYTES # BLD AUTO: 0.03 K/UL (ref 0–0.04)
IMM GRANULOCYTES NFR BLD AUTO: 0.3 % (ref 0–0.5)
LYMPHOCYTES # BLD AUTO: 1.6 K/UL (ref 1–4.8)
LYMPHOCYTES NFR BLD: 17.9 % (ref 18–48)
MCH RBC QN AUTO: 21.9 PG (ref 27–31)
MCHC RBC AUTO-ENTMCNC: 29 G/DL (ref 32–36)
MCV RBC AUTO: 75 FL (ref 82–98)
MONOCYTES # BLD AUTO: 0.5 K/UL (ref 0.3–1)
MONOCYTES NFR BLD: 5.7 % (ref 4–15)
NEUTROPHILS # BLD AUTO: 6.3 K/UL (ref 1.8–7.7)
NEUTROPHILS NFR BLD: 73 % (ref 38–73)
NRBC BLD-RTO: 0 /100 WBC
PLATELET # BLD AUTO: 423 K/UL (ref 150–350)
PMV BLD AUTO: 9.2 FL (ref 9.2–12.9)
RBC # BLD AUTO: 3.7 M/UL (ref 4–5.4)
WBC # BLD AUTO: 8.66 K/UL (ref 3.9–12.7)

## 2019-12-05 PROCEDURE — 93005 ELECTROCARDIOGRAM TRACING: CPT

## 2019-12-05 PROCEDURE — 82550 ASSAY OF CK (CPK): CPT

## 2019-12-05 PROCEDURE — 99285 PR EMERGENCY DEPT VISIT,LEVEL V: ICD-10-PCS | Mod: ,,, | Performed by: EMERGENCY MEDICINE

## 2019-12-05 PROCEDURE — 93010 EKG 12-LEAD: ICD-10-PCS | Mod: ,,, | Performed by: INTERNAL MEDICINE

## 2019-12-05 PROCEDURE — 99285 EMERGENCY DEPT VISIT HI MDM: CPT | Mod: ,,, | Performed by: EMERGENCY MEDICINE

## 2019-12-05 PROCEDURE — 99285 EMERGENCY DEPT VISIT HI MDM: CPT | Mod: 25

## 2019-12-05 PROCEDURE — 85025 COMPLETE CBC W/AUTO DIFF WBC: CPT

## 2019-12-05 PROCEDURE — 93010 ELECTROCARDIOGRAM REPORT: CPT | Mod: ,,, | Performed by: INTERNAL MEDICINE

## 2019-12-05 PROCEDURE — 80053 COMPREHEN METABOLIC PANEL: CPT

## 2019-12-06 LAB
ALBUMIN SERPL BCP-MCNC: 3.8 G/DL (ref 3.5–5.2)
ALP SERPL-CCNC: 114 U/L (ref 55–135)
ALT SERPL W/O P-5'-P-CCNC: 25 U/L (ref 10–44)
ANION GAP SERPL CALC-SCNC: 5 MMOL/L (ref 8–16)
AST SERPL-CCNC: 24 U/L (ref 10–40)
BILIRUB SERPL-MCNC: 0.3 MG/DL (ref 0.1–1)
BUN SERPL-MCNC: 12 MG/DL (ref 6–20)
CALCIUM SERPL-MCNC: 10 MG/DL (ref 8.7–10.5)
CHLORIDE SERPL-SCNC: 106 MMOL/L (ref 95–110)
CK SERPL-CCNC: 111 U/L (ref 20–180)
CO2 SERPL-SCNC: 29 MMOL/L (ref 23–29)
CREAT SERPL-MCNC: 0.7 MG/DL (ref 0.5–1.4)
EST. GFR  (AFRICAN AMERICAN): >60 ML/MIN/1.73 M^2
EST. GFR  (NON AFRICAN AMERICAN): >60 ML/MIN/1.73 M^2
GLUCOSE SERPL-MCNC: 92 MG/DL (ref 70–110)
POTASSIUM SERPL-SCNC: 4.1 MMOL/L (ref 3.5–5.1)
PROT SERPL-MCNC: 7.5 G/DL (ref 6–8.4)
SODIUM SERPL-SCNC: 140 MMOL/L (ref 136–145)

## 2019-12-06 NOTE — PROVIDER PROGRESS NOTES - EMERGENCY DEPT.
Encounter Date: 12/5/2019    ED Physician Progress Notes         EKG - STEMI Decision  Initial Reading: No STEMI present.

## 2019-12-06 NOTE — ED PROVIDER NOTES
"Source of History:  patient    Chief complaint:  Leg Swelling (Pt reports bilat leg sweeling after "being put on a new drug that cost $300,000 for my Cystic Fibrosis." )      HPI:  Olinda Lombardi is a 44 y.o. female presenting with new onset SOB and leg swelling for several days. She notes that 9 days ago started Trikafta for her CF and sx have started since then.  She notes her cough has improved, but for 4-5 becomes dyspneic with exertion or speaking intermittently.  Today after being at her desk chair for several hours, she went to the mall and noted both her calves were more swollen than she is used to.  She notes diminished sensation over the anterior calf, but denies any pain or redness.  No fevers, no weakness.  No other pain or discomfort associated.    ROS: As per HPI and below:    General: No fever.  No chills.  Eyes: No visual changes.  Head: No headache.    Integument: No rashes or lesions.  Chest: shortness of breath.  Cardiovascular: No chest pain.  Abdomen: No abdominal pain.  No nausea or vomiting.  Urinary: No abnormal urination.  Neurologic: No focal weakness.  No numbness.  Hematologic: No easy bruising.  Endocrine: No excessive thirst or urination.      Review of patient's allergies indicates:   Allergen Reactions    Milk containing products Diarrhea    Penicillin     Strawberry        No current facility-administered medications on file prior to encounter.      Current Outpatient Medications on File Prior to Encounter   Medication Sig Dispense Refill    ACZONE 7.5 % GlwP Apply 1 application topically once daily.      alprazolam (XANAX) 0.5 MG tablet Take 0.5 mg by mouth 2 (two) times daily as needed.       blood sugar diagnostic Strp To check BG 10 times daily, to use with insurance preferred meter 400 strip 3    blood-glucose meter kit To check BG 10  times daily, to use with insurance preferred meter 1 each 0    cholecalciferol, vitamin D3, (VITAMIN D3) 2,000 unit Cap Take 2 " capsules by mouth once daily.      dextroamphetamine-amphetamine (ADDERALL XR) 20 MG 24 hr capsule Take 20 mg by mouth every morning.       dextroamphetamine-amphetamine (ADDERALL) 10 mg Tab Take 5-10 mg by mouth once daily. Daily in the afternoons      ferrous sulfate 325 (65 FE) MG EC tablet Take 325 mg by mouth once daily.      FIASP FLEXTOUCH U-100 INSULIN 100 unit/mL (3 mL) InPn INJECT 10 UNITS INTO THE SKIN THREE TIMES DAILY BEFORE MEALS 15 mL 1    fluticasone propionate (FLONASE) 50 mcg/actuation nasal spray 2 sprays (100 mcg total) by Each Nare route once daily. 1 Bottle 3    HYPER-SAL 7 % nebulizer solution Take 4 mLs by nebulization 2 (two) times daily. (Patient taking differently: Take 4 mLs by nebulization 2 (two) times daily. Not taking as prescribed) 240 mL 12    lancets Misc To check BG 10 times daily, to use with insurance preferred meter 400 each 3    levocetirizine (XYZAL) 5 MG tablet Take 5 mg by mouth daily as needed.       lipase-protease-amylase (ZENPEP) 40,000-126,000- 168,000 unit CpDR Take 4 capsules by mouth 3 (three) times daily with meals. 400 capsule 0    lipase-protease-amylase 20,000-68,000 -109,000 unit CpDR Take 7 capsules by mouth 3 (three) times daily with meals. 630 capsule 12    multivit,min52-folic-vitK-cQ10 (AQUADEKS) 100-700-10 mcg-mcg-mg Cap cap Take 1 capsule by mouth once daily. 30 capsule 12    multivit-minerals/folic acid (ONE-A-DAY WOMEN VITACRAVES ORAL) Take 1 Dose by mouth once daily.      nebulizer accessories (ERAPID NEBULIZER HANDSET) Misc 2 each by Misc.(Non-Drug; Combo Route) route 3 (three) times daily. 2 each 0    nitrofurantoin, macrocrystal-monohydrate, (MACROBID) 100 MG capsule Take 1 capsule (100 mg total) by mouth 2 (two) times daily. for 7 days 14 capsule 0    ONABOTULINUMTOXINA (BOTOX COSMETIC INJ) Inject as directed.      sertraline (ZOLOFT) 100 MG tablet Take 100 mg by mouth 2 (two) times daily.       TAZORAC 0.05 % Crea cream        "traZODone (DESYREL) 100 MG tablet Take 1 tablet by mouth nightly as needed.      TRIKAFTA 100-50-75 mg(d) /150 mg (n) TbSQ Take 1 capsule by mouth 2 (two) times daily.      XOPENEX HFA 45 mcg/actuation inhaler Inhale 1-2 puffs into the lungs every 4 (four) hours as needed for Wheezing. 1 Inhaler 12    ZENPEP 20,000-63,000- 84,000 unit CpDR   0    dextroamphetamine-amphetamine (ADDERALL XR) 15 MG 24 hr capsule Take 15 mg by mouth every morning.      NOVOFINE PLUS 32 gauge x 1/6" Ndle       pantoprazole (PROTONIX) 40 MG tablet Take 1 tablet (40 mg total) by mouth once daily. 30 tablet 0    [DISCONTINUED] linaCLOtide (LINZESS) 145 mcg Cap capsule Take 1 capsule (145 mcg total) by mouth once daily. 30 capsule 0    [DISCONTINUED] tobramycin 300 mg/4 mL Nebu Inhale 300 mg into the lungs 2 (two) times daily. (Patient not taking: Reported on 10/25/2019) 240 mL 11       PMH:  As per HPI and below:  Past Medical History:   Diagnosis Date    Abnormal Pap smear of vagina     Bilateral carpal tunnel syndrome     Cystic fibrosis     Cystic fibrosis     Diabetes mellitus     CFRD    Psoriasis      Past Surgical History:   Procedure Laterality Date    ADENOIDECTOMY      COLONOSCOPY N/A 12/18/2018    Procedure: COLONOSCOPY;  Surgeon: Ernestina Onofre MD;  Location: [facility] ENDO;  Service: Endoscopy;  Laterality: N/A;    ESOPHAGOGASTRODUODENOSCOPY N/A 12/18/2018    Procedure: ESOPHAGOGASTRODUODENOSCOPY (EGD);  Surgeon: Ernestina Onofre MD;  Location: [facility] ENDO;  Service: Endoscopy;  Laterality: N/A;    SINUS SURGERY         Social History     Socioeconomic History    Marital status:      Spouse name: Not on file    Number of children: Not on file    Years of education: Not on file    Highest education level: Not on file   Occupational History    Occupation: Relator    Social Needs    Financial resource strain: Not on file    Food insecurity:     Worry: Not on file     Inability: Not on file    " Transportation needs:     Medical: Not on file     Non-medical: Not on file   Tobacco Use    Smoking status: Never Smoker    Smokeless tobacco: Never Used   Substance and Sexual Activity    Alcohol use: Not Currently     Alcohol/week: 2.0 standard drinks     Types: 2 Glasses of wine per week     Comment: 1-2 drinks a week     Drug use: No    Sexual activity: Yes     Partners: Male     Birth control/protection: None     Comment:     Lifestyle    Physical activity:     Days per week: Not on file     Minutes per session: Not on file    Stress: Not on file   Relationships    Social connections:     Talks on phone: Not on file     Gets together: Not on file     Attends [employer] service: Not on file     Active member of club or organization: Not on file     Attends meetings of clubs or organizations: Not on file     Relationship status: Not on file   Other Topics Concern    Not on file   Social History Narrative    Not on file       Family History   Problem Relation Age of Onset    Heart disease Father     Diabetes Mother     Breast cancer Maternal Grandmother     Colon cancer Neg Hx     Ovarian cancer Neg Hx     Celiac disease Neg Hx     Crohn's disease Neg Hx     Esophageal cancer Neg Hx     Inflammatory bowel disease Neg Hx     Irritable bowel syndrome Neg Hx     Liver cancer Neg Hx     Rectal cancer Neg Hx     Stomach cancer Neg Hx     Ulcerative colitis Neg Hx        Physical Exam:    Vitals:    12/05/19 2240   BP: 129/70   Pulse: 88   Resp: 20   Temp:      Appearance: No acute distress. Speaking in full sentences without obvious dyspnea.  Skin: No rashes seen.  Good turgor.  No abrasions.  No ecchymoses.  Eyes: No conjunctival injection. EOMI, PERRL.  ENT: Oropharynx clear.    Chest: Clear to auscultation bilaterally.  Good air movement.  No wheezes.  No rhonchi.  Cardiovascular: Regular rate and rhythm.  No murmurs. No gallops. No rubs.  Abdomen: Soft.  Not distended.  Nontender.  No  guarding.  No rebound. No Masses  Musculoskeletal: Good range of motion all joints.  No deformities.  Neck supple.  No meningismus.  Neurologic: Moves all extremities.  Normal sensation.  No facial droop.  Normal speech.    Mental Status:  Alert and oriented x 3.  Appropriate, conversant.          Laboratory Studies:  Labs Reviewed   CK   CBC W/ AUTO DIFFERENTIAL   COMPREHENSIVE METABOLIC PANEL       I decided to obtain the old medical records.  Reviewed and summarized the old medical record and it showed recent visit with pulm to start Trikafta, macrobid 2 days ago  I independently interpreted the CXR:  No significant change  I independently interpreted the EKG:  NSR no ST changes, normal intervals, no TWI, no significant change from previous.    Imaging Results    None         Medications Given:  Medications - No data to display    Discussed with:  Patient    MDM:    44 y.o. female with subjective leg swelling and dyspnea over the last 2 days.  She continues to have a saturation at 99% and is speaking in full long sentences, she has a normal respiratory rate.  Chest x-ray appears no worse, no worsening pulmonary edema. Labs show no elevation of CPK explain leg swelling or discomfort, normal electrolytes, albumin.  Hemoglobin is noted to have trended down from previous, although patient denies any bloody stools.  She does note she is on her menses.  No obvious cause for her dyspnea, although anemia may be playing a role.  Advised follow-up with her primary doctor/lung doctor for further treatment and management.  Stable for discharge.  Advised pt to follow up with PCP or return if concerning symptoms arise. Pt understands and agrees with plan. Will d/c home.          Diagnostic Impression:    1. Leg swelling    2. Leg edema               Ilir Quezada MD  12/06/19 0043

## 2019-12-06 NOTE — ED TRIAGE NOTES
Pt with hx of CF; states she started a new medication Trikafta 9 days ago.  Pt reporting increased SOB over the past 3-4 days and states she noticed swelling of both legs this afternoon as well as leg pain in both legs.  Pt states she also started Macrobid for a UTI yesterday.

## 2019-12-10 ENCOUNTER — TELEPHONE (OUTPATIENT)
Dept: INTERNAL MEDICINE | Facility: CLINIC | Age: 44
End: 2019-12-10

## 2019-12-10 NOTE — TELEPHONE ENCOUNTER
----- Message from Arianne Quezada sent at 12/10/2019  1:44 PM CST -----  Contact: self  [phone]  Patient would like to get medical advice she has some concerns was seen in the ER please advise

## 2019-12-10 NOTE — TELEPHONE ENCOUNTER
Upset er didn't treat uti that she told them she had as they were discharging.  avs says to see dr gaitan for fol up and she wanted him to handle over phone.  I explained appt necessary    We made appt for Thursday.  Date/time read back

## 2019-12-10 NOTE — TELEPHONE ENCOUNTER
----- Message from Kayleen Rothman sent at 12/10/2019  3:21 PM CST -----  Contact: Patient [phone]  Patient is returning a phone call.  Who left a message for the patient: Stacey  Does patient know what this is regarding:    Comments:

## 2019-12-12 ENCOUNTER — LAB VISIT (OUTPATIENT)
Dept: LAB | Facility: HOSPITAL | Age: 44
End: 2019-12-12
Attending: INTERNAL MEDICINE
Payer: COMMERCIAL

## 2019-12-12 ENCOUNTER — OFFICE VISIT (OUTPATIENT)
Dept: INTERNAL MEDICINE | Facility: CLINIC | Age: 44
End: 2019-12-12
Payer: COMMERCIAL

## 2019-12-12 VITALS
BODY MASS INDEX: 22.6 KG/M2 | DIASTOLIC BLOOD PRESSURE: 72 MMHG | HEIGHT: 61 IN | RESPIRATION RATE: 18 BRPM | SYSTOLIC BLOOD PRESSURE: 116 MMHG | TEMPERATURE: 99 F | WEIGHT: 119.69 LBS | HEART RATE: 83 BPM

## 2019-12-12 DIAGNOSIS — E84.8 DIABETES MELLITUS RELATED TO CYSTIC FIBROSIS: ICD-10-CM

## 2019-12-12 DIAGNOSIS — F98.8 ATTENTION DEFICIT DISORDER, UNSPECIFIED HYPERACTIVITY PRESENCE: ICD-10-CM

## 2019-12-12 DIAGNOSIS — G47.00 INSOMNIA, UNSPECIFIED TYPE: ICD-10-CM

## 2019-12-12 DIAGNOSIS — E08.9 DIABETES MELLITUS RELATED TO CYSTIC FIBROSIS: ICD-10-CM

## 2019-12-12 DIAGNOSIS — K86.89 PANCREATIC INSUFFICIENCY DUE TO CYSTIC FIBROSIS: ICD-10-CM

## 2019-12-12 DIAGNOSIS — E84.9 CYSTIC FIBROSIS: ICD-10-CM

## 2019-12-12 DIAGNOSIS — E84.8 PANCREATIC INSUFFICIENCY DUE TO CYSTIC FIBROSIS: ICD-10-CM

## 2019-12-12 DIAGNOSIS — F41.9 ANXIETY: ICD-10-CM

## 2019-12-12 DIAGNOSIS — D50.9 MICROCYTIC ANEMIA: Primary | ICD-10-CM

## 2019-12-12 DIAGNOSIS — D50.9 MICROCYTIC ANEMIA: ICD-10-CM

## 2019-12-12 LAB — RETICS/RBC NFR AUTO: 2.9 % (ref 0.5–2.5)

## 2019-12-12 PROCEDURE — 3008F BODY MASS INDEX DOCD: CPT | Mod: CPTII,S$GLB,, | Performed by: INTERNAL MEDICINE

## 2019-12-12 PROCEDURE — 84439 ASSAY OF FREE THYROXINE: CPT

## 2019-12-12 PROCEDURE — 3008F PR BODY MASS INDEX (BMI) DOCUMENTED: ICD-10-PCS | Mod: CPTII,S$GLB,, | Performed by: INTERNAL MEDICINE

## 2019-12-12 PROCEDURE — 99214 PR OFFICE/OUTPT VISIT, EST, LEVL IV, 30-39 MIN: ICD-10-PCS | Mod: S$GLB,,, | Performed by: INTERNAL MEDICINE

## 2019-12-12 PROCEDURE — 82728 ASSAY OF FERRITIN: CPT

## 2019-12-12 PROCEDURE — 99214 OFFICE O/P EST MOD 30 MIN: CPT | Mod: S$GLB,,, | Performed by: INTERNAL MEDICINE

## 2019-12-12 PROCEDURE — 99999 PR PBB SHADOW E&M-EST. PATIENT-LVL III: CPT | Mod: PBBFAC,,, | Performed by: INTERNAL MEDICINE

## 2019-12-12 PROCEDURE — 84443 ASSAY THYROID STIM HORMONE: CPT

## 2019-12-12 PROCEDURE — 85045 AUTOMATED RETICULOCYTE COUNT: CPT

## 2019-12-12 PROCEDURE — 83540 ASSAY OF IRON: CPT

## 2019-12-12 PROCEDURE — 36415 COLL VENOUS BLD VENIPUNCTURE: CPT | Mod: PO

## 2019-12-12 PROCEDURE — 99999 PR PBB SHADOW E&M-EST. PATIENT-LVL III: ICD-10-PCS | Mod: PBBFAC,,, | Performed by: INTERNAL MEDICINE

## 2019-12-12 NOTE — PROGRESS NOTES
Subjective:       Patient ID: Olinda Lombardi is a 44 y.o. female.    Chief Complaint: Follow-up (ER follow)    HPI   Pt with cystic fibrosis, T2DM, pancreatic insufficiency, Insomnia, Anxiety, ADD is here for ED f/u on 12/5/19 for SOB and leg swelling x a few days. Pt was found to have microcytic anemia with a Hg down to  8.1. She does admit to mild BRBPR but has noticed it for the last few weeks. She denies any dark colored stools, melena, abdominal pain or significant vaginal bleeding.   Review of Systems   Constitutional: Negative for activity change, appetite change, chills, diaphoresis, fatigue, fever and unexpected weight change.   HENT: Negative for postnasal drip, rhinorrhea, sinus pressure, sneezing, sore throat, trouble swallowing and voice change.    Respiratory: Negative for cough, shortness of breath and wheezing.    Cardiovascular: Negative for chest pain, palpitations and leg swelling.   Gastrointestinal: Negative for abdominal pain, blood in stool, constipation, diarrhea, nausea and vomiting.   Genitourinary: Negative for dysuria.   Musculoskeletal: Negative for arthralgias and myalgias.   Skin: Negative for rash and wound.   Allergic/Immunologic: Negative for environmental allergies and food allergies.   Hematological: Negative for adenopathy. Does not bruise/bleed easily.   Psychiatric/Behavioral: Positive for decreased concentration and sleep disturbance. Negative for self-injury and suicidal ideas. The patient is nervous/anxious.        Objective:      Physical Exam   Constitutional: She is oriented to person, place, and time. She appears well-developed and well-nourished. No distress.   HENT:   Head: Normocephalic and atraumatic.   Eyes: Pupils are equal, round, and reactive to light. Conjunctivae and EOM are normal. Right eye exhibits no discharge. Left eye exhibits no discharge. No scleral icterus.   Neck: Neck supple. No JVD present.   Cardiovascular: Normal rate, regular rhythm, normal  heart sounds and intact distal pulses.   Pulmonary/Chest: Effort normal and breath sounds normal. No respiratory distress. She has no wheezes. She has no rales.   Abdominal: Soft. Bowel sounds are normal. There is no tenderness.   Musculoskeletal: She exhibits no edema.   Lymphadenopathy:     She has no cervical adenopathy.   Neurological: She is alert and oriented to person, place, and time.   Skin: Skin is warm and dry. No rash noted. She is not diaphoretic. No pallor.       Assessment:       1. Microcytic anemia    2. Cystic fibrosis    3. Diabetes mellitus related to cystic fibrosis    4. Pancreatic insufficiency due to cystic fibrosis    5. Insomnia, unspecified type    6. Anxiety    7. Attention deficit disorder, unspecified hyperactivity presence        Plan:    1. Referral to GI and check Iron studies        Pt currently on Iron supplement daily    2. CF- stable, followed by Pulm    3. T2DM related to CF- stable, CPT       Followed by Endocrine   4. Pancreatic insufficiency- stable on pancreatic enzymes    5. Insomnia- stable on Trazodone qHS   6. Anxiety- stable on Zoloft/Xanax   7. ADD- stable on Adderall, followed by Psyc

## 2019-12-13 ENCOUNTER — PATIENT MESSAGE (OUTPATIENT)
Dept: INTERNAL MEDICINE | Facility: CLINIC | Age: 44
End: 2019-12-13

## 2019-12-13 LAB
FERRITIN SERPL-MCNC: 5 NG/ML (ref 20–300)
IRON SERPL-MCNC: 64 UG/DL (ref 30–160)
SATURATED IRON: 13 % (ref 20–50)
T4 FREE SERPL-MCNC: 0.85 NG/DL (ref 0.71–1.51)
TOTAL IRON BINDING CAPACITY: 487 UG/DL (ref 250–450)
TRANSFERRIN SERPL-MCNC: 329 MG/DL (ref 200–375)
TSH SERPL DL<=0.005 MIU/L-ACNC: 0.95 UIU/ML (ref 0.4–4)

## 2019-12-16 ENCOUNTER — TELEPHONE (OUTPATIENT)
Dept: OBSTETRICS AND GYNECOLOGY | Facility: CLINIC | Age: 44
End: 2019-12-16

## 2019-12-16 DIAGNOSIS — K86.89 PANCREATIC INSUFFICIENCY: ICD-10-CM

## 2019-12-16 DIAGNOSIS — E84.9 CYSTIC FIBROSIS: Primary | ICD-10-CM

## 2019-12-16 RX ORDER — PANCRELIPASE LIPASE, PANCRELIPASE PROTEASE, PANCRELIPASE AMYLASE 20000; 63000; 84000 [USP'U]/1; [USP'U]/1; [USP'U]/1
7 CAPSULE, DELAYED RELEASE ORAL
Qty: 630 CAPSULE | Refills: 11 | Status: CANCELLED | OUTPATIENT
Start: 2019-12-16 | End: 2020-01-15

## 2019-12-16 NOTE — TELEPHONE ENCOUNTER
12/20/19. Hard copy of prescription received from pharmacy. MD signed, returned via fax.    Received refill request from [facility], for Zenpep [id number] units cpe #630, take 7 cap oral three times daily with meals, 11 refills.

## 2019-12-17 ENCOUNTER — PATIENT OUTREACH (OUTPATIENT)
Dept: ADMINISTRATIVE | Facility: OTHER | Age: 44
End: 2019-12-17

## 2019-12-19 ENCOUNTER — PATIENT MESSAGE (OUTPATIENT)
Dept: INTERNAL MEDICINE | Facility: CLINIC | Age: 44
End: 2019-12-19

## 2019-12-19 DIAGNOSIS — D64.9 ANEMIA, UNSPECIFIED TYPE: Primary | ICD-10-CM

## 2019-12-19 DIAGNOSIS — K62.5 RECTAL BLEEDING: ICD-10-CM

## 2019-12-20 ENCOUNTER — OFFICE VISIT (OUTPATIENT)
Dept: GASTROENTEROLOGY | Facility: CLINIC | Age: 44
End: 2019-12-20
Payer: COMMERCIAL

## 2019-12-20 ENCOUNTER — TELEPHONE (OUTPATIENT)
Dept: GASTROENTEROLOGY | Facility: CLINIC | Age: 44
End: 2019-12-20

## 2019-12-20 ENCOUNTER — LAB VISIT (OUTPATIENT)
Dept: LAB | Facility: HOSPITAL | Age: 44
End: 2019-12-20
Payer: COMMERCIAL

## 2019-12-20 VITALS
HEART RATE: 71 BPM | BODY MASS INDEX: 23.14 KG/M2 | WEIGHT: 122.56 LBS | HEIGHT: 61 IN | DIASTOLIC BLOOD PRESSURE: 61 MMHG | SYSTOLIC BLOOD PRESSURE: 96 MMHG

## 2019-12-20 DIAGNOSIS — R10.13 EPIGASTRIC ABDOMINAL PAIN: Primary | ICD-10-CM

## 2019-12-20 DIAGNOSIS — D50.9 IRON DEFICIENCY ANEMIA, UNSPECIFIED IRON DEFICIENCY ANEMIA TYPE: ICD-10-CM

## 2019-12-20 PROBLEM — D50.0 IRON DEFICIENCY ANEMIA DUE TO CHRONIC BLOOD LOSS: Status: ACTIVE | Noted: 2019-12-20

## 2019-12-20 LAB
BASOPHILS # BLD AUTO: 0.08 K/UL (ref 0–0.2)
BASOPHILS NFR BLD: 1.6 % (ref 0–1.9)
DIFFERENTIAL METHOD: ABNORMAL
EOSINOPHIL # BLD AUTO: 0.1 K/UL (ref 0–0.5)
EOSINOPHIL NFR BLD: 2.8 % (ref 0–8)
ERYTHROCYTE [DISTWIDTH] IN BLOOD BY AUTOMATED COUNT: 21.3 % (ref 11.5–14.5)
HCT VFR BLD AUTO: 31.9 % (ref 37–48.5)
HGB BLD-MCNC: 9.1 G/DL (ref 12–16)
IMM GRANULOCYTES # BLD AUTO: 0.03 K/UL (ref 0–0.04)
IMM GRANULOCYTES NFR BLD AUTO: 0.6 % (ref 0–0.5)
LYMPHOCYTES # BLD AUTO: 1.5 K/UL (ref 1–4.8)
LYMPHOCYTES NFR BLD: 30 % (ref 18–48)
MCH RBC QN AUTO: 22.5 PG (ref 27–31)
MCHC RBC AUTO-ENTMCNC: 28.5 G/DL (ref 32–36)
MCV RBC AUTO: 79 FL (ref 82–98)
MONOCYTES # BLD AUTO: 0.3 K/UL (ref 0.3–1)
MONOCYTES NFR BLD: 6.7 % (ref 4–15)
NEUTROPHILS # BLD AUTO: 3 K/UL (ref 1.8–7.7)
NEUTROPHILS NFR BLD: 58.3 % (ref 38–73)
NRBC BLD-RTO: 0 /100 WBC
PLATELET # BLD AUTO: 355 K/UL (ref 150–350)
PMV BLD AUTO: 9.1 FL (ref 9.2–12.9)
RBC # BLD AUTO: 4.04 M/UL (ref 4–5.4)
WBC # BLD AUTO: 5.07 K/UL (ref 3.9–12.7)

## 2019-12-20 PROCEDURE — 3008F PR BODY MASS INDEX (BMI) DOCUMENTED: ICD-10-PCS | Mod: CPTII,S$GLB,, | Performed by: NURSE PRACTITIONER

## 2019-12-20 PROCEDURE — 99215 OFFICE O/P EST HI 40 MIN: CPT | Mod: S$GLB,,, | Performed by: NURSE PRACTITIONER

## 2019-12-20 PROCEDURE — 99999 PR PBB SHADOW E&M-EST. PATIENT-LVL III: ICD-10-PCS | Mod: PBBFAC,,, | Performed by: NURSE PRACTITIONER

## 2019-12-20 PROCEDURE — 99999 PR PBB SHADOW E&M-EST. PATIENT-LVL III: CPT | Mod: PBBFAC,,, | Performed by: NURSE PRACTITIONER

## 2019-12-20 PROCEDURE — 85025 COMPLETE CBC W/AUTO DIFF WBC: CPT

## 2019-12-20 PROCEDURE — 36415 COLL VENOUS BLD VENIPUNCTURE: CPT

## 2019-12-20 PROCEDURE — 3008F BODY MASS INDEX DOCD: CPT | Mod: CPTII,S$GLB,, | Performed by: NURSE PRACTITIONER

## 2019-12-20 PROCEDURE — 99215 PR OFFICE/OUTPT VISIT, EST, LEVL V, 40-54 MIN: ICD-10-PCS | Mod: S$GLB,,, | Performed by: NURSE PRACTITIONER

## 2019-12-20 RX ORDER — HYOSCYAMINE SULFATE 0.12 MG/1
0.12 TABLET SUBLINGUAL
Qty: 90 TABLET | Refills: 2 | Status: SHIPPED | OUTPATIENT
Start: 2019-12-20 | End: 2020-02-13

## 2019-12-20 NOTE — TELEPHONE ENCOUNTER
A spoke with pt to give test results per Jyoti. Pt verbalized understanding. Pt was also told she is to take a iron to bid and Vit C as discussed in clinic. Pt verbalized understanding and thanked MA.       Message from Jyoti Olmos NP sent at 12/20/2019 11:08 AM CST -----  Her HgB has improved , which is great. It is still low though, so she still needs to start her iron to BID with Vit C as discussed in Clinic

## 2019-12-20 NOTE — PROGRESS NOTES
[facility] Consultation Note    Reason for Consult:  The primary encounter diagnosis was Epigastric abdominal pain. A diagnosis of Iron deficiency anemia, unspecified iron deficiency anemia type was also pertinent to this visit.    PCP:   Ellis Self MD:  No referring provider defined for this encounter.    HPI:  This is a 44 y.o. female with a history of CF here for evaluation of rectal bleeding. She is an established pt of Dr. Onofre at [facility]. She presents today for >1yr history of rectal bleeding intermittently with bowel movements. She provided a picture of soft brown stool with bright red blood in the toilet bowl. She denies painful bowel movements. She has anywhere from 1-4 bowel movements per day depending on if she has taken her pancreatic enzymes or not. She also states she can go some days without a BM and reports this as constipation. She was seen in ED and followed up with PCP for LUCHO anemia. Last H/H 8.1/27.9. She was taking po iron but ran out about a week ago and just restarted it. She is not taking Vit. C. She reports epigastric abdominal pain worse with meals and intermittent dysphagia of food. For this she takes pepto BID and gas-x prn.         ROS:  Constitutional: No fevers, chills, No weight loss, + fatigue  ENT: No allergies  CV: No chest pain  Pulm: No cough, + shortness of breath with activity  Ophtho: No vision changes  GI: see HPI  Derm: No rash  Heme: No lymphadenopathy, No bruising  MSK: No arthritis  : No dysuria, No hematuria  Endo: No hot or cold intolerance  Neuro: No syncope, No seizure  Psych: No anxiety, No depression    Medical History:  has a past medical history of Abnormal Pap smear of vagina, Bilateral carpal tunnel syndrome, Cystic fibrosis, Cystic fibrosis, Diabetes mellitus, and Psoriasis.    Surgical History:  has a past surgical history that includes Adenoidectomy; Sinus surgery; Esophagogastroduodenoscopy (N/A,  12/18/2018); and Colonoscopy (N/A, 12/18/2018).    Family History: family history includes Breast cancer in her maternal grandmother; Diabetes in her mother; Heart disease in her father..     Social History:  reports that she has never smoked. She has never used smokeless tobacco. She reports that she drank about 2.0 standard drinks of alcohol per week. She reports that she does not use drugs.    Review of patient's allergies indicates:   Allergen Reactions    Milk containing products Diarrhea    Penicillin     Strawberry        Current Outpatient Medications on File Prior to Visit   Medication Sig Dispense Refill    acetaminophen (TYLENOL ORAL) Take by mouth.      ACZONE 7.5 % GlwP Apply 1 application topically once daily.      alprazolam (XANAX) 0.5 MG tablet Take 0.5 mg by mouth 2 (two) times daily as needed.       bismuth subsalicylate (PINK BISMUTH ORAL) Take by mouth.      blood sugar diagnostic Strp To check BG 10 times daily, to use with insurance preferred meter 400 strip 3    blood-glucose meter kit To check BG 10  times daily, to use with insurance preferred meter 1 each 0    calcium carbonate (TUMS ORAL) Take by mouth.      calcium glycerophosphate (PRELIEF ORAL) Take by mouth.      dextroamphetamine-amphetamine (ADDERALL XR) 15 MG 24 hr capsule Take 15 mg by mouth every morning.      dextroamphetamine-amphetamine (ADDERALL XR) 20 MG 24 hr capsule Take 20 mg by mouth every morning.       dextroamphetamine-amphetamine (ADDERALL) 10 mg Tab Take 5-10 mg by mouth once daily. Daily in the afternoons      ferrous sulfate 325 (65 FE) MG EC tablet Take 325 mg by mouth once daily.      FIASP FLEXTOUCH U-100 INSULIN 100 unit/mL (3 mL) InPn INJECT 10 UNITS INTO THE SKIN THREE TIMES DAILY BEFORE MEALS 15 mL 1    fluticasone propionate (FLONASE) 50 mcg/actuation nasal spray 2 sprays (100 mcg total) by Each Nare route once daily. 1 Bottle 3    lancets Misc To check BG 10 times daily, to use with  "insurance preferred meter 400 each 3    levocetirizine (XYZAL) 5 MG tablet Take 5 mg by mouth daily as needed.       lipase-protease-amylase (ZENPEP) 40,000-126,000- 168,000 unit CpDR Take 4 capsules by mouth 3 (three) times daily with meals. 400 capsule 0    lipase-protease-amylase 20,000-68,000 -109,000 unit CpDR Take 7 capsules by mouth 3 (three) times daily with meals. 630 capsule 12    multivit-minerals/folic acid (ONE-A-DAY WOMEN VITACRAVES ORAL) Take 1 Dose by mouth once daily.      NOVOFINE PLUS 32 gauge x 1/6" Ndle       sertraline (ZOLOFT) 100 MG tablet Take 100 mg by mouth 2 (two) times daily.       simethicone (GAS-X ORAL) Take by mouth.      traZODone (DESYREL) 100 MG tablet Take 1 tablet by mouth nightly as needed.      TRIKAFTA 100-50-75 mg(d) /150 mg (n) TbSQ Take 1 capsule by mouth 2 (two) times daily.      XOPENEX HFA 45 mcg/actuation inhaler Inhale 1-2 puffs into the lungs every 4 (four) hours as needed for Wheezing. 1 Inhaler 12    ZENPEP 20,000-63,000- 84,000 unit CpDR   0    cholecalciferol, vitamin D3, (VITAMIN D3) 2,000 unit Cap Take 2 capsules by mouth once daily.      HYPER-SAL 7 % nebulizer solution Take 4 mLs by nebulization 2 (two) times daily. (Patient not taking: Reported on 12/20/2019) 240 mL 12    multivit,min52-folic-vitK-cQ10 (AQUADEKS) 100-700-10 mcg-mcg-mg Cap cap Take 1 capsule by mouth once daily. 30 capsule 12    nebulizer accessories (ERAPID NEBULIZER HANDSET) Misc 2 each by Misc.(Non-Drug; Combo Route) route 3 (three) times daily. (Patient not taking: Reported on 12/20/2019) 2 each 0    ONABOTULINUMTOXINA (BOTOX COSMETIC INJ) Inject as directed.      pantoprazole (PROTONIX) 40 MG tablet Take 1 tablet (40 mg total) by mouth once daily. (Patient not taking: Reported on 12/20/2019) 30 tablet 0    TAZORAC 0.05 % Crea cream        No current facility-administered medications on file prior to visit.          Objective Findings:    Vital Signs:  Blood Pressure " "96/61 (BP Location: Right arm)   Pulse 71   Height 5' 1" (1.549 m)   Weight 55.6 kg (122 lb 9.2 oz)   Last Menstrual Period 12/05/2019   Body Mass Index 23.16 kg/m²   Body mass index is 23.16 kg/m².    Physical Exam:  General Appearance: Well appearing in no acute distress  Head:   Normocephalic, without obvious abnormality  Eyes:    No scleral icterus  ENT: Neck supple  Lungs: CTA bilaterally in anterior and posterior fields, no wheezes, no crackles.  Heart:  Regular rate and rhythm, S1, S2 normal, no murmurs heard  Abdomen: Soft, non tender, non distended with positive bowel sounds in all four quadrants. No hepatosplenomegaly, ascites, or mass  Extremities: No edema  Skin: No rash  Neurologic: AAO x 3  LORNE: posterior midline skin tag, boggy internal exam      Labs:  Lab Results   Component Value Date    WBC 8.66 12/05/2019    HGB 8.1 (L) 12/05/2019    HCT 27.9 (L) 12/05/2019     (H) 12/05/2019    CRP 0.1 09/05/2019    CHOL 143 03/21/2019    TRIG 42 03/21/2019    HDL 54 03/21/2019    ALT 25 12/05/2019    AST 24 12/05/2019     12/05/2019    K 4.1 12/05/2019     12/05/2019    CREATININE 0.7 12/05/2019    BUN 12 12/05/2019    CO2 29 12/05/2019    TSH 0.954 12/12/2019    INR 1.0 09/08/2012    HGBA1C 6.4 (H) 12/03/2019       Imaging reviewed:    Endoscopy: reviewed    12/18/18 EGD  - Normal esophagus.                        - Gastritis. Biopsied.                        - Erythematous duodenopathy. Biopsied.    12/18/18 Colonoscopy  - One 2 mm polyp in the ascending colon, removed                         with a cold biopsy forceps. Resected and retrieved.                        - Internal hemorrhoids.                        - Perianal skin tags found on perianal exam.    Path: 1 Duodenal biopsy:  - Duodenum mucosa with intact villous architecture.  2 Gastric biopsy:  - Gastric antral and oxyntic type mucosa with mild nonspecific chronic inflammation and reactive change.  - No H. pylori seen on H&E " and immunostain, positive control examined.  3 Ascending colon polyp, biopsy:  - Tubular adenoma.    Assessment:  1. Epigastric abdominal pain    2. Iron deficiency anemia, unspecified iron deficiency anemia type         Pt presents today for rectal bleeding. Established pt with Dr. Onofre. Appt with Dr. Onofre canceled yesterday because she showed up late. Appt rescheduled for January. Per his last clinic note, possible GES for upper abdominal pain and dysphagia. I discussed with pt since she has a f/u appt with him that diagnostic testing will be completed by [facility].  This rectal bleeding has happened intermittently for the past year. Last hemoglobin 8.1. She is very concerned about increasing pancreatic enzymes, but states she occasionally has constipation, and photo provided of soft formed stool. Reassured pt her current dose is appropriate. Levsin before meals ordered for abdominal pain.   CBC ordered today and instructed pt that if it has decreased the recommendation will be for her to go to the ED for a blood transfusion. This concerned her because she was raised a [place], but does not practice at this time. She is unsure if she would accept a blood transfusion but would be open to an iron infusion.   She has an appt scheduled with [facility] on 12/30 for consult for internal hemorrhoids. This is a likely cause of her bleeding.   Instructed pt to increase po iron to BID and to take Vit. C with it to aid in increased absorption.     Recommendations:  1. CBC - if anemia is worse will encourage pt to go to ER  2. PO iron BID with Vit. C  3.Levsin 30 min prior to meals  4. CRS appt 12/30  5. GI appt in January with Dr. Onofre at [place]      Order summary:  Orders Placed This Encounter    CBC auto differential    hyoscyamine (LEVSIN/SL) 0.125 mg Subl       REQUIRES 40 MINUTES TOTAL FACE TO FACE >50% SPENT IN COUNSELING AND COORDINATION OF CARE     Thank you for allowing me to participate in the care of  Olinda Olmos, FNP-C

## 2019-12-20 NOTE — PATIENT INSTRUCTIONS
Follow up with Dr. Onofre for the follow up for your Gi symptoms.    Start taking oral iron twice a day with a vitamin C supplement.

## 2019-12-20 NOTE — PROGRESS NOTES
Her HgB has improved , which is great. It is still low though, so she still needs to start her iron to BID with Vit C as discussed in Clinic

## 2019-12-23 ENCOUNTER — PATIENT MESSAGE (OUTPATIENT)
Dept: ENDOCRINOLOGY | Facility: CLINIC | Age: 44
End: 2019-12-23

## 2019-12-26 ENCOUNTER — PATIENT OUTREACH (OUTPATIENT)
Dept: ADMINISTRATIVE | Facility: OTHER | Age: 44
End: 2019-12-26

## 2019-12-31 ENCOUNTER — PATIENT MESSAGE (OUTPATIENT)
Dept: GASTROENTEROLOGY | Facility: CLINIC | Age: 44
End: 2019-12-31

## 2019-12-31 ENCOUNTER — PATIENT MESSAGE (OUTPATIENT)
Dept: TRANSPLANT | Facility: CLINIC | Age: 44
End: 2019-12-31

## 2020-01-01 ENCOUNTER — TELEPHONE (OUTPATIENT)
Dept: GASTROENTEROLOGY | Facility: HOSPITAL | Age: 45
End: 2020-01-01

## 2020-01-01 DIAGNOSIS — K64.9 HEMORRHOIDS, UNSPECIFIED HEMORRHOID TYPE: Primary | ICD-10-CM

## 2020-01-01 RX ORDER — HYDROCORTISONE ACETATE 25 MG/1
25 SUPPOSITORY RECTAL 2 TIMES DAILY PRN
Qty: 20 SUPPOSITORY | Refills: 0 | Status: SHIPPED | OUTPATIENT
Start: 2020-01-01 | End: 2020-01-11

## 2020-01-09 ENCOUNTER — PATIENT MESSAGE (OUTPATIENT)
Dept: GASTROENTEROLOGY | Facility: CLINIC | Age: 45
End: 2020-01-09

## 2020-01-09 ENCOUNTER — PATIENT OUTREACH (OUTPATIENT)
Dept: ADMINISTRATIVE | Facility: OTHER | Age: 45
End: 2020-01-09

## 2020-01-10 ENCOUNTER — PATIENT MESSAGE (OUTPATIENT)
Dept: TRANSPLANT | Facility: CLINIC | Age: 45
End: 2020-01-10

## 2020-01-24 ENCOUNTER — PATIENT OUTREACH (OUTPATIENT)
Dept: ADMINISTRATIVE | Facility: OTHER | Age: 45
End: 2020-01-24

## 2020-02-05 DIAGNOSIS — Z79.899 MEDICATION MANAGEMENT: ICD-10-CM

## 2020-02-05 DIAGNOSIS — E84.9 CYSTIC FIBROSIS: Primary | ICD-10-CM

## 2020-02-06 ENCOUNTER — PATIENT OUTREACH (OUTPATIENT)
Dept: ADMINISTRATIVE | Facility: OTHER | Age: 45
End: 2020-02-06

## 2020-02-10 ENCOUNTER — PATIENT MESSAGE (OUTPATIENT)
Dept: GASTROENTEROLOGY | Facility: CLINIC | Age: 45
End: 2020-02-10

## 2020-02-10 ENCOUNTER — OFFICE VISIT (OUTPATIENT)
Dept: GASTROENTEROLOGY | Facility: CLINIC | Age: 45
End: 2020-02-10
Payer: COMMERCIAL

## 2020-02-10 ENCOUNTER — LAB VISIT (OUTPATIENT)
Dept: LAB | Facility: HOSPITAL | Age: 45
End: 2020-02-10
Attending: INTERNAL MEDICINE
Payer: COMMERCIAL

## 2020-02-10 VITALS
BODY MASS INDEX: 22.79 KG/M2 | DIASTOLIC BLOOD PRESSURE: 81 MMHG | SYSTOLIC BLOOD PRESSURE: 128 MMHG | WEIGHT: 120.56 LBS | HEART RATE: 101 BPM

## 2020-02-10 DIAGNOSIS — D50.9 IRON DEFICIENCY ANEMIA, UNSPECIFIED IRON DEFICIENCY ANEMIA TYPE: ICD-10-CM

## 2020-02-10 DIAGNOSIS — K64.9 HEMORRHOIDS, UNSPECIFIED HEMORRHOID TYPE: Primary | ICD-10-CM

## 2020-02-10 LAB
25(OH)D3+25(OH)D2 SERPL-MCNC: 25 NG/ML (ref 30–96)
ALBUMIN SERPL BCP-MCNC: 3.6 G/DL (ref 3.5–5.2)
ALP SERPL-CCNC: 100 U/L (ref 55–135)
ALT SERPL W/O P-5'-P-CCNC: 21 U/L (ref 10–44)
ANION GAP SERPL CALC-SCNC: 9 MMOL/L (ref 8–16)
AST SERPL-CCNC: 22 U/L (ref 10–40)
BASOPHILS # BLD AUTO: 0.05 K/UL (ref 0–0.2)
BASOPHILS NFR BLD: 1.3 % (ref 0–1.9)
BILIRUB SERPL-MCNC: 0.3 MG/DL (ref 0.1–1)
BUN SERPL-MCNC: 11 MG/DL (ref 6–20)
CALCIUM SERPL-MCNC: 9.2 MG/DL (ref 8.7–10.5)
CHLORIDE SERPL-SCNC: 105 MMOL/L (ref 95–110)
CO2 SERPL-SCNC: 24 MMOL/L (ref 23–29)
CREAT SERPL-MCNC: 0.8 MG/DL (ref 0.5–1.4)
DIFFERENTIAL METHOD: ABNORMAL
EOSINOPHIL # BLD AUTO: 0.1 K/UL (ref 0–0.5)
EOSINOPHIL NFR BLD: 3.1 % (ref 0–8)
ERYTHROCYTE [DISTWIDTH] IN BLOOD BY AUTOMATED COUNT: 15.9 % (ref 11.5–14.5)
EST. GFR  (AFRICAN AMERICAN): >60 ML/MIN/1.73 M^2
EST. GFR  (NON AFRICAN AMERICAN): >60 ML/MIN/1.73 M^2
FERRITIN SERPL-MCNC: 10 NG/ML (ref 20–300)
GLUCOSE SERPL-MCNC: 141 MG/DL (ref 70–110)
HCT VFR BLD AUTO: 35.9 % (ref 37–48.5)
HGB BLD-MCNC: 11.1 G/DL (ref 12–16)
IMM GRANULOCYTES # BLD AUTO: 0.02 K/UL (ref 0–0.04)
IMM GRANULOCYTES NFR BLD AUTO: 0.5 % (ref 0–0.5)
IRON SERPL-MCNC: 227 UG/DL (ref 30–160)
LYMPHOCYTES # BLD AUTO: 1.1 K/UL (ref 1–4.8)
LYMPHOCYTES NFR BLD: 27.6 % (ref 18–48)
MCH RBC QN AUTO: 25.4 PG (ref 27–31)
MCHC RBC AUTO-ENTMCNC: 30.9 G/DL (ref 32–36)
MCV RBC AUTO: 82 FL (ref 82–98)
MONOCYTES # BLD AUTO: 0.3 K/UL (ref 0.3–1)
MONOCYTES NFR BLD: 7.9 % (ref 4–15)
NEUTROPHILS # BLD AUTO: 2.3 K/UL (ref 1.8–7.7)
NEUTROPHILS NFR BLD: 59.6 % (ref 38–73)
NRBC BLD-RTO: 0 /100 WBC
PLATELET # BLD AUTO: 312 K/UL (ref 150–350)
PMV BLD AUTO: 9.9 FL (ref 9.2–12.9)
POTASSIUM SERPL-SCNC: 4.1 MMOL/L (ref 3.5–5.1)
PROT SERPL-MCNC: 6.9 G/DL (ref 6–8.4)
RBC # BLD AUTO: 4.37 M/UL (ref 4–5.4)
SATURATED IRON: 49 % (ref 20–50)
SODIUM SERPL-SCNC: 138 MMOL/L (ref 136–145)
TOTAL IRON BINDING CAPACITY: 462 UG/DL (ref 250–450)
TRANSFERRIN SERPL-MCNC: 312 MG/DL (ref 200–375)
WBC # BLD AUTO: 3.92 K/UL (ref 3.9–12.7)

## 2020-02-10 PROCEDURE — 82306 VITAMIN D 25 HYDROXY: CPT

## 2020-02-10 PROCEDURE — 99214 OFFICE O/P EST MOD 30 MIN: CPT | Mod: S$GLB,,, | Performed by: INTERNAL MEDICINE

## 2020-02-10 PROCEDURE — 3008F PR BODY MASS INDEX (BMI) DOCUMENTED: ICD-10-PCS | Mod: CPTII,S$GLB,, | Performed by: INTERNAL MEDICINE

## 2020-02-10 PROCEDURE — 82728 ASSAY OF FERRITIN: CPT

## 2020-02-10 PROCEDURE — 99999 PR PBB SHADOW E&M-EST. PATIENT-LVL III: ICD-10-PCS | Mod: PBBFAC,,, | Performed by: INTERNAL MEDICINE

## 2020-02-10 PROCEDURE — 36415 COLL VENOUS BLD VENIPUNCTURE: CPT

## 2020-02-10 PROCEDURE — 99999 PR PBB SHADOW E&M-EST. PATIENT-LVL III: CPT | Mod: PBBFAC,,, | Performed by: INTERNAL MEDICINE

## 2020-02-10 PROCEDURE — 80053 COMPREHEN METABOLIC PANEL: CPT

## 2020-02-10 PROCEDURE — 99214 PR OFFICE/OUTPT VISIT, EST, LEVL IV, 30-39 MIN: ICD-10-PCS | Mod: S$GLB,,, | Performed by: INTERNAL MEDICINE

## 2020-02-10 PROCEDURE — 3008F BODY MASS INDEX DOCD: CPT | Mod: CPTII,S$GLB,, | Performed by: INTERNAL MEDICINE

## 2020-02-10 PROCEDURE — 83540 ASSAY OF IRON: CPT

## 2020-02-10 PROCEDURE — 85025 COMPLETE CBC W/AUTO DIFF WBC: CPT

## 2020-02-10 NOTE — PROGRESS NOTES
Subjective:       Patient ID: Olinda Lombardi is a 44 y.o. female.    Chief Complaint: Follow-up    This is a 44-year-old female here for a follow-up visit regarding rectal bleeding and anemia.  She is noted to have increasing fatigue and underwent labs noting a hemoglobin of 8, down from a baseline of around 9.  This had been associated with scant hematochezia which had been occurring intermittently between October and December.  Prior colonoscopy did reveal internal hemorrhoids.  The bleeding was occurring several times a week, small volume with bowel movements and no associated anorectal discomfort.  She presented for a follow-up visit and rectal exam did not reveal any anorectal fissure or external lesions. She began taking MiraLax on a daily basis noting improvement in her bowel habits, she is having formed stools with resolution of the bleeding.  She has been on iron as well. Her CF symptoms much improved on Trikafta.     The following portions of the patient's history were reviewed and updated as appropriate: allergies, current medications, past family history, past medical history, past social history, past surgical history and problem list.    HPI  Review of Systems   Constitutional: Negative for appetite change and unexpected weight change.   Respiratory: Negative for apnea and chest tightness.    Cardiovascular: Positive for leg swelling. Negative for chest pain and palpitations.   Gastrointestinal: Negative for abdominal distention and abdominal pain.       Objective:      Physical Exam   Constitutional: She is oriented to person, place, and time. She appears well-developed and well-nourished. No distress.   HENT:   Head: Normocephalic and atraumatic.   Eyes: Conjunctivae are normal. No scleral icterus.   Neck: Normal range of motion. Neck supple. No tracheal deviation present. No thyromegaly present.   Pulmonary/Chest: Effort normal. No respiratory distress.   Abdominal: Soft. She exhibits no  distension. There is no tenderness.   Musculoskeletal:        Right wrist: She exhibits normal range of motion and no tenderness.        Left wrist: She exhibits normal range of motion and no tenderness.   Neurological: She is alert and oriented to person, place, and time.   Skin: Skin is warm and dry. No rash noted. She is not diaphoretic. No erythema.   Psychiatric: She has a normal mood and affect. Her behavior is normal.   Nursing note and vitals reviewed.      Assessment:       1. Hemorrhoids, unspecified hemorrhoid type    2. Iron deficiency anemia, unspecified iron deficiency anemia type        Plan:   1. Continue miralax  2. Check labs today  3. Colorectal apt (missed last one) if bleeding recurs, suspect hemorrhoidal

## 2020-02-13 ENCOUNTER — PATIENT MESSAGE (OUTPATIENT)
Dept: GASTROENTEROLOGY | Facility: CLINIC | Age: 45
End: 2020-02-13

## 2020-02-13 ENCOUNTER — PATIENT OUTREACH (OUTPATIENT)
Dept: ADMINISTRATIVE | Facility: OTHER | Age: 45
End: 2020-02-13

## 2020-02-13 ENCOUNTER — OFFICE VISIT (OUTPATIENT)
Dept: OBSTETRICS AND GYNECOLOGY | Facility: CLINIC | Age: 45
End: 2020-02-13
Attending: OBSTETRICS & GYNECOLOGY
Payer: COMMERCIAL

## 2020-02-13 VITALS
BODY MASS INDEX: 23.14 KG/M2 | WEIGHT: 122.56 LBS | HEIGHT: 61 IN | DIASTOLIC BLOOD PRESSURE: 72 MMHG | SYSTOLIC BLOOD PRESSURE: 120 MMHG

## 2020-02-13 DIAGNOSIS — Z01.419 ENCOUNTER FOR GYNECOLOGICAL EXAMINATION: Primary | ICD-10-CM

## 2020-02-13 DIAGNOSIS — Z12.31 ENCOUNTER FOR SCREENING MAMMOGRAM FOR BREAST CANCER: ICD-10-CM

## 2020-02-13 DIAGNOSIS — Z12.4 PAP SMEAR FOR CERVICAL CANCER SCREENING: ICD-10-CM

## 2020-02-13 DIAGNOSIS — R14.0 ABDOMINAL BLOATING: ICD-10-CM

## 2020-02-13 DIAGNOSIS — Z11.51 ENCOUNTER FOR SCREENING FOR HUMAN PAPILLOMAVIRUS (HPV): ICD-10-CM

## 2020-02-13 PROCEDURE — 87624 HPV HI-RISK TYP POOLED RSLT: CPT

## 2020-02-13 PROCEDURE — 99396 PREV VISIT EST AGE 40-64: CPT | Mod: S$GLB,,, | Performed by: OBSTETRICS & GYNECOLOGY

## 2020-02-13 PROCEDURE — 99999 PR PBB SHADOW E&M-EST. PATIENT-LVL III: ICD-10-PCS | Mod: PBBFAC,,, | Performed by: OBSTETRICS & GYNECOLOGY

## 2020-02-13 PROCEDURE — 99396 PR PREVENTIVE VISIT,EST,40-64: ICD-10-PCS | Mod: S$GLB,,, | Performed by: OBSTETRICS & GYNECOLOGY

## 2020-02-13 PROCEDURE — 99999 PR PBB SHADOW E&M-EST. PATIENT-LVL III: CPT | Mod: PBBFAC,,, | Performed by: OBSTETRICS & GYNECOLOGY

## 2020-02-13 PROCEDURE — 88175 CYTOPATH C/V AUTO FLUID REDO: CPT

## 2020-02-13 RX ORDER — DEXTROAMPHETAMINE SULFATE, DEXTROAMPHETAMINE SACCHARATE, AMPHETAMINE ASPARTATE MONOHYDRATE, AND AMPHETAMINE SULFATE 12.5; 12.5; 12.5; 12.5 MG/1; MG/1; MG/1; MG/1
50 CAPSULE, EXTENDED RELEASE ORAL
COMMUNITY
Start: 2020-02-07 | End: 2023-12-27 | Stop reason: SDUPTHER

## 2020-02-13 RX ORDER — DEXTROAMPHETAMINE SULFATE, DEXTROAMPHETAMINE SACCHARATE, AMPHETAMINE ASPARTATE MONOHYDRATE, AND AMPHETAMINE SULFATE 3.125; 3.125; 3.125; 3.125 MG/1; MG/1; MG/1; MG/1
CAPSULE, EXTENDED RELEASE ORAL
COMMUNITY
Start: 2020-01-22 | End: 2020-02-13

## 2020-02-13 NOTE — PROGRESS NOTES
CC: Well woman exam    Olinda Lombardi is a 44 y.o. female  presents for a well woman exam.  She is established but haven't seen pt in over 3 years.    Patient has chronic constipation.  She feels a fullness where she thinks her right ovary is and is requesting ultrasound    Has iron deficiency anemia improved with iron.  Cycles are regular 5 days but heavy, was having rectal bleeding from hemorrhoids however since she has been treated for the hemorrhoids and iron her hemoglobin is normalized almost.  .  When she is sexually active the use closed interruptus.  Not trying to have children.      Past Medical History:   Diagnosis Date    Abnormal Pap smear of vagina     Bilateral carpal tunnel syndrome     Cystic fibrosis     Cystic fibrosis     Diabetes mellitus     CFRD    Psoriasis        Past Surgical History:   Procedure Laterality Date    ADENOIDECTOMY      COLONOSCOPY N/A 2018    Procedure: COLONOSCOPY;  Surgeon: Ernestina Onofre MD;  Location: [facility];  Service: Endoscopy;  Laterality: N/A;    ESOPHAGOGASTRODUODENOSCOPY N/A 2018    Procedure: ESOPHAGOGASTRODUODENOSCOPY (EGD);  Surgeon: Ernestina Onofre MD;  Location: [facility];  Service: Endoscopy;  Laterality: N/A;    SINUS SURGERY         OB History    Para Term  AB Living   0 0 0 0 0 0   SAB TAB Ectopic Multiple Live Births   0 0 0 0         Family History   Problem Relation Age of Onset    Heart disease Father     Diabetes Mother     Breast cancer Maternal Grandmother     Colon cancer Neg Hx     Ovarian cancer Neg Hx     Celiac disease Neg Hx     Crohn's disease Neg Hx     Esophageal cancer Neg Hx     Inflammatory bowel disease Neg Hx     Irritable bowel syndrome Neg Hx     Liver cancer Neg Hx     Rectal cancer Neg Hx     Stomach cancer Neg Hx     Ulcerative colitis Neg Hx        Social History     Tobacco Use    Smoking status: Never Smoker    Smokeless tobacco: Never Used   Substance  "Use Topics    Alcohol use: Not Currently     Alcohol/week: 2.0 standard drinks     Types: 2 Glasses of wine per week     Comment: 1-2 drinks a week     Drug use: No       /72   Ht 5' 1" (1.549 m)   Wt 55.6 kg (122 lb 9.2 oz)   LMP 01/23/2020 (Exact Date)   BMI 23.16 kg/m²     ROS:  GENERAL: Denies weight gain or weight loss. Feeling well overall.   SKIN: Denies rash or lesions.   HEAD: Denies head injury or headache.   NODES: Denies enlarged lymph nodes.   CHEST: Denies chest pain or shortness of breath.   CARDIOVASCULAR: Denies palpitations or left sided chest pain.   ABDOMEN: No abdominal pain, constipation, diarrhea, nausea, vomiting or rectal bleeding.   URINARY: No frequency, dysuria, hematuria, or burning on urination.  REPRODUCTIVE: See HPI.   BREASTS: The patient performs breast self-examination and denies pain, lumps, or nipple discharge.   HEMATOLOGIC: No easy bruisability or excessive bleeding.  MUSCULOSKELETAL: Denies joint pain or swelling.   NEUROLOGIC: Denies syncope or weakness.   PSYCHIATRIC: Denies depression, anxiety or mood swings.    Physical Exam:    APPEARANCE: Well nourished, well developed, in no acute distress.  AFFECT: WNL, alert and oriented x 3  SKIN: No acne or hirsutism  NECK: Neck symmetric without masses or thyromegaly  NODES: No inguinal, cervical, axillary, or femoral lymph node enlargement  CHEST: Good respiratory effect  ABDOMEN: Soft.  No tenderness or masses.  No hepatosplenomegaly.  No hernias.  BREASTS: Symmetrical, no skin changes or visible lesions.  No palpable masses, nipple discharge bilaterally.  PELVIC: Normal external genitalia without lesions.  Normal hair distribution.  Adequate perineal body, normal urethral meatus.  Vagina moist and well rugated without lesions or discharge.  Cervix pink, without lesions, discharge or tenderness.  No significant cystocele or rectocele.  Bimanual exam shows uterus to be normal size, regular, mobile and nontender.  " Adnexa without masses or tenderness.    EXTREMITIES: No edema.    ASSESSMENT AND PLAN  1. Encounter for gynecological examination     2. Encounter for screening mammogram for breast cancer  Mammo Digital Screening Bilat w/ Calixto   3. Pap smear for cervical cancer screening  Liquid-Based Pap Smear, Screening   4. Encounter for screening for human papillomavirus (HPV)  HPV High Risk Genotypes, PCR   5. Abdominal bloating  US Pelvis Comp with Transvag NON-OB (xpd       Patient was counseled today on A.C.S. Pap guidelines and recommendations for yearly pelvic exams, mammograms and monthly self breast exams; to see her PCP for other health maintenance.     Normal exam today.  Reassurance given.  Patient requesting ultrasound    Follow up in about 1 year (around 2/13/2021).

## 2020-02-19 LAB
HPV HR 12 DNA SPEC QL NAA+PROBE: NEGATIVE
HPV16 AG SPEC QL: NEGATIVE
HPV18 DNA SPEC QL NAA+PROBE: NEGATIVE

## 2020-02-20 ENCOUNTER — PATIENT MESSAGE (OUTPATIENT)
Dept: TRANSPLANT | Facility: CLINIC | Age: 45
End: 2020-02-20

## 2020-02-20 ENCOUNTER — TELEPHONE (OUTPATIENT)
Dept: TRANSPLANT | Facility: CLINIC | Age: 45
End: 2020-02-20

## 2020-02-20 DIAGNOSIS — E84.9 CYSTIC FIBROSIS: ICD-10-CM

## 2020-02-20 DIAGNOSIS — K86.89 PANCREATIC INSUFFICIENCY: ICD-10-CM

## 2020-02-20 NOTE — TELEPHONE ENCOUNTER
Patient chart reviewed, not LFT's done with CMP on 2/10 per Dr. Onofre. 3 month follow up after starting Trikafta, patient to be seen in clinic with follow up labs on 3/17.

## 2020-02-20 NOTE — TELEPHONE ENCOUNTER
----- Message from Delmy Dupont sent at 12/4/2019  4:00 PM CST -----  LFTs after starting Trikafta (11/26/19 start date). 3 month check

## 2020-03-12 ENCOUNTER — PATIENT MESSAGE (OUTPATIENT)
Dept: TRANSPLANT | Facility: CLINIC | Age: 45
End: 2020-03-12

## 2020-03-12 ENCOUNTER — PATIENT MESSAGE (OUTPATIENT)
Dept: GASTROENTEROLOGY | Facility: CLINIC | Age: 45
End: 2020-03-12

## 2020-03-13 DIAGNOSIS — E84.9 CYSTIC FIBROSIS: ICD-10-CM

## 2020-03-13 DIAGNOSIS — K86.89 PANCREATIC INSUFFICIENCY: ICD-10-CM

## 2020-03-13 LAB
FINAL PATHOLOGIC DIAGNOSIS: NORMAL
Lab: NORMAL

## 2020-03-13 RX ORDER — SODIUM CHLORIDE FOR INHALATION 7 %
4 VIAL, NEBULIZER (ML) INHALATION 2 TIMES DAILY
Qty: 240 ML | Refills: 12 | Status: SHIPPED | OUTPATIENT
Start: 2020-03-13 | End: 2021-05-05

## 2020-03-13 RX ORDER — ELEXACAFTOR, TEZACAFTOR, AND IVACAFTOR 100-50-75
1 KIT ORAL 2 TIMES DAILY
Qty: 270 TABLET | Refills: 4 | Status: SHIPPED | OUTPATIENT
Start: 2020-03-13 | End: 2020-09-09

## 2020-03-13 RX ORDER — HYDROCORTISONE ACETATE 25 MG/1
25 SUPPOSITORY RECTAL 2 TIMES DAILY PRN
Qty: 20 SUPPOSITORY | Refills: 0 | Status: SHIPPED | OUTPATIENT
Start: 2020-03-13 | End: 2020-06-03

## 2020-03-13 NOTE — TELEPHONE ENCOUNTER
Message sent via patient portal, patient to be rescheduled. Patient requested medication refills. Sent per patient request to Dr. Pate for review/authorization.

## 2020-03-16 ENCOUNTER — TELEPHONE (OUTPATIENT)
Dept: TRANSPLANT | Facility: CLINIC | Age: 45
End: 2020-03-16

## 2020-03-16 DIAGNOSIS — E84.9 CYSTIC FIBROSIS: Primary | ICD-10-CM

## 2020-03-16 DIAGNOSIS — E84.0 CYSTIC FIBROSIS WITH PULMONARY MANIFESTATIONS: ICD-10-CM

## 2020-03-16 NOTE — TELEPHONE ENCOUNTER
----- Message from Kathryn Ramirez sent at 3/16/2020  8:20 AM CDT -----  Contact: [facility]  Reason; Pt asking for a 90 day supply of pulmozymc Fax sent over    Communication: [phone] opt 1 than 6 ref# [id number]

## 2020-03-19 DIAGNOSIS — E08.9 DIABETES MELLITUS RELATED TO CYSTIC FIBROSIS: ICD-10-CM

## 2020-03-19 DIAGNOSIS — E84.8 DIABETES MELLITUS RELATED TO CYSTIC FIBROSIS: ICD-10-CM

## 2020-03-19 DIAGNOSIS — E84.9 CYSTIC FIBROSIS: ICD-10-CM

## 2020-03-19 DIAGNOSIS — K86.89 PANCREATIC INSUFFICIENCY: ICD-10-CM

## 2020-03-19 RX ORDER — PANCRELIPASE LIPASE, PANCRELIPASE PROTEASE, PANCRELIPASE AMYLASE 40000; 126000; 168000 [USP'U]/1; [USP'U]/1; [USP'U]/1
CAPSULE, DELAYED RELEASE ORAL
Qty: 400 CAPSULE | Refills: 0 | Status: SHIPPED | OUTPATIENT
Start: 2020-03-19 | End: 2020-06-08

## 2020-03-20 RX ORDER — INSULIN ASPART INJECTION 100 [IU]/ML
INJECTION, SOLUTION SUBCUTANEOUS
Qty: 15 ML | Refills: 1 | OUTPATIENT
Start: 2020-03-20

## 2020-03-20 RX ORDER — BLOOD SUGAR DIAGNOSTIC
STRIP MISCELLANEOUS
Qty: 400 STRIP | Refills: 3 | OUTPATIENT
Start: 2020-03-20

## 2020-03-23 ENCOUNTER — PATIENT OUTREACH (OUTPATIENT)
Dept: ADMINISTRATIVE | Facility: OTHER | Age: 45
End: 2020-03-23

## 2020-03-23 ENCOUNTER — PATIENT MESSAGE (OUTPATIENT)
Dept: TRANSPLANT | Facility: CLINIC | Age: 45
End: 2020-03-23

## 2020-03-23 ENCOUNTER — PATIENT MESSAGE (OUTPATIENT)
Dept: ENDOCRINOLOGY | Facility: CLINIC | Age: 45
End: 2020-03-23

## 2020-03-24 ENCOUNTER — PATIENT MESSAGE (OUTPATIENT)
Dept: ENDOCRINOLOGY | Facility: CLINIC | Age: 45
End: 2020-03-24

## 2020-03-24 ENCOUNTER — TELEPHONE (OUTPATIENT)
Dept: ENDOCRINOLOGY | Facility: CLINIC | Age: 45
End: 2020-03-24

## 2020-04-01 ENCOUNTER — PATIENT MESSAGE (OUTPATIENT)
Dept: GASTROENTEROLOGY | Facility: CLINIC | Age: 45
End: 2020-04-01

## 2020-04-01 ENCOUNTER — PATIENT OUTREACH (OUTPATIENT)
Dept: ADMINISTRATIVE | Facility: OTHER | Age: 45
End: 2020-04-01

## 2020-04-02 NOTE — PROGRESS NOTES
Chart reviewed.   Requested updates from Care Everywhere.   updated.  Mammogram previously ordered.

## 2020-04-10 ENCOUNTER — PATIENT MESSAGE (OUTPATIENT)
Dept: UROLOGY | Facility: CLINIC | Age: 45
End: 2020-04-10

## 2020-04-13 ENCOUNTER — PATIENT MESSAGE (OUTPATIENT)
Dept: GASTROENTEROLOGY | Facility: CLINIC | Age: 45
End: 2020-04-13

## 2020-04-13 RX ORDER — SULFAMETHOXAZOLE AND TRIMETHOPRIM 800; 160 MG/1; MG/1
1 TABLET ORAL 2 TIMES DAILY
Qty: 14 TABLET | Refills: 0 | Status: SHIPPED | OUTPATIENT
Start: 2020-04-13 | End: 2020-04-20

## 2020-04-14 ENCOUNTER — PATIENT MESSAGE (OUTPATIENT)
Dept: INTERNAL MEDICINE | Facility: CLINIC | Age: 45
End: 2020-04-14

## 2020-04-16 ENCOUNTER — PATIENT MESSAGE (OUTPATIENT)
Dept: GASTROENTEROLOGY | Facility: CLINIC | Age: 45
End: 2020-04-16

## 2020-04-17 ENCOUNTER — PATIENT MESSAGE (OUTPATIENT)
Dept: GASTROENTEROLOGY | Facility: CLINIC | Age: 45
End: 2020-04-17

## 2020-04-18 ENCOUNTER — PATIENT MESSAGE (OUTPATIENT)
Dept: GASTROENTEROLOGY | Facility: CLINIC | Age: 45
End: 2020-04-18

## 2020-04-23 ENCOUNTER — OFFICE VISIT (OUTPATIENT)
Dept: ENDOCRINOLOGY | Facility: CLINIC | Age: 45
End: 2020-04-23
Payer: COMMERCIAL

## 2020-04-23 ENCOUNTER — PATIENT MESSAGE (OUTPATIENT)
Dept: ENDOCRINOLOGY | Facility: CLINIC | Age: 45
End: 2020-04-23

## 2020-04-23 DIAGNOSIS — E84.8 DIABETES MELLITUS RELATED TO CYSTIC FIBROSIS: ICD-10-CM

## 2020-04-23 DIAGNOSIS — E08.9 DIABETES MELLITUS RELATED TO CYSTIC FIBROSIS: ICD-10-CM

## 2020-04-23 DIAGNOSIS — D50.0 IRON DEFICIENCY ANEMIA DUE TO CHRONIC BLOOD LOSS: ICD-10-CM

## 2020-04-23 DIAGNOSIS — E08.9 DIABETES MELLITUS RELATED TO CYSTIC FIBROSIS: Primary | ICD-10-CM

## 2020-04-23 DIAGNOSIS — R53.83 FATIGUE, UNSPECIFIED TYPE: ICD-10-CM

## 2020-04-23 DIAGNOSIS — E84.8 DIABETES MELLITUS RELATED TO CYSTIC FIBROSIS: Primary | ICD-10-CM

## 2020-04-23 PROCEDURE — 99215 OFFICE O/P EST HI 40 MIN: CPT | Mod: 95,,, | Performed by: INTERNAL MEDICINE

## 2020-04-23 PROCEDURE — 99215 PR OFFICE/OUTPT VISIT, EST, LEVL V, 40-54 MIN: ICD-10-PCS | Mod: 95,,, | Performed by: INTERNAL MEDICINE

## 2020-04-23 RX ORDER — INSULIN ASPART 100 [IU]/ML
10 INJECTION, SOLUTION INTRAVENOUS; SUBCUTANEOUS
Qty: 5 SYRINGE | Refills: 1 | Status: SHIPPED | OUTPATIENT
Start: 2020-04-23 | End: 2020-06-14 | Stop reason: SDUPTHER

## 2020-04-23 NOTE — PROGRESS NOTES
Subjective:      Chief Complaint: Follow-up for CF related diabetes    HPI: Olinda Lombardi is a 45 y.o. female who is here for a follow-up evaluation for Cystic Fibrosis related diabetes (CFRD)    Patient is new to me and was last seen by Dr. Lester on 6/22/2018    Has active history of cystic fibrosis and cystic fibrosis related diabetes.      CFRD diagnosed in 2014/2015. Was previously followed by [facility] endocrinology. Has been on short-acting insulin only with no basal requirements. Lately, she's been having a lot of GI issues along with post-prandial fatigue, and she was wondering if this could be related to her diabetes.  She has been checking her blood glucose numerous times per day, particularly after she eats to make sure that she does not have either hyperglycemia or hypoglycemia.  She reports being on aware of hypoglycemia down into the 60s, but does get symptoms when he goes lower than that.  She had a particularly scary episode where she went down into the 40s, and she has a fear of having additional episodes such as that.  She has been working with her gastroenterologist to undergo testing to figure out the cause of for constipation, and to manage her internal hemorrhoids with hematochezia. She has been adjusting how she takes her pancreatic enzymes and has also started on Culturelle probiotics. She additionally complains of intermittent blurry vision. No polyuria/polydipsia.      Diabetes Complications: hyperglycemia and hypoglycemia     Current Diabetes Regimen:  Fiasp 1-2 units with each meal three times daily - she is fearful of giving higher doses of insulin due to previous history of hypoglycemia.  No basal needs    She is requesting a second prescription for novolog, because she read online that Fiasp has not been working lately for some people. She knows that she will need to take one or the other, but not both novolog and Fiasp.      Patient has been checking her glucose >10 times per day.  The below POC glucose checks were read off her meter over the last 24-36 hours. Reports fasting and pre-meal glucose in the 120s at maximum. Post-prandial increases up to 250s, but mostly in the upper 100s.     3:52A: 145  3:19A: 176  2:51A: 185  8:38P: 84  8:24P: 114  8:00P: 131  7:30P: 158  7:01P: 248  6:41P: 251  5:55P: 197  3:16P: 124  12:59A: 89  12:51A: 202     Current Self Reported Glucoses (checks glucoses over 4 times per day)  Pre meal: majority in the low 100's, occasional hypoglycemia as below     Still gets occasional post prandial hypoglycemia - not as much lately as she was when she was giving higher doses of insulin.    She has been trying to alter her diet to try to combat the GI symptoms.    Diabetes Management Status    Statin: Not taking  ACE/ARB: Not taking    Screening or Prevention Patient's value Goal Complete/Controlled?   HgA1C Testing and Control   Lab Results   Component Value Date    HGBA1C 6.4 (H) 12/03/2019      Annually/Less than 8% Yes   Lipid profile : 03/21/2019 Annually No   LDL control Lab Results   Component Value Date    LDLCALC 80.6 03/21/2019    Annually/Less than 100 mg/dl  Yes   Nephropathy screening No results found for: LABMICR  Lab Results   Component Value Date    PROTEINUA 1+ (A) 09/06/2018    Annually No   Blood pressure BP Readings from Last 1 Encounters:   02/13/20 120/72    Less than 140/90 Yes   Dilated retinal exam Most Recent Eye Exam Date: Not Found Annually Yes   Foot exam   Most Recent Foot Exam Date: Not Found Annually Yes         Reviewed past medical, family, social history and updated as appropriate.    Review of Systems   Constitutional: Positive for fatigue. Negative for fever and unexpected weight change.   Respiratory: Negative for shortness of breath.    Cardiovascular: Negative for chest pain.   Gastrointestinal: Positive for blood in stool and constipation. Negative for abdominal pain.     Objective:     BP Readings from Last 5 Encounters:    02/13/20 120/72   02/10/20 128/81   12/20/19 96/61   12/12/19 116/72   12/05/19 129/79       Physical exam was not performed and vitals were not obtained due to this being a telemedicine (virtual) visit.      Wt Readings from Last 10 Encounters:   02/13/20 1015 55.6 kg (122 lb 9.2 oz)   02/10/20 1113 54.7 kg (120 lb 9.5 oz)   12/20/19 0913 55.6 kg (122 lb 9.2 oz)   12/12/19 1430 54.3 kg (119 lb 11.4 oz)   12/05/19 2119 56.2 kg (124 lb)   12/03/19 1815 56.7 kg (125 lb)   12/03/19 1128 56.4 kg (124 lb 5.4 oz)   10/25/19 1118 53.6 kg (118 lb 2.7 oz)   09/05/19 0930 53.9 kg (118 lb 13.3 oz)   07/18/19 1517 53.1 kg (117 lb 1 oz)       Lab Results   Component Value Date    HGBA1C 6.4 (H) 12/03/2019     Lab Results   Component Value Date    CHOL 143 03/21/2019    HDL 54 03/21/2019    LDLCALC 80.6 03/21/2019    TRIG 42 03/21/2019    CHOLHDL 37.8 03/21/2019     Lab Results   Component Value Date     02/10/2020    K 4.1 02/10/2020     02/10/2020    CO2 24 02/10/2020     (H) 02/10/2020    BUN 11 02/10/2020    CREATININE 0.8 02/10/2020    CALCIUM 9.2 02/10/2020    PROT 6.9 02/10/2020    ALBUMIN 3.6 02/10/2020    BILITOT 0.3 02/10/2020    ALKPHOS 100 02/10/2020    AST 22 02/10/2020    ALT 21 02/10/2020    ANIONGAP 9 02/10/2020    ESTGFRAFRICA >60 02/10/2020    EGFRNONAA >60 02/10/2020    TSH 0.954 12/12/2019      No results found for: MICALBCREAT    Assessment/Plan:     Diabetes mellitus related to cystic fibrosis  Discussed that I do not believe her blood sugars are the cause or are contributing to her postprandial fatigue.  Most of her blood sugars are within goal range, and are not running high enough to where I would expect her to have symptoms.  Reviewed goals of therapy are to get the best control we can without hypoglycemia    Medication changes:   Continue:    1. Fiasp (or novolog) 1 unit for small meal and 2 units for larger meal - inject 5 minutes prior for Fiasp (or 10-15 minutes for  "Novolog).    Recommended POC glucose pre-meals 4 times per day. She is currently checking too often, which will only serve to increase anxiety. I asked her to keep a log of her blood sugars for at least the next week so we can determine if the dosing of insulin is appropriate.  I asked her to indicate how many units of insulin she takes with her meals as well.    Patient requested to switch to novolog to see if it works better for her diabetes. I suggested that the type of insulin was not as important as the dose and timing of injections. She requested that I send a prescription for Novolog to her mail order pharmacy so that she can try it" to see if it makes a difference. She also wanted to make sure the Fiasp prescription was on file so that she could fill it in case Novolog was worse.  I explained that her insurance company may have problems feeling both types of insulin at the same time, but she asked me to send the prescriptions and see which her insurance company will pay for.  She understands that she should only take one or the other, and not both insulins. Rx written for 10 units TID with meals to allow adequate supply; anticipate we may need slightly higher doses than she is currently taking based on SMBG data.     Reviewed patient's current insulin regimen. Clarified proper insulin dose and timing in relation to meals, etc. Insulin injection sites and proper rotation instructed.     Hypoglycemia precautions discussed. Glucagon pen sent to pharmacy.    Close adherence to lifestyle changes recommended.      Eyes: Due for eye exam.  Feet: Due for foot exam at next in person visit  Kidneys: Urine microalbumin/Cr ordered for next visit  HbA1c: Ordered for next visit  Lipids: Ordered for next visit    Lab Results   Component Value Date    HGBA1C 6.4 (H) 12/03/2019    HGBA1C 5.9 (H) 09/18/2018    HGBA1C 6.2 (H) 06/12/2018         Fatigue  Do not suspect hyperglycemia is the cause for her post-prandial fatigue, " but we will work to achieve glycemic control regardless.    Iron deficiency anemia due to chronic blood loss  Likely contributing to fatigue.  Anemia may cause spurious A1c results.      The patient location is: [place], [place]  The chief complaint leading to consultation is: Cystic fibrosis related diabetes  Visit type: Virtual visit with synchronous audio and video  Total time spent with patient: 55 minutes  Each patient to whom he or she provides medical services by telemedicine is:  (1) informed of the relationship between the physician and patient and the respective role of any other health care provider with respect to management of the patient; and (2) notified that he or she may decline to receive medical services by telemedicine and may withdraw from such care at any time.    RTC in 3 months    I spent 55 minutes face-to-face with the patient via virtual visit, over half of the visit was spent on counseling and/or coordinating the care of the patient.    Counseling includes:  Diagnostic results, impressions, recommendations   Prognosis   Risk and benefits of management/treatment options   Instructions for management treatment and or follow-up   Importance of compliance with management   Risk factor reduction   Patient education

## 2020-04-23 NOTE — ASSESSMENT & PLAN NOTE
Do not suspect hyperglycemia is the cause for her post-prandial fatigue, but we will work to achieve glycemic control regardless.

## 2020-04-23 NOTE — ASSESSMENT & PLAN NOTE
"Discussed that I do not believe her blood sugars are the cause or are contributing to her postprandial fatigue.  Most of her blood sugars are within goal range, and are not running high enough to where I would expect her to have symptoms.  Reviewed goals of therapy are to get the best control we can without hypoglycemia    Medication changes:   Continue:    1. Fiasp (or novolog) 1 unit for small meal and 2 units for larger meal - inject 5 minutes prior for Fiasp (or 10-15 minutes for Novolog).    Recommended POC glucose pre-meals 4 times per day. She is currently checking too often, which will only serve to increase anxiety. I asked her to keep a log of her blood sugars for at least the next week so we can determine if the dosing of insulin is appropriate.  I asked her to indicate how many units of insulin she takes with her meals as well.    Patient requested to switch to novolog to see if it works better for her diabetes. I suggested that the type of insulin was not as important as the dose and timing of injections. She requested that I send a prescription for Novolog to her mail order pharmacy so that she can try it" to see if it makes a difference. She also wanted to make sure the Fiasp prescription was on file so that she could fill it in case Novolog was worse.  I explained that her insurance company may have problems feeling both types of insulin at the same time, but she asked me to send the prescriptions and see which her insurance company will pay for.  She understands that she should only take one or the other, and not both insulins. Rx written for 10 units TID with meals to allow adequate supply; anticipate we may need slightly higher doses than she is currently taking based on SMBG data.     Reviewed patient's current insulin regimen. Clarified proper insulin dose and timing in relation to meals, etc. Insulin injection sites and proper rotation instructed.     Hypoglycemia precautions discussed. " Glucagon pen sent to pharmacy.    Close adherence to lifestyle changes recommended.      Eyes: Due for eye exam.  Feet: Due for foot exam at next in person visit  Kidneys: Urine microalbumin/Cr ordered for next visit  HbA1c: Ordered for next visit  Lipids: Ordered for next visit    Lab Results   Component Value Date    HGBA1C 6.4 (H) 12/03/2019    HGBA1C 5.9 (H) 09/18/2018    HGBA1C 6.2 (H) 06/12/2018

## 2020-04-28 ENCOUNTER — PATIENT MESSAGE (OUTPATIENT)
Dept: GASTROENTEROLOGY | Facility: CLINIC | Age: 45
End: 2020-04-28

## 2020-04-30 ENCOUNTER — TELEPHONE (OUTPATIENT)
Dept: TRANSPLANT | Facility: CLINIC | Age: 45
End: 2020-04-30

## 2020-04-30 NOTE — TELEPHONE ENCOUNTER
Spoke with patient about (re-) scheduling consult, after discussion with transplant team today to begin rescheduling patients for consults, follow up, and testing, with prior COVID-19 screening as directed. Patient verbalized understanding and requested to delay f/u until June 18 for established patient visit and testing. Schedule request to , orders per Dr. Pate.  Patient is aware she will see appointment information populate in the patient portal.

## 2020-05-22 ENCOUNTER — PATIENT MESSAGE (OUTPATIENT)
Dept: RHEUMATOLOGY | Facility: CLINIC | Age: 45
End: 2020-05-22

## 2020-06-04 ENCOUNTER — TELEPHONE (OUTPATIENT)
Dept: TRANSPLANT | Facility: CLINIC | Age: 45
End: 2020-06-04

## 2020-06-11 ENCOUNTER — PATIENT MESSAGE (OUTPATIENT)
Dept: ENDOCRINOLOGY | Facility: CLINIC | Age: 45
End: 2020-06-11

## 2020-06-11 DIAGNOSIS — E84.8 DIABETES MELLITUS RELATED TO CYSTIC FIBROSIS: ICD-10-CM

## 2020-06-11 DIAGNOSIS — E08.9 DIABETES MELLITUS RELATED TO CYSTIC FIBROSIS: ICD-10-CM

## 2020-06-12 DIAGNOSIS — E08.9 DIABETES MELLITUS RELATED TO CYSTIC FIBROSIS: ICD-10-CM

## 2020-06-12 DIAGNOSIS — E84.8 DIABETES MELLITUS RELATED TO CYSTIC FIBROSIS: ICD-10-CM

## 2020-06-12 RX ORDER — INSULIN ASPART 100 [IU]/ML
10 INJECTION, SOLUTION INTRAVENOUS; SUBCUTANEOUS
Qty: 5 SYRINGE | Refills: 1 | OUTPATIENT
Start: 2020-06-12 | End: 2021-06-12

## 2020-06-14 RX ORDER — INSULIN ASPART 100 [IU]/ML
10 INJECTION, SOLUTION INTRAVENOUS; SUBCUTANEOUS
Qty: 5 SYRINGE | Refills: 1 | Status: SHIPPED | OUTPATIENT
Start: 2020-06-14 | End: 2021-05-05

## 2020-06-14 RX ORDER — INSULIN ASPART INJECTION 100 [IU]/ML
INJECTION, SOLUTION SUBCUTANEOUS
Qty: 15 ML | Refills: 1 | Status: SHIPPED | OUTPATIENT
Start: 2020-06-14 | End: 2020-10-30 | Stop reason: SDUPTHER

## 2020-06-18 ENCOUNTER — PATIENT MESSAGE (OUTPATIENT)
Dept: ENDOCRINOLOGY | Facility: CLINIC | Age: 45
End: 2020-06-18

## 2020-06-18 DIAGNOSIS — E08.9 DIABETES MELLITUS RELATED TO CYSTIC FIBROSIS: ICD-10-CM

## 2020-06-18 DIAGNOSIS — E84.8 DIABETES MELLITUS RELATED TO CYSTIC FIBROSIS: ICD-10-CM

## 2020-06-29 DIAGNOSIS — Z01.812 PRE-PROCEDURE LAB EXAM: Primary | ICD-10-CM

## 2020-07-15 ENCOUNTER — TELEPHONE (OUTPATIENT)
Dept: TRANSPLANT | Facility: CLINIC | Age: 45
End: 2020-07-15

## 2020-07-15 NOTE — TELEPHONE ENCOUNTER
Contacted patient to discuss scheduling of follow up with Dr. Pate. Notified patient that she will need to establish care with a certified CF center, that [facility] is not. She verbalized understanding. Patient requested to be rescheduled to September with Dr. Pate. She is aware she will need to establish new care provider or follow up with [facility]. Request to .

## 2020-07-21 ENCOUNTER — OFFICE VISIT (OUTPATIENT)
Dept: PSYCHIATRY | Facility: CLINIC | Age: 45
End: 2020-07-21
Payer: COMMERCIAL

## 2020-07-21 DIAGNOSIS — F41.1 GAD (GENERALIZED ANXIETY DISORDER): ICD-10-CM

## 2020-07-21 DIAGNOSIS — Z63.0 MARITAL CONFLICT: ICD-10-CM

## 2020-07-21 DIAGNOSIS — F43.0 ACUTE STRESS DISORDER: Primary | ICD-10-CM

## 2020-07-21 PROCEDURE — 90791 PR PSYCHIATRIC DIAGNOSTIC EVALUATION: ICD-10-PCS | Mod: 95,,,

## 2020-07-21 PROCEDURE — 90791 PSYCH DIAGNOSTIC EVALUATION: CPT | Mod: 95,,,

## 2020-07-21 SDOH — SOCIAL DETERMINANTS OF HEALTH (SDOH): PROBLEMS IN RELATIONSHIP WITH SPOUSE OR PARTNER: Z63.0

## 2020-07-21 NOTE — PROGRESS NOTES
Psychiatry Initial Visit (PhD/LCSW)  Diagnostic Interview - CPT 41836    Pt checked in late due to technical issues and requested to be fit tomorrow for an additional session due to feeling high anxiety and wanting to process them more. LCSW made room for pt to also be seen tomorrow.     Date: 7/21/2020     The patient location is: at home in living room  The chief complaint leading to consultation is: anxiety/marital conflict     Visit type: audiovisual    Face to Face time with patient: 45   50 minutes of total time spent on the encounter, which includes face to face time and non-face to face time preparing to see the patient (I.e., review of tests), Obtaining and/or reviewing separately obtained history, Documenting clinical information in the electronic or other health record, Independently interpreting results (not separately reported) and communicating results to the patient/family/caregiver, or Care coordination (not separately reported).         Each patient to whom he or she provides medical services by telemedicine is:  (1) informed of the relationship between the physician and patient and the respective role of any other health care provider with respect to management of the patient; and (2) notified that he or she may decline to receive medical services by telemedicine and may withdraw from such care at any time.        Referral source: self    Clinical status of patient: Outpatient    Olinda Lombardi, a 45 y.o. female, for initial evaluation visit.  Met with patient.    Chief complaint/reason for encounter: interpersonal issues within marriage, anxiety, stress    History of present illness: Pt is a 44 yo female who presents today via telemedicine [facility]. Pt has a medical dx of CF and reports a hx of anxiety and is currently being followed by psychologist Dr. Ck Ann at outside facility. Pt wishes to establishes services with [facility] in  order to address current marital issues, recent COVID pandemic causing her and spouse to lose work, and heightened generalized anxiety. Pt reports difficulty at home with communication to spouse. Pt states she feels he may have a form of autism that she is now realizing could be the breakdown of both of them losing patience with each other and increased fighting. Pt also states due to COVID both parties are currently now working and have yet to receive unemployment benefits. 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  Pt states spouse previously had a high paying occupation and now it is currently on hold. Pt states will be able to obtain benefits next week and has a house that recently sold and will be able to make her mortgage now. Pt also states to have several rental houses that do not have tenants that she was working on renovating but is now on hold. Pt reports life changes have been causing high stress and anxiety and pt is feeling overwhelmed. At end of session pt felt as though she needed more time to talk and requested an additional appointment tomorrow. SW adjusted schedule to meet with pt in afternoon.     Pain: noncontributory    Symptoms:   · Mood: psychomotor agitation  · Anxiety: excessive anxiety/worry, restlessness/keyed up and irritability  · Substance abuse: denied  · Cognitive functioning: denied  · Health behaviors: noncontributory    Psychiatric history: currently under psychiatric care    Medical history:  Dx of Cystic Fibrosis    Family history of psychiatric illness:   Mother has Bipolar     Social history (marriage, employment, etc.):   -  -employed full time as a relator  -raised in foster care, has relationship with father    Substance use:   Alcohol: occasional   Drugs: none   Tobacco: none   Caffeine: none    Current medications and drug reactions (include OTC, herbal): see medication list     Strengths and liabilities: Strength: Patient accepts  guidance/feedback, Strength: Patient is expressive/articulate., Strength: Patient is motivated for change., Liability: Patient is impulsive.    Current Evaluation:     Mental Status Exam:  General Appearance:  unremarkable, age appropriate   Speech: normal tone, normal rate, normal pitch, normal volume      Level of Cooperation: cooperative      Thought Processes: flight of ideas   Mood: anxious      Thought Content: normal, no suicidality, no homicidality, delusions, or paranoia   Affect: congruent and appropriate   Orientation: Oriented x3   Memory: recent >  intact, remote >  intact   Attention Span & Concentration: intact   Fund of General Knowledge: intact and appropriate to age and level of education   Abstract Reasoning: interpretation of similarities was concrete, interpretation of proverbs was concrete   Judgment & Insight: fair     Language  intact     Diagnostic Impression - Plan:       ICD-10-CM ICD-9-CM   1. Acute stress disorder  F43.0 308.3   2. Marital conflict  Z63.0 V61.10   3. ALFREDA (generalized anxiety disorder)  F41.1 300.02       Plan:individual psychotherapy and continued med management with psychiatrist     Return to Clinic: pt requests tomhernandez, pt scheduled for 2pm    Length of Service (minutes): 45

## 2020-07-22 ENCOUNTER — OFFICE VISIT (OUTPATIENT)
Dept: PSYCHIATRY | Facility: CLINIC | Age: 45
End: 2020-07-22
Payer: COMMERCIAL

## 2020-07-22 DIAGNOSIS — Z63.0 MARITAL CONFLICT: ICD-10-CM

## 2020-07-22 DIAGNOSIS — F41.1 GAD (GENERALIZED ANXIETY DISORDER): ICD-10-CM

## 2020-07-22 DIAGNOSIS — F43.0 ACUTE STRESS DISORDER: Primary | ICD-10-CM

## 2020-07-22 PROCEDURE — 90785 PR INTERACTIVE COMPLEXITY: ICD-10-PCS | Mod: 95,,,

## 2020-07-22 PROCEDURE — 90832 PSYTX W PT 30 MINUTES: CPT | Mod: 95,,,

## 2020-07-22 PROCEDURE — 90832 PR PSYCHOTHERAPY W/PATIENT, 30 MIN: ICD-10-PCS | Mod: 95,,,

## 2020-07-22 PROCEDURE — 90785 PSYTX COMPLEX INTERACTIVE: CPT | Mod: 95,,,

## 2020-07-22 SDOH — SOCIAL DETERMINANTS OF HEALTH (SDOH): PROBLEMS IN RELATIONSHIP WITH SPOUSE OR PARTNER: Z63.0

## 2020-07-22 NOTE — PROGRESS NOTES
Individual Psychotherapy (PhD/LCSW)    7/22/2020  The patient location is: home  The chief complaint leading to consultation is: anxiety, stress, marital issues    Visit type: audiovisual    Face to Face time with patient: 30  40 minutes of total time spent on the encounter, which includes face to face time and non-face to face time preparing to see the patient (eg, review of tests), Obtaining and/or reviewing separately obtained history, Documenting clinical information in the electronic or other health record, Independently interpreting results (not separately reported) and communicating results to the patient/family/caregiver, or Care coordination (not separately reported).         Each patient to whom he or she provides medical services by telemedicine is:  (1) informed of the relationship between the physician and patient and the respective role of any other health care provider with respect to management of the patient; and (2) notified that he or she may decline to receive medical services by telemedicine and may withdraw from such care at any time.    Notes:       Therapeutic Intervention: Met with patient.  Outpatient - Insight oriented psychotherapy 30 min - CPT code 66139, Outpatient - Supportive psychotherapy 30 min - CPT Code 77313 and Outpatient - Interactive psychotherapy 30 min - CPT code 23683    Chief complaint/reason for encounter: anxiety, marital issues, stress     Interval history and content of current session: LESLY noted at 2:15 pt had not yet joined video visit. LCSW contacted pt via phone in case pt was having difficulties with equipment again. Pt answered and reported that she forgot about session (SW contacted pt earlier in the day and discussed time, pt responded back). Pt states was trying to finish up a crisis item with her job and would check in soon. Pt checked in for Telemedicine at 2:28 pm. Pt was distracted and unorganized on conference while trying to multi-task. Call dropped due  to technical issues after 1 minute. LCSW called pt via phone and pt answered, however then stated had another call for work and placed session on hold.      Session started at 2:35pm    Pt presents today after session yesterday. Pt checked in late yesterday due to technical issues and requested to be scheduled for an additional session today as pt felt that she needed the psychotherapy.     Pt continued to discuss current stressors of work versus marriage and reported to feeling overwhelmed, being unable to organize or complete task, and becoming forgetful of important deadlines. Pt reports dx of ADHD and states has difficulty focusing. Pt exhibited psychomotor agitation and flight of ideas. Supportive and Interactive psychotherapy,, anxiety reduction/ behavior modification techniques discussed and provided. Pt receptive to therapy.  Wishes to return in 1-2 weeks       Treatment plan:  · Target symptoms: lack of focus, anxiety , stress  · Why chosen therapy is appropriate versus another modality: relevant to diagnosis, patient responds to this modality, evidence based practice  · Outcome monitoring methods: self-report, observation  · Therapeutic intervention type: insight oriented psychotherapy, behavior modifying psychotherapy, supportive psychotherapy, interactive psychotherapy    Risk parameters:  Patient reports no suicidal ideation  Patient reports no homicidal ideation  Patient reports no self-injurious behavior  Patient reports no violent behavior    Verbal deficits: None    Patient's response to intervention:  The patient's response to intervention is accepting.    Progress toward goals and other mental status changes:  The patient's progress toward goals is fair .    Diagnosis:     ICD-10-CM ICD-9-CM   1. Acute stress disorder  F43.0 308.3   2. Marital conflict  Z63.0 V61.10   3. ALFREDA (generalized anxiety disorder)  F41.1 300.02       Plan:  individual psychotherapy    Return to clinic: 1 week    Length of  Service (minutes): 30

## 2020-07-28 ENCOUNTER — OFFICE VISIT (OUTPATIENT)
Dept: PSYCHIATRY | Facility: CLINIC | Age: 45
End: 2020-07-28
Payer: COMMERCIAL

## 2020-07-28 DIAGNOSIS — F41.1 GAD (GENERALIZED ANXIETY DISORDER): ICD-10-CM

## 2020-07-28 DIAGNOSIS — F90.1 ADHD, HYPERACTIVE-IMPULSIVE TYPE: ICD-10-CM

## 2020-07-28 DIAGNOSIS — F43.0 ACUTE STRESS DISORDER: Primary | ICD-10-CM

## 2020-07-28 PROBLEM — Z63.0 MARITAL STRESS: Status: ACTIVE | Noted: 2020-07-28

## 2020-07-28 PROCEDURE — 90837 PSYTX W PT 60 MINUTES: CPT | Mod: 95,,,

## 2020-07-28 PROCEDURE — 90837 PR PSYCHOTHERAPY W/PATIENT, 60 MIN: ICD-10-PCS | Mod: 95,,,

## 2020-07-28 NOTE — PROGRESS NOTES
Individual Psychotherapy (PhD/LCSW)    7/28/20  The patient location is: home  The chief complaint leading to consultation is: anxiety, stress, marital issues    Visit type: audiovisual    Face to Face time with patient: 60  70 minutes of total time spent on the encounter, which includes face to face time and non-face to face time preparing to see the patient (eg, review of tests), Obtaining and/or reviewing separately obtained history, Documenting clinical information in the electronic or other health record, Independently interpreting results (not separately reported) and communicating results to the patient/family/caregiver, or Care coordination (not separately reported).       Each patient to whom he or she provides medical services by telemedicine is:  (1) informed of the relationship between the physician and patient and the respective role of any other health care provider with respect to management of the patient; and (2) notified that he or she may decline to receive medical services by telemedicine and may withdraw from such care at any time.      Therapeutic Intervention: Met with patient.   Outpatient - Insight oriented psychotherapy 60 min - CPT code 69788, Outpatient - Behavior modifying psychotherapy 60 min - CPT code 11750, Outpatient - Supportive psychotherapy 60 min - CPT Code 06156 and Outpatient - Interactive psychotherapy 60 min - CPT code 29556    Chief complaint/reason for encounter: anxiety, marital issues, stress     Interval history and content of current session:  Pt presents today for f/u with LCSW.  Pt states wanting to work towards learning how to focus better in order to complete tasks and feels it is one of her main symptoms of ADHD.  Pt continued to discuss current stressors of work versus marriage and reported to feeling overwhelmed, being unable to organize or complete tasks, and becoming forgetful of important deadlines.   Pt continues to exhibit psychomotor agitation and flight  of ideas during session and needed constant redirection. Organizational and behavior modification tools discussed and exercised. Pt continues to discuss concerns of relationship with spouse. Pt states there is a lack of communication on both ends. SW strongly encouraged pt to meet with a couples counselor as well as individual psychotherapy. Pt not interested at this time in couples counseling. Supportive and Interactive psychotherapy, and behavior modification techniques discussed and provided. Pt receptive to therapy.  Wishes to return in 1 week       Treatment plan:  · Target symptoms: lack of focus, anxiety , stress  · Why chosen therapy is appropriate versus another modality: relevant to diagnosis, patient responds to this modality, evidence based practice  · Outcome monitoring methods: self-report, observation  · Therapeutic intervention type: insight oriented psychotherapy, behavior modifying psychotherapy, supportive psychotherapy, interactive psychotherapy    Risk parameters:  Patient reports no suicidal ideation  Patient reports no homicidal ideation  Patient reports no self-injurious behavior  Patient reports no violent behavior    Verbal deficits: None    Patient's response to intervention:  The patient's response to intervention is accepting.    Progress toward goals and other mental status changes:  The patient's progress toward goals is fair .    Diagnosis:     ICD-10-CM ICD-9-CM   1. Acute stress disorder  F43.0 308.3   2. ALFREDA (generalized anxiety disorder)  F41.1 300.02   3. ADHD, hyperactive-impulsive type  F90.1 314.01       Plan:  individual psychotherapy    Return to clinic: 1 week    Length of Service (minutes): 60

## 2020-08-05 ENCOUNTER — OFFICE VISIT (OUTPATIENT)
Dept: PSYCHIATRY | Facility: CLINIC | Age: 45
End: 2020-08-05
Payer: COMMERCIAL

## 2020-08-05 DIAGNOSIS — F90.1 ADHD, HYPERACTIVE-IMPULSIVE TYPE: ICD-10-CM

## 2020-08-05 DIAGNOSIS — F43.0 ACUTE STRESS DISORDER: Primary | ICD-10-CM

## 2020-08-05 DIAGNOSIS — F41.1 GAD (GENERALIZED ANXIETY DISORDER): ICD-10-CM

## 2020-08-05 PROCEDURE — 90837 PR PSYCHOTHERAPY W/PATIENT, 60 MIN: ICD-10-PCS | Mod: 95,,,

## 2020-08-05 PROCEDURE — 90837 PSYTX W PT 60 MINUTES: CPT | Mod: 95,,,
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
## 2020-08-25 DIAGNOSIS — Z79.899 MEDICATION MANAGEMENT: ICD-10-CM

## 2020-08-25 DIAGNOSIS — E84.9 CYSTIC FIBROSIS: ICD-10-CM

## 2020-08-25 DIAGNOSIS — K86.89 PANCREATIC INSUFFICIENCY: ICD-10-CM

## 2020-08-25 DIAGNOSIS — Z01.812 PRE-PROCEDURE LAB EXAM: Primary | ICD-10-CM

## 2020-08-28 ENCOUNTER — TELEPHONE (OUTPATIENT)
Dept: TRANSPLANT | Facility: CLINIC | Age: 45
End: 2020-08-28

## 2020-09-18 ENCOUNTER — TELEPHONE (OUTPATIENT)
Dept: TRANSPLANT | Facility: CLINIC | Age: 45
End: 2020-09-18

## 2020-09-18 NOTE — TELEPHONE ENCOUNTER
----- Message from Vidya Rasmussen DO sent at 9/2/2020  8:19 AM CDT -----  Regarding: RE: Continue following patient (pulmonary follow up only)?  It is my opinion that she needs to be referred to the [facility] CF center instead of continued follow-up here unless she develops any needs for transplant.  ----- Message -----  From: Herb Fernandez MD  Sent: 9/1/2020  10:06 PM CDT  To: Chadwick Pate MD, #  Subject: RE: Continue following patient (pulmonary fo#    Are we seeing her to send her to another center, namely, a CF center?  ----- Message -----  From: Delmy Dupont  Sent: 9/1/2020   7:05 PM CDT  To: Chadwick Pate MD, #  Subject: Continue following patient (pulmonary follow#    Patient was last seen in December 2019.    Rescheduled follow up in March due to COVID pandemic;   rescheduled in June due to brother being critically ill;   rescheduled in July due to COVID pandemic;   will be rescheduled now in September due to not obtaining requisite COVID testing.    Please advise if patient should be provided with opportunity to reschedule. Patient is aware that the goal of this visit is to transition to a CF center of excellence.     I have sent her a message to contact us to reschedule. But, would like to verify should this occur.     Thank you,   Delmy

## 2020-09-25 ENCOUNTER — TELEPHONE (OUTPATIENT)
Dept: TRANSPLANT | Facility: CLINIC | Age: 45
End: 2020-09-25

## 2020-09-25 NOTE — TELEPHONE ENCOUNTER
----- Message from Dacia Perez sent at 9/25/2020  1:38 PM CDT -----  Pt is calling regarding appt and missed call     Pt# [phone]

## 2020-09-25 NOTE — TELEPHONE ENCOUNTER
"Patient called to report she would like to get labs drawn, but she does not want to have a clinic appt. Patient reports she may have passed a kidney stone recently, that she did have shingles during the pandemic time period. She reports her mother passed away yesterday, and that she was not notified; but that her mother's Yazidi  had medical power of  over any family. Condolences given.  Discussed recommendation for patient to follow up with her primary care provider until she can successfully establish with CF Center at [facility]. She is aware of providers available at [facility] have changed since she last saw them. She reports she is taking some Trikafta still, but "just the orange pills, not the blue pills", but that she has read that "you don't take the blue pills if you have the liver and kidney problems".  Patient is aware that she will need to follow up with her primary care provider and endocrine provider for diabetes follow up. She is aware referral is going to [facility] CF center, for routine follow up of CF and ongoing management of her Trikafta and CF therapy. All questions answered at this time.  "

## 2020-09-28 ENCOUNTER — PATIENT MESSAGE (OUTPATIENT)
Dept: UROLOGY | Facility: CLINIC | Age: 45
End: 2020-09-28

## 2020-09-28 ENCOUNTER — PATIENT MESSAGE (OUTPATIENT)
Dept: INTERNAL MEDICINE | Facility: CLINIC | Age: 45
End: 2020-09-28

## 2020-09-30 RX ORDER — SULFAMETHOXAZOLE AND TRIMETHOPRIM 800; 160 MG/1; MG/1
1 TABLET ORAL 2 TIMES DAILY
Qty: 14 TABLET | Refills: 0 | Status: SHIPPED | OUTPATIENT
Start: 2020-09-30 | End: 2020-10-07

## 2020-10-05 ENCOUNTER — PATIENT MESSAGE (OUTPATIENT)
Dept: ADMINISTRATIVE | Facility: HOSPITAL | Age: 45
End: 2020-10-05

## 2020-10-16 ENCOUNTER — PATIENT MESSAGE (OUTPATIENT)
Dept: TRANSPLANT | Facility: CLINIC | Age: 45
End: 2020-10-16

## 2020-10-16 ENCOUNTER — TELEPHONE (OUTPATIENT)
Dept: TRANSPLANT | Facility: CLINIC | Age: 45
End: 2020-10-16

## 2020-10-16 NOTE — TELEPHONE ENCOUNTER
Received message from [facility] (Dr. Castillo), office is attempting to reach patient for follow up care. Will notify patient of this via [employer] message.

## 2020-10-22 ENCOUNTER — TELEPHONE (OUTPATIENT)
Dept: TRANSPLANT | Facility: CLINIC | Age: 45
End: 2020-10-22

## 2020-10-30 ENCOUNTER — PATIENT MESSAGE (OUTPATIENT)
Dept: ENDOCRINOLOGY | Facility: CLINIC | Age: 45
End: 2020-10-30

## 2020-10-30 DIAGNOSIS — E84.8 DIABETES MELLITUS RELATED TO CYSTIC FIBROSIS: ICD-10-CM

## 2020-10-30 DIAGNOSIS — E08.9 DIABETES MELLITUS RELATED TO CYSTIC FIBROSIS: ICD-10-CM

## 2020-10-30 RX ORDER — INSULIN ASPART INJECTION 100 [IU]/ML
INJECTION, SOLUTION SUBCUTANEOUS
Qty: 15 ML | Refills: 1 | Status: SHIPPED | OUTPATIENT
Start: 2020-10-30 | End: 2021-12-31 | Stop reason: SDUPTHER

## 2020-10-30 RX ORDER — INSULIN ASPART 100 [IU]/ML
INJECTION, SOLUTION INTRAVENOUS; SUBCUTANEOUS
Status: CANCELLED | OUTPATIENT
Start: 2020-10-30

## 2020-10-30 RX ORDER — INSULIN ASPART INJECTION 100 [IU]/ML
INJECTION, SOLUTION SUBCUTANEOUS
Status: CANCELLED | OUTPATIENT
Start: 2020-10-30

## 2020-11-12 ENCOUNTER — DOCUMENTATION ONLY (OUTPATIENT)
Dept: TRANSPLANT | Facility: CLINIC | Age: 45
End: 2020-11-12

## 2020-11-14 ENCOUNTER — NURSE TRIAGE (OUTPATIENT)
Dept: ADMINISTRATIVE | Facility: CLINIC | Age: 45
End: 2020-11-14

## 2020-11-14 ENCOUNTER — HOSPITAL ENCOUNTER (EMERGENCY)
Facility: OTHER | Age: 45
Discharge: HOME | End: 2020-11-14
Attending: EMERGENCY MEDICINE
Payer: COMMERCIAL

## 2020-11-14 VITALS
HEIGHT: 61 IN | WEIGHT: 130 LBS | BODY MASS INDEX: 24.55 KG/M2 | DIASTOLIC BLOOD PRESSURE: 69 MMHG | HEART RATE: 79 BPM | RESPIRATION RATE: 18 BRPM | OXYGEN SATURATION: 99 % | TEMPERATURE: 98 F | SYSTOLIC BLOOD PRESSURE: 118 MMHG

## 2020-11-14 DIAGNOSIS — R06.02 SHORTNESS OF BREATH: ICD-10-CM

## 2020-11-14 DIAGNOSIS — M79.89 LEG SWELLING: ICD-10-CM

## 2020-11-14 DIAGNOSIS — R60.0 MILD PERIPHERAL EDEMA: Primary | ICD-10-CM

## 2020-11-14 LAB
ALBUMIN SERPL BCP-MCNC: 3.7 G/DL (ref 3.5–5.2)
ALP SERPL-CCNC: 88 U/L (ref 55–135)
ALT SERPL W/O P-5'-P-CCNC: 18 U/L (ref 10–44)
ANION GAP SERPL CALC-SCNC: 8 MMOL/L (ref 8–16)
AST SERPL-CCNC: 22 U/L (ref 10–40)
BASOPHILS # BLD AUTO: 0.05 K/UL (ref 0–0.2)
BASOPHILS NFR BLD: 0.9 % (ref 0–1.9)
BILIRUB SERPL-MCNC: 0.4 MG/DL (ref 0.1–1)
BNP SERPL-MCNC: 24 PG/ML (ref 0–99)
BUN SERPL-MCNC: 10 MG/DL (ref 6–20)
CALCIUM SERPL-MCNC: 9.2 MG/DL (ref 8.7–10.5)
CHLORIDE SERPL-SCNC: 106 MMOL/L (ref 95–110)
CO2 SERPL-SCNC: 24 MMOL/L (ref 23–29)
CREAT SERPL-MCNC: 0.7 MG/DL (ref 0.5–1.4)
CTP QC/QA: YES
DIFFERENTIAL METHOD: ABNORMAL
EOSINOPHIL # BLD AUTO: 0.2 K/UL (ref 0–0.5)
EOSINOPHIL NFR BLD: 2.9 % (ref 0–8)
ERYTHROCYTE [DISTWIDTH] IN BLOOD BY AUTOMATED COUNT: 11.7 % (ref 11.5–14.5)
EST. GFR  (AFRICAN AMERICAN): >60 ML/MIN/1.73 M^2
EST. GFR  (NON AFRICAN AMERICAN): >60 ML/MIN/1.73 M^2
GLUCOSE SERPL-MCNC: 110 MG/DL (ref 70–110)
HCT VFR BLD AUTO: 36.5 % (ref 37–48.5)
HGB BLD-MCNC: 12.5 G/DL (ref 12–16)
IMM GRANULOCYTES # BLD AUTO: 0.02 K/UL (ref 0–0.04)
IMM GRANULOCYTES NFR BLD AUTO: 0.4 % (ref 0–0.5)
LYMPHOCYTES # BLD AUTO: 1.4 K/UL (ref 1–4.8)
LYMPHOCYTES NFR BLD: 26.1 % (ref 18–48)
MAGNESIUM SERPL-MCNC: 1.9 MG/DL (ref 1.6–2.6)
MCH RBC QN AUTO: 30.6 PG (ref 27–31)
MCHC RBC AUTO-ENTMCNC: 34.2 G/DL (ref 32–36)
MCV RBC AUTO: 90 FL (ref 82–98)
MONOCYTES # BLD AUTO: 0.3 K/UL (ref 0.3–1)
MONOCYTES NFR BLD: 6 % (ref 4–15)
NEUTROPHILS # BLD AUTO: 3.5 K/UL (ref 1.8–7.7)
NEUTROPHILS NFR BLD: 63.7 % (ref 38–73)
NRBC BLD-RTO: 0 /100 WBC
PLATELET # BLD AUTO: 309 K/UL (ref 150–350)
PMV BLD AUTO: 9.4 FL (ref 9.2–12.9)
POTASSIUM SERPL-SCNC: 4.1 MMOL/L (ref 3.5–5.1)
PROT SERPL-MCNC: 6.9 G/DL (ref 6–8.4)
RBC # BLD AUTO: 4.08 M/UL (ref 4–5.4)
SARS-COV-2 RDRP RESP QL NAA+PROBE: NEGATIVE
SODIUM SERPL-SCNC: 138 MMOL/L (ref 136–145)
TROPONIN I SERPL DL<=0.01 NG/ML-MCNC: <0.006 NG/ML (ref 0–0.03)
WBC # BLD AUTO: 5.52 K/UL (ref 3.9–12.7)

## 2020-11-14 PROCEDURE — 93010 EKG 12-LEAD: ICD-10-PCS | Mod: ,,, | Performed by: INTERNAL MEDICINE

## 2020-11-14 PROCEDURE — U0002 COVID-19 LAB TEST NON-CDC: HCPCS | Performed by: PHYSICIAN ASSISTANT

## 2020-11-14 PROCEDURE — 85025 COMPLETE CBC W/AUTO DIFF WBC: CPT

## 2020-11-14 PROCEDURE — 99285 EMERGENCY DEPT VISIT HI MDM: CPT | Mod: 25

## 2020-11-14 PROCEDURE — 84484 ASSAY OF TROPONIN QUANT: CPT

## 2020-11-14 PROCEDURE — 83880 ASSAY OF NATRIURETIC PEPTIDE: CPT

## 2020-11-14 PROCEDURE — 80053 COMPREHEN METABOLIC PANEL: CPT

## 2020-11-14 PROCEDURE — 83735 ASSAY OF MAGNESIUM: CPT

## 2020-11-14 PROCEDURE — 93005 ELECTROCARDIOGRAM TRACING: CPT

## 2020-11-14 PROCEDURE — 93010 ELECTROCARDIOGRAM REPORT: CPT | Mod: ,,, | Performed by: INTERNAL MEDICINE

## 2020-11-14 NOTE — ED PROVIDER NOTES
Encounter Date: 11/14/2020       History     Chief Complaint   Patient presents with    Shortness of Breath     +Intermittent SOB x1 with BUE and BLE swelling for past several weeks. Pt reports taking new medication for cystic fibrosis, s/s started after taking medication. Pt reports taking OTC fluid pills and using compression stockings without relief.     Extremity Swelling     45-year-old female with history of cystic fibrosis, diabetes, psoriasis presenting to the ER for evaluation of shortness of breath and extremity swelling.  Patient reports she has had progressively worsening lower extremity swelling that she has noticed.  She also noted bilateral upper extremity fullness as well.  Patient denies any chest pain or palpitations.  No cough congestion URI symptoms.  Patient denies prior history of DVT or PE.  No recent long travel or hospitalizations.  She denies prior cardiac history including stent placement.  Patient has not had any contact with COVID positive individuals.  Patient reports that she is on cystic fibrosis medication called Trikafta and is concerned that this medication may have caused her leg swelling.  Patient states she has been on this medication for the last 1 year.  She was being followed by lung transplant doctor Rio but has now transferred care to [facility].    The history is provided by the patient.     Review of patient's allergies indicates:   Allergen Reactions    Milk containing products Diarrhea    Penicillin     [place]      Past Medical History:   Diagnosis Date    Abnormal Pap smear of vagina     Bilateral carpal tunnel syndrome     Cystic fibrosis     Cystic fibrosis     Diabetes mellitus     CFRD    Psoriasis      Past Surgical History:   Procedure Laterality Date    ADENOIDECTOMY      COLONOSCOPY N/A 12/18/2018    Procedure: COLONOSCOPY;  Surgeon: Ernestina Onofre MD;  Location: [facility];  Service: Endoscopy;  Laterality: N/A;     ESOPHAGOGASTRODUODENOSCOPY N/A 12/18/2018    Procedure: ESOPHAGOGASTRODUODENOSCOPY (EGD);  Surgeon: Ernestina Onofre MD;  Location: [facility];  Service: Endoscopy;  Laterality: N/A;    SINUS SURGERY       Family History   Problem Relation Age of Onset    Heart disease Father     Diabetes Mother     Breast cancer Maternal Grandmother     Colon cancer Neg Hx     Ovarian cancer Neg Hx     Celiac disease Neg Hx     Crohn's disease Neg Hx     Esophageal cancer Neg Hx     Inflammatory bowel disease Neg Hx     Irritable bowel syndrome Neg Hx     Liver cancer Neg Hx     Rectal cancer Neg Hx     Stomach cancer Neg Hx     Ulcerative colitis Neg Hx      Social History     Tobacco Use    Smoking status: Never Smoker    Smokeless tobacco: Never Used   Substance Use Topics    Alcohol use: Not Currently     Alcohol/week: 2.0 standard drinks     Types: 2 Glasses of wine per week     Comment: 1-2 drinks a week     Drug use: No     Review of Systems   Constitutional: Negative for chills and fever.   HENT: Negative for congestion.    Respiratory: Positive for shortness of breath. Negative for cough.    Cardiovascular: Positive for leg swelling. Negative for chest pain and palpitations.   Gastrointestinal: Negative for abdominal pain, nausea and vomiting.   Genitourinary: Negative for dysuria and flank pain.   Musculoskeletal: Negative for myalgias.   Skin: Negative for rash.   Allergic/Immunologic: Negative for immunocompromised state.   Neurological: Negative for dizziness, weakness and headaches.   Hematological: Does not bruise/bleed easily.   Psychiatric/Behavioral: Negative for confusion.       Physical Exam     Initial Vitals   BP Pulse Resp Temp SpO2   11/14/20 1546 11/14/20 1546 11/14/20 1546 11/14/20 1834 11/14/20 1546   137/87 103 18 97.7 °F (36.5 °C) 98 %      MAP       --                Physical Exam    Vitals reviewed.  Constitutional: She appears well-developed and well-nourished. She is not  diaphoretic. No distress.   HENT:   Head: Normocephalic and atraumatic.   Eyes: Conjunctivae and EOM are normal.   Neck: Neck supple.   Cardiovascular: Normal rate, regular rhythm, normal heart sounds, intact distal pulses and normal pulses.   Trace edema to bilateral lower extremities   Pulmonary/Chest: Breath sounds normal.   Neurological: She is alert and oriented to person, place, and time.   Skin: Skin is warm.         ED Course   Procedures  Labs Reviewed   CBC W/ AUTO DIFFERENTIAL - Abnormal; Notable for the following components:       Result Value    Hematocrit 36.5 (*)     All other components within normal limits   COMPREHENSIVE METABOLIC PANEL   MAGNESIUM   TROPONIN I   B-TYPE NATRIURETIC PEPTIDE   SARS-COV-2 RDRP GENE          Imaging Results          US Lower Extremity Veins Bilateral (Final result)  Result time 11/14/20 18:09:58    Final result by Stacey Ramsey MD (11/14/20 18:09:58)                 Impression:      No evidence of deep venous thrombosis in either lower extremity.      Electronically signed by: Stacey Ramsey  Date:    11/14/2020  Time:    18:09             Narrative:    EXAMINATION:  ULTRASOUND LOWER EXTREMITY VEINS BILATERAL    CLINICAL HISTORY:  Other specified soft tissue disorders    TECHNIQUE:  Duplex and color flow Doppler and dynamic compression was performed of the bilateral lower extremity veins was performed.    COMPARISON:  None    FINDINGS:  Right thigh veins: The common femoral, femoral, popliteal, upper greater saphenous, and deep femoral veins are patent and free of thrombus. The veins are normally compressible and have normal phasic flow and augmentation response.    Right calf veins: The visualized calf veins are patent.    Left thigh veins: The common femoral, femoral, popliteal, upper greater saphenous, and deep femoral veins are patent and free of thrombus. The veins are normally compressible and have normal phasic flow and augmentation response.    Left calf  veins: The visualized calf veins are patent.    Miscellaneous: None                               X-Ray Chest AP Portable (Final result)  Result time 11/14/20 17:40:17    Final result by Franklyn Gardner MD (11/14/20 17:40:17)                 Impression:      1. No acute cardiopulmonary process.      Electronically signed by: Franklyn Gardner MD  Date:    11/14/2020  Time:    17:40             Narrative:    EXAMINATION:  XR CHEST AP PORTABLE    CLINICAL HISTORY:  Shortness of breath    TECHNIQUE:  Single frontal view of the chest was performed.    COMPARISON:  12/05/2019    FINDINGS:  The cardiomediastinal silhouette is not enlarged.  There is no pleural effusion.  The trachea is midline.  The lungs are symmetrically expanded bilaterally without evidence of acute parenchymal process. No large focal consolidation seen.  There is no pneumothorax.  The osseous structures are remarkable for dextroscoliotic curvature of the spine..                                       APC / Resident Notes:   Patient seen in the ER promptly upon arrival.  She is afebrile, no acute distress.  She shows normal sinus rhythm at a rate of 91 beats per minute.  Heart lung sounds are normal.  Distal pulses intact and equal bilaterally.  Trace edema to bilateral lower extremities.  No edema noted to the upper extremities.  Patient is able to speak in complete full sentences without difficulty.  O2 saturation is 99% on room air.    COVID swab negative.  Laboratory studies show normal white count of 5.5.  Hemoglobin is stable.  Chemistries were found to be unremarkable.  Normal liver and kidney functions.  Cardiac workup was essentially unremarkable.  Magnesium normal 1.9.  BNP unremarkable.  X-ray of chest does not reveal any acute abnormality.  No evidence of pulmonary edema.  Ultrasound of bilateral lower extremity was obtained and found to be unremarkable.  No evidence of acute DVT.    Upon reassessment patient is resting comfortably.  She  has not had any episodes of shortness of breath while in the ED. Patient advised to continue using compression stockings.  Advised repeat ultrasound if symptoms worsened.  Patient is to follow up with her family doctor as well as Rebeca SCOTT.  She was given strict return precautions ED which was agreeable to.  Patient is otherwise stable for discharge and close follow-up at this time. The care of this patient was overseen by attending physician who agrees with treatment, plan, and disposition.                          Clinical Impression:       ICD-10-CM ICD-9-CM   1. Mild peripheral edema  R60.0 782.3   2. Shortness of breath  R06.02 786.05   3. Leg swelling  M79.89 729.81                      Disposition:   Disposition: Discharged  Condition: Stable     ED Disposition Condition    Discharge Stable        ED Prescriptions     None        Follow-up Information     Follow up With Specialties Details Why Contact Info    Ellis Martínez, DO Internal Medicine   [address] [facility] [facility] [address]  [phone]                                         Maria Luz Ulrich PA-C  11/14/20 [id number]

## 2020-11-14 NOTE — TELEPHONE ENCOUNTER
"Olinda called to say both her hands, arms are swollen and tingling.  Now both legs are also swollen, only in the calves, and she is wearing compression stockings for the last two weeks.  She began wearing them on her own, she has not spoken to her provider since the swelling began.  She states she has cystic fibrosis, and this was managed by Dr Pate, but since provider has retired, she will be managed by [facility] transplant but she has not been seen there yet by their lung transplant team.  Of note, she is not on list for lung transplant.  Has DM, does not take insulin regularly or check her glucose regularly.  States she has gained almost 10 pounds, and her legs are very "tight-feeling" , and she is SOB with any exertion. Per [facility] triage protocol, recommend ED now for evaluation.  She states she will decide which ED to go to.  Is aware of 15 minute wait on [facility].  Message to Ellis Martínez DO, pcp. Please contact caller directly with any additional care advice.      Reason for Disposition   [1] Difficulty breathing with exertion (e.g., walking) AND [2] new onset or worsening    Additional Information   Negative: Severe difficulty breathing (e.g., struggling for each breath, speaks in single words)   Negative: Looks like a broken bone or dislocated joint (e.g., crooked or deformed)   Negative: Sounds like a life-threatening emergency to the triager   Negative: Difficulty breathing at rest   Negative: Entire foot is cool or blue in comparison to other side   Negative: [1] Can't walk or can barely walk AND [2] new onset    Protocols used: LEG SWELLING AND EDEMA-A-AH      "

## 2020-11-15 NOTE — DISCHARGE INSTRUCTIONS
Please follow up with your PCP and [place] [facility]. Continue wearing compression stockings for the swelling. Reduce salt intake. Return to the ED if your symptoms worsen.

## 2021-01-04 ENCOUNTER — PATIENT MESSAGE (OUTPATIENT)
Dept: ADMINISTRATIVE | Facility: HOSPITAL | Age: 46
End: 2021-01-04

## 2021-01-05 ENCOUNTER — TELEPHONE (OUTPATIENT)
Dept: TRANSPLANT | Facility: CLINIC | Age: 46
End: 2021-01-05

## 2021-02-23 ENCOUNTER — PATIENT MESSAGE (OUTPATIENT)
Dept: RHEUMATOLOGY | Facility: CLINIC | Age: 46
End: 2021-02-23

## 2021-04-05 ENCOUNTER — PATIENT MESSAGE (OUTPATIENT)
Dept: ADMINISTRATIVE | Facility: HOSPITAL | Age: 46
End: 2021-04-05

## 2021-04-05 DIAGNOSIS — M79.89 LEG SWELLING: Primary | ICD-10-CM

## 2021-04-05 DIAGNOSIS — R05.9 COUGH: ICD-10-CM

## 2021-04-18 ENCOUNTER — PATIENT MESSAGE (OUTPATIENT)
Dept: ADMINISTRATIVE | Facility: HOSPITAL | Age: 46
End: 2021-04-18

## 2021-04-20 ENCOUNTER — TELEPHONE (OUTPATIENT)
Dept: INTERNAL MEDICINE | Facility: CLINIC | Age: 46
End: 2021-04-20

## 2021-04-27 ENCOUNTER — LAB VISIT (OUTPATIENT)
Dept: LAB | Facility: HOSPITAL | Age: 46
End: 2021-04-27
Attending: INTERNAL MEDICINE
Payer: COMMERCIAL

## 2021-04-27 DIAGNOSIS — R05.9 COUGH: ICD-10-CM

## 2021-04-27 PROCEDURE — 36415 COLL VENOUS BLD VENIPUNCTURE: CPT | Mod: PO | Performed by: INTERNAL MEDICINE

## 2021-04-27 PROCEDURE — 86769 SARS-COV-2 COVID-19 ANTIBODY: CPT | Performed by: INTERNAL MEDICINE

## 2021-04-28 ENCOUNTER — NURSE TRIAGE (OUTPATIENT)
Dept: ADMINISTRATIVE | Facility: CLINIC | Age: 46
End: 2021-04-28

## 2021-04-28 ENCOUNTER — PATIENT MESSAGE (OUTPATIENT)
Dept: INTERNAL MEDICINE | Facility: CLINIC | Age: 46
End: 2021-04-28

## 2021-04-28 ENCOUNTER — PATIENT MESSAGE (OUTPATIENT)
Dept: ADMINISTRATIVE | Facility: HOSPITAL | Age: 46
End: 2021-04-28

## 2021-04-28 ENCOUNTER — TELEPHONE (OUTPATIENT)
Dept: INTERNAL MEDICINE | Facility: CLINIC | Age: 46
End: 2021-04-28

## 2021-04-28 DIAGNOSIS — Z78.9 UNKNOWN STATUS OF IMMUNITY TO COVID-19 VIRUS: Primary | ICD-10-CM

## 2021-04-28 DIAGNOSIS — M79.89 LEG SWELLING: ICD-10-CM

## 2021-04-28 LAB — SARS-COV-2 IGG SERPLBLD QL IA.RAPID: NEGATIVE

## 2021-04-29 ENCOUNTER — PATIENT MESSAGE (OUTPATIENT)
Dept: ADMINISTRATIVE | Facility: HOSPITAL | Age: 46
End: 2021-04-29

## 2021-05-04 ENCOUNTER — PATIENT OUTREACH (OUTPATIENT)
Dept: ADMINISTRATIVE | Facility: OTHER | Age: 46
End: 2021-05-04

## 2021-05-04 DIAGNOSIS — Z12.31 ENCOUNTER FOR SCREENING MAMMOGRAM FOR MALIGNANT NEOPLASM OF BREAST: Primary | ICD-10-CM

## 2021-05-05 ENCOUNTER — OFFICE VISIT (OUTPATIENT)
Dept: ALLERGY | Facility: CLINIC | Age: 46
End: 2021-05-05
Payer: COMMERCIAL

## 2021-05-05 ENCOUNTER — LAB VISIT (OUTPATIENT)
Dept: LAB | Facility: HOSPITAL | Age: 46
End: 2021-05-05
Attending: INTERNAL MEDICINE
Payer: COMMERCIAL

## 2021-05-05 VITALS — BODY MASS INDEX: 22.64 KG/M2 | WEIGHT: 119.94 LBS | HEIGHT: 61 IN

## 2021-05-05 DIAGNOSIS — Z78.9 UNKNOWN STATUS OF IMMUNITY TO COVID-19 VIRUS: ICD-10-CM

## 2021-05-05 DIAGNOSIS — Z71.9 COUNSELING, UNSPECIFIED: ICD-10-CM

## 2021-05-05 DIAGNOSIS — Z78.9 UNKNOWN STATUS OF IMMUNITY TO COVID-19 VIRUS: Primary | ICD-10-CM

## 2021-05-05 DIAGNOSIS — R09.81 NASAL CONGESTION: ICD-10-CM

## 2021-05-05 PROCEDURE — 99204 OFFICE O/P NEW MOD 45 MIN: CPT | Mod: S$GLB,,, | Performed by: STUDENT IN AN ORGANIZED HEALTH CARE EDUCATION/TRAINING PROGRAM

## 2021-05-05 PROCEDURE — 30000890 MAYO MISCELLANEOUS TEST (REFLEX): Performed by: STUDENT IN AN ORGANIZED HEALTH CARE EDUCATION/TRAINING PROGRAM

## 2021-05-05 PROCEDURE — 99204 PR OFFICE/OUTPT VISIT, NEW, LEVL IV, 45-59 MIN: ICD-10-PCS | Mod: S$GLB,,, | Performed by: STUDENT IN AN ORGANIZED HEALTH CARE EDUCATION/TRAINING PROGRAM

## 2021-05-05 PROCEDURE — 86769 SARS-COV-2 COVID-19 ANTIBODY: CPT | Performed by: STUDENT IN AN ORGANIZED HEALTH CARE EDUCATION/TRAINING PROGRAM

## 2021-05-05 PROCEDURE — 99999 PR PBB SHADOW E&M-EST. PATIENT-LVL III: ICD-10-PCS | Mod: PBBFAC,,, | Performed by: STUDENT IN AN ORGANIZED HEALTH CARE EDUCATION/TRAINING PROGRAM

## 2021-05-05 PROCEDURE — 99999 PR PBB SHADOW E&M-EST. PATIENT-LVL III: CPT | Mod: PBBFAC,,, | Performed by: STUDENT IN AN ORGANIZED HEALTH CARE EDUCATION/TRAINING PROGRAM

## 2021-05-05 RX ORDER — LANOLIN ALCOHOL/MO/W.PET/CERES
400 CREAM (GRAM) TOPICAL DAILY
COMMUNITY
End: 2021-09-28 | Stop reason: DRUGHIGH

## 2021-05-05 RX ORDER — LANOLIN ALCOHOL/MO/W.PET/CERES
100 CREAM (GRAM) TOPICAL DAILY
COMMUNITY

## 2021-05-06 ENCOUNTER — OFFICE VISIT (OUTPATIENT)
Dept: CARDIOLOGY | Facility: CLINIC | Age: 46
End: 2021-05-06
Payer: COMMERCIAL

## 2021-05-06 VITALS
SYSTOLIC BLOOD PRESSURE: 123 MMHG | WEIGHT: 121.94 LBS | HEIGHT: 61 IN | DIASTOLIC BLOOD PRESSURE: 72 MMHG | OXYGEN SATURATION: 98 % | HEART RATE: 92 BPM | BODY MASS INDEX: 23.02 KG/M2

## 2021-05-06 DIAGNOSIS — I25.110 ATHEROSCLEROSIS OF NATIVE CORONARY ARTERY OF NATIVE HEART WITH UNSTABLE ANGINA PECTORIS: ICD-10-CM

## 2021-05-06 DIAGNOSIS — E84.9 CYSTIC FIBROSIS: ICD-10-CM

## 2021-05-06 DIAGNOSIS — M79.89 LEG SWELLING: Primary | ICD-10-CM

## 2021-05-06 PROCEDURE — 99203 PR OFFICE/OUTPT VISIT, NEW, LEVL III, 30-44 MIN: ICD-10-PCS | Mod: S$GLB,,, | Performed by: INTERNAL MEDICINE

## 2021-05-06 PROCEDURE — 3008F BODY MASS INDEX DOCD: CPT | Mod: CPTII,S$GLB,, | Performed by: INTERNAL MEDICINE

## 2021-05-06 PROCEDURE — 3008F PR BODY MASS INDEX (BMI) DOCUMENTED: ICD-10-PCS | Mod: CPTII,S$GLB,, | Performed by: INTERNAL MEDICINE

## 2021-05-06 PROCEDURE — 99999 PR PBB SHADOW E&M-EST. PATIENT-LVL V: ICD-10-PCS | Mod: PBBFAC,,, | Performed by: INTERNAL MEDICINE

## 2021-05-06 PROCEDURE — 99999 PR PBB SHADOW E&M-EST. PATIENT-LVL V: CPT | Mod: PBBFAC,,, | Performed by: INTERNAL MEDICINE

## 2021-05-06 PROCEDURE — 99203 OFFICE O/P NEW LOW 30 MIN: CPT | Mod: S$GLB,,, | Performed by: INTERNAL MEDICINE

## 2021-05-06 RX ORDER — MAGNESIUM 30 MG
1 TABLET ORAL DAILY
COMMUNITY
End: 2021-05-13

## 2021-05-07 ENCOUNTER — PATIENT MESSAGE (OUTPATIENT)
Dept: ALLERGY | Facility: CLINIC | Age: 46
End: 2021-05-07

## 2021-05-07 LAB — MAYO MISCELLANEOUS RESULT (REF): NORMAL

## 2021-05-10 ENCOUNTER — TELEPHONE (OUTPATIENT)
Dept: RHEUMATOLOGY | Facility: CLINIC | Age: 46
End: 2021-05-10

## 2021-05-13 ENCOUNTER — OFFICE VISIT (OUTPATIENT)
Dept: RHEUMATOLOGY | Facility: CLINIC | Age: 46
End: 2021-05-13
Payer: COMMERCIAL

## 2021-05-13 ENCOUNTER — LAB VISIT (OUTPATIENT)
Dept: LAB | Facility: HOSPITAL | Age: 46
End: 2021-05-13
Attending: INTERNAL MEDICINE
Payer: COMMERCIAL

## 2021-05-13 VITALS
SYSTOLIC BLOOD PRESSURE: 111 MMHG | WEIGHT: 122.56 LBS | DIASTOLIC BLOOD PRESSURE: 68 MMHG | HEART RATE: 71 BPM | HEIGHT: 61 IN | BODY MASS INDEX: 23.14 KG/M2

## 2021-05-13 DIAGNOSIS — E55.9 VITAMIN D INSUFFICIENCY: ICD-10-CM

## 2021-05-13 DIAGNOSIS — M79.89 LEG SWELLING: Primary | ICD-10-CM

## 2021-05-13 DIAGNOSIS — R52 BODY ACHES: ICD-10-CM

## 2021-05-13 DIAGNOSIS — M65.311 TRIGGER THUMB OF BOTH THUMBS: ICD-10-CM

## 2021-05-13 DIAGNOSIS — M25.50 PAIN IN JOINT INVOLVING MULTIPLE SITES: ICD-10-CM

## 2021-05-13 DIAGNOSIS — Z55.9 EDUCATIONAL CIRCUMSTANCE: ICD-10-CM

## 2021-05-13 DIAGNOSIS — M19.041 PRIMARY OSTEOARTHRITIS OF BOTH HANDS: ICD-10-CM

## 2021-05-13 DIAGNOSIS — M19.042 PRIMARY OSTEOARTHRITIS OF BOTH HANDS: ICD-10-CM

## 2021-05-13 DIAGNOSIS — R53.83 FATIGUE, UNSPECIFIED TYPE: ICD-10-CM

## 2021-05-13 DIAGNOSIS — M65.312 TRIGGER THUMB OF BOTH THUMBS: ICD-10-CM

## 2021-05-13 DIAGNOSIS — M79.7 FIBROMYALGIA: ICD-10-CM

## 2021-05-13 DIAGNOSIS — M79.89 LEG SWELLING: ICD-10-CM

## 2021-05-13 LAB
25(OH)D3+25(OH)D2 SERPL-MCNC: 22 NG/ML (ref 30–96)
ALBUMIN SERPL BCP-MCNC: 3.7 G/DL (ref 3.5–5.2)
ALP SERPL-CCNC: 98 U/L (ref 55–135)
ALT SERPL W/O P-5'-P-CCNC: 19 U/L (ref 10–44)
ANION GAP SERPL CALC-SCNC: 10 MMOL/L (ref 8–16)
AST SERPL-CCNC: 19 U/L (ref 10–40)
BASOPHILS # BLD AUTO: 0.04 K/UL (ref 0–0.2)
BASOPHILS NFR BLD: 1 % (ref 0–1.9)
BILIRUB SERPL-MCNC: 0.4 MG/DL (ref 0.1–1)
BUN SERPL-MCNC: 13 MG/DL (ref 6–20)
CALCIUM SERPL-MCNC: 9.7 MG/DL (ref 8.7–10.5)
CHLORIDE SERPL-SCNC: 105 MMOL/L (ref 95–110)
CO2 SERPL-SCNC: 25 MMOL/L (ref 23–29)
CREAT SERPL-MCNC: 0.7 MG/DL (ref 0.5–1.4)
CRP SERPL-MCNC: <0.1 MG/L (ref 0–8.2)
DIFFERENTIAL METHOD: NORMAL
EOSINOPHIL # BLD AUTO: 0.1 K/UL (ref 0–0.5)
EOSINOPHIL NFR BLD: 3.3 % (ref 0–8)
ERYTHROCYTE [DISTWIDTH] IN BLOOD BY AUTOMATED COUNT: 12.2 % (ref 11.5–14.5)
ERYTHROCYTE [SEDIMENTATION RATE] IN BLOOD BY WESTERGREN METHOD: 9 MM/HR (ref 0–36)
EST. GFR  (AFRICAN AMERICAN): >60 ML/MIN/1.73 M^2
EST. GFR  (NON AFRICAN AMERICAN): >60 ML/MIN/1.73 M^2
GLUCOSE SERPL-MCNC: 115 MG/DL (ref 70–110)
HCT VFR BLD AUTO: 38.5 % (ref 37–48.5)
HGB BLD-MCNC: 12.8 G/DL (ref 12–16)
IMM GRANULOCYTES # BLD AUTO: 0.01 K/UL (ref 0–0.04)
IMM GRANULOCYTES NFR BLD AUTO: 0.3 % (ref 0–0.5)
LYMPHOCYTES # BLD AUTO: 1.1 K/UL (ref 1–4.8)
LYMPHOCYTES NFR BLD: 28.1 % (ref 18–48)
MCH RBC QN AUTO: 30.4 PG (ref 27–31)
MCHC RBC AUTO-ENTMCNC: 33.2 G/DL (ref 32–36)
MCV RBC AUTO: 91 FL (ref 82–98)
MONOCYTES # BLD AUTO: 0.4 K/UL (ref 0.3–1)
MONOCYTES NFR BLD: 9 % (ref 4–15)
NEUTROPHILS # BLD AUTO: 2.3 K/UL (ref 1.8–7.7)
NEUTROPHILS NFR BLD: 58.3 % (ref 38–73)
NRBC BLD-RTO: 0 /100 WBC
PLATELET # BLD AUTO: 293 K/UL (ref 150–450)
PMV BLD AUTO: 9.7 FL (ref 9.2–12.9)
POTASSIUM SERPL-SCNC: 4.2 MMOL/L (ref 3.5–5.1)
PROT SERPL-MCNC: 7.3 G/DL (ref 6–8.4)
RBC # BLD AUTO: 4.21 M/UL (ref 4–5.4)
SODIUM SERPL-SCNC: 140 MMOL/L (ref 136–145)
TSH SERPL DL<=0.005 MIU/L-ACNC: 2.47 UIU/ML (ref 0.4–4)
WBC # BLD AUTO: 3.99 K/UL (ref 3.9–12.7)

## 2021-05-13 PROCEDURE — 99214 PR OFFICE/OUTPT VISIT, EST, LEVL IV, 30-39 MIN: ICD-10-PCS | Mod: S$GLB,,, | Performed by: INTERNAL MEDICINE

## 2021-05-13 PROCEDURE — 1125F AMNT PAIN NOTED PAIN PRSNT: CPT | Mod: S$GLB,,, | Performed by: INTERNAL MEDICINE

## 2021-05-13 PROCEDURE — 3008F BODY MASS INDEX DOCD: CPT | Mod: CPTII,S$GLB,, | Performed by: INTERNAL MEDICINE

## 2021-05-13 PROCEDURE — 3008F PR BODY MASS INDEX (BMI) DOCUMENTED: ICD-10-PCS | Mod: CPTII,S$GLB,, | Performed by: INTERNAL MEDICINE

## 2021-05-13 PROCEDURE — 86140 C-REACTIVE PROTEIN: CPT | Performed by: INTERNAL MEDICINE

## 2021-05-13 PROCEDURE — 99999 PR PBB SHADOW E&M-EST. PATIENT-LVL V: CPT | Mod: PBBFAC,,, | Performed by: INTERNAL MEDICINE

## 2021-05-13 PROCEDURE — 99999 PR PBB SHADOW E&M-EST. PATIENT-LVL V: ICD-10-PCS | Mod: PBBFAC,,, | Performed by: INTERNAL MEDICINE

## 2021-05-13 PROCEDURE — 36415 COLL VENOUS BLD VENIPUNCTURE: CPT | Performed by: INTERNAL MEDICINE

## 2021-05-13 PROCEDURE — 82306 VITAMIN D 25 HYDROXY: CPT | Performed by: INTERNAL MEDICINE

## 2021-05-13 PROCEDURE — 84443 ASSAY THYROID STIM HORMONE: CPT | Performed by: INTERNAL MEDICINE

## 2021-05-13 PROCEDURE — 85025 COMPLETE CBC W/AUTO DIFF WBC: CPT | Performed by: INTERNAL MEDICINE

## 2021-05-13 PROCEDURE — 85652 RBC SED RATE AUTOMATED: CPT | Performed by: INTERNAL MEDICINE

## 2021-05-13 PROCEDURE — 1125F PR PAIN SEVERITY QUANTIFIED, PAIN PRESENT: ICD-10-PCS | Mod: S$GLB,,, | Performed by: INTERNAL MEDICINE

## 2021-05-13 PROCEDURE — 99214 OFFICE O/P EST MOD 30 MIN: CPT | Mod: S$GLB,,, | Performed by: INTERNAL MEDICINE

## 2021-05-13 PROCEDURE — 80053 COMPREHEN METABOLIC PANEL: CPT | Performed by: INTERNAL MEDICINE

## 2021-05-13 RX ORDER — ASPIRIN 325 MG
50000 TABLET, DELAYED RELEASE (ENTERIC COATED) ORAL
Qty: 30 CAPSULE | Refills: 0 | Status: SHIPPED | OUTPATIENT
Start: 2021-05-13 | End: 2022-05-05 | Stop reason: ALTCHOICE

## 2021-05-13 SDOH — SOCIAL DETERMINANTS OF HEALTH (SDOH): PROBLEMS RELATED TO EDUCATION AND LITERACY, UNSPECIFIED: Z55.9

## 2021-05-13 ASSESSMENT — ROUTINE ASSESSMENT OF PATIENT INDEX DATA (RAPID3)
TOTAL RAPID3 SCORE: 3.67
FATIGUE SCORE: 5
AM STIFFNESS SCORE: 1, YES
PATIENT GLOBAL ASSESSMENT SCORE: 5
PSYCHOLOGICAL DISTRESS SCORE: 3.3
PAIN SCORE: 4
MDHAQ FUNCTION SCORE: 0.6

## 2021-05-14 ENCOUNTER — PATIENT MESSAGE (OUTPATIENT)
Dept: ALLERGY | Facility: CLINIC | Age: 46
End: 2021-05-14

## 2021-05-18 DIAGNOSIS — M65.311 TRIGGER THUMB OF RIGHT HAND: Primary | ICD-10-CM

## 2021-05-19 DIAGNOSIS — R20.0 NUMBNESS: Primary | ICD-10-CM

## 2021-05-20 ENCOUNTER — TELEPHONE (OUTPATIENT)
Dept: ORTHOPEDICS | Facility: CLINIC | Age: 46
End: 2021-05-20

## 2021-05-21 ENCOUNTER — HOSPITAL ENCOUNTER (OUTPATIENT)
Dept: RADIOLOGY | Facility: OTHER | Age: 46
Discharge: HOME | End: 2021-05-21
Attending: ORTHOPAEDIC SURGERY
Payer: COMMERCIAL

## 2021-05-21 ENCOUNTER — HOSPITAL ENCOUNTER (OUTPATIENT)
Dept: RADIOLOGY | Facility: OTHER | Age: 46
Discharge: HOME | End: 2021-05-21
Attending: PHYSICIAN ASSISTANT
Payer: COMMERCIAL

## 2021-05-21 ENCOUNTER — OFFICE VISIT (OUTPATIENT)
Dept: ORTHOPEDICS | Facility: CLINIC | Age: 46
End: 2021-05-21
Payer: COMMERCIAL

## 2021-05-21 VITALS
WEIGHT: 122 LBS | HEART RATE: 74 BPM | DIASTOLIC BLOOD PRESSURE: 76 MMHG | HEIGHT: 61 IN | SYSTOLIC BLOOD PRESSURE: 115 MMHG | BODY MASS INDEX: 23.03 KG/M2

## 2021-05-21 DIAGNOSIS — M25.512 BILATERAL SHOULDER PAIN, UNSPECIFIED CHRONICITY: ICD-10-CM

## 2021-05-21 DIAGNOSIS — R52 PAIN: Primary | ICD-10-CM

## 2021-05-21 DIAGNOSIS — R20.0 NUMBNESS: ICD-10-CM

## 2021-05-21 DIAGNOSIS — G56.01 CARPAL TUNNEL SYNDROME ON RIGHT: ICD-10-CM

## 2021-05-21 DIAGNOSIS — M25.511 BILATERAL SHOULDER PAIN, UNSPECIFIED CHRONICITY: ICD-10-CM

## 2021-05-21 DIAGNOSIS — M65.311 TRIGGER THUMB OF RIGHT HAND: Primary | ICD-10-CM

## 2021-05-21 DIAGNOSIS — M65.311 TRIGGER THUMB OF RIGHT HAND: ICD-10-CM

## 2021-05-21 DIAGNOSIS — R52 PAIN: ICD-10-CM

## 2021-05-21 PROCEDURE — 73130 X-RAY EXAM OF HAND: CPT | Mod: 26,RT,, | Performed by: RADIOLOGY

## 2021-05-21 PROCEDURE — 3008F PR BODY MASS INDEX (BMI) DOCUMENTED: ICD-10-PCS | Mod: CPTII,S$GLB,, | Performed by: PHYSICIAN ASSISTANT

## 2021-05-21 PROCEDURE — 73030 X-RAY EXAM OF SHOULDER: CPT | Mod: TC,50,FY

## 2021-05-21 PROCEDURE — 73030 X-RAY EXAM OF SHOULDER: CPT | Mod: 26,,, | Performed by: RADIOLOGY

## 2021-05-21 PROCEDURE — 99999 PR PBB SHADOW E&M-EST. PATIENT-LVL V: ICD-10-PCS | Mod: PBBFAC,,, | Performed by: PHYSICIAN ASSISTANT

## 2021-05-21 PROCEDURE — 99999 PR PBB SHADOW E&M-EST. PATIENT-LVL V: CPT | Mod: PBBFAC,,, | Performed by: PHYSICIAN ASSISTANT

## 2021-05-21 PROCEDURE — 99204 PR OFFICE/OUTPT VISIT, NEW, LEVL IV, 45-59 MIN: ICD-10-PCS | Mod: S$GLB,,, | Performed by: PHYSICIAN ASSISTANT

## 2021-05-21 PROCEDURE — 1125F PR PAIN SEVERITY QUANTIFIED, PAIN PRESENT: ICD-10-PCS | Mod: S$GLB,,, | Performed by: PHYSICIAN ASSISTANT

## 2021-05-21 PROCEDURE — 1125F AMNT PAIN NOTED PAIN PRSNT: CPT | Mod: S$GLB,,, | Performed by: PHYSICIAN ASSISTANT

## 2021-05-21 PROCEDURE — 99204 OFFICE O/P NEW MOD 45 MIN: CPT | Mod: S$GLB,,, | Performed by: PHYSICIAN ASSISTANT

## 2021-05-21 PROCEDURE — 73130 XR HAND COMPLETE 3 VIEW LEFT: ICD-10-PCS | Mod: 26,LT,, | Performed by: RADIOLOGY

## 2021-05-21 PROCEDURE — 73130 X-RAY EXAM OF HAND: CPT | Mod: TC,FY,RT

## 2021-05-21 PROCEDURE — 73130 X-RAY EXAM OF HAND: CPT | Mod: TC,FY,LT

## 2021-05-21 PROCEDURE — 3008F BODY MASS INDEX DOCD: CPT | Mod: CPTII,S$GLB,, | Performed by: PHYSICIAN ASSISTANT

## 2021-05-21 PROCEDURE — 73130 X-RAY EXAM OF HAND: CPT | Mod: 26,LT,, | Performed by: RADIOLOGY

## 2021-05-21 PROCEDURE — 73030 XR SHOULDER COMPLETE 2 OR MORE VIEWS BILATERAL: ICD-10-PCS | Mod: 26,,, | Performed by: RADIOLOGY

## 2021-05-27 ENCOUNTER — DOCUMENTATION ONLY (OUTPATIENT)
Dept: ORTHOPEDICS | Facility: CLINIC | Age: 46
End: 2021-05-27

## 2021-07-06 ENCOUNTER — PROCEDURE VISIT (OUTPATIENT)
Dept: NEUROLOGY | Facility: CLINIC | Age: 46
End: 2021-07-06
Payer: COMMERCIAL

## 2021-07-06 ENCOUNTER — PATIENT MESSAGE (OUTPATIENT)
Dept: ADMINISTRATIVE | Facility: HOSPITAL | Age: 46
End: 2021-07-06

## 2021-07-06 DIAGNOSIS — G56.01 CARPAL TUNNEL SYNDROME ON RIGHT: ICD-10-CM

## 2021-07-06 PROCEDURE — 95913 NRV CNDJ TEST 13/> STUDIES: CPT | Mod: S$GLB,,, | Performed by: PSYCHIATRY & NEUROLOGY

## 2021-07-06 PROCEDURE — 95886 PR EMG COMPLETE, W/ NERVE CONDUCTION STUDIES, 5+ MUSCLES: ICD-10-PCS | Mod: S$GLB,,, | Performed by: PSYCHIATRY & NEUROLOGY

## 2021-07-06 PROCEDURE — 95886 MUSC TEST DONE W/N TEST COMP: CPT | Mod: S$GLB,,, | Performed by: PSYCHIATRY & NEUROLOGY

## 2021-07-06 PROCEDURE — 95913 PR NERVE CONDUCTION STUDY; 13 OR MORE STUDIES: ICD-10-PCS | Mod: S$GLB,,, | Performed by: PSYCHIATRY & NEUROLOGY

## 2021-07-09 ENCOUNTER — TELEPHONE (OUTPATIENT)
Dept: ORTHOPEDICS | Facility: CLINIC | Age: 46
End: 2021-07-09

## 2021-07-12 ENCOUNTER — PATIENT OUTREACH (OUTPATIENT)
Dept: ADMINISTRATIVE | Facility: OTHER | Age: 46
End: 2021-07-12

## 2021-07-13 ENCOUNTER — OFFICE VISIT (OUTPATIENT)
Dept: ORTHOPEDICS | Facility: CLINIC | Age: 46
End: 2021-07-13
Payer: COMMERCIAL

## 2021-07-13 VITALS
WEIGHT: 122 LBS | HEIGHT: 61 IN | BODY MASS INDEX: 23.03 KG/M2 | DIASTOLIC BLOOD PRESSURE: 84 MMHG | HEART RATE: 101 BPM | SYSTOLIC BLOOD PRESSURE: 147 MMHG

## 2021-07-13 DIAGNOSIS — G56.03 CARPAL TUNNEL SYNDROME, BILATERAL: Primary | ICD-10-CM

## 2021-07-13 DIAGNOSIS — M65.311 TRIGGER THUMB OF RIGHT HAND: ICD-10-CM

## 2021-07-13 PROCEDURE — 3008F BODY MASS INDEX DOCD: CPT | Mod: CPTII,S$GLB,, | Performed by: ORTHOPAEDIC SURGERY

## 2021-07-13 PROCEDURE — 99999 PR PBB SHADOW E&M-EST. PATIENT-LVL IV: ICD-10-PCS | Mod: PBBFAC,,, | Performed by: ORTHOPAEDIC SURGERY

## 2021-07-13 PROCEDURE — 1125F AMNT PAIN NOTED PAIN PRSNT: CPT | Mod: CPTII,S$GLB,, | Performed by: ORTHOPAEDIC SURGERY

## 2021-07-13 PROCEDURE — 99214 PR OFFICE/OUTPT VISIT, EST, LEVL IV, 30-39 MIN: ICD-10-PCS | Mod: 25,S$GLB,, | Performed by: ORTHOPAEDIC SURGERY

## 2021-07-13 PROCEDURE — 1125F PR PAIN SEVERITY QUANTIFIED, PAIN PRESENT: ICD-10-PCS | Mod: CPTII,S$GLB,, | Performed by: ORTHOPAEDIC SURGERY

## 2021-07-13 PROCEDURE — 20526 CARPAL TUNNEL: ICD-10-PCS | Mod: 50,S$GLB,, | Performed by: ORTHOPAEDIC SURGERY

## 2021-07-13 PROCEDURE — 20526 THER INJECTION CARP TUNNEL: CPT | Mod: 50,S$GLB,, | Performed by: ORTHOPAEDIC SURGERY

## 2021-07-13 PROCEDURE — 99999 PR PBB SHADOW E&M-EST. PATIENT-LVL IV: CPT | Mod: PBBFAC,,, | Performed by: ORTHOPAEDIC SURGERY

## 2021-07-13 PROCEDURE — 3008F PR BODY MASS INDEX (BMI) DOCUMENTED: ICD-10-PCS | Mod: CPTII,S$GLB,, | Performed by: ORTHOPAEDIC SURGERY

## 2021-07-13 PROCEDURE — 99214 OFFICE O/P EST MOD 30 MIN: CPT | Mod: 25,S$GLB,, | Performed by: ORTHOPAEDIC SURGERY

## 2021-07-13 RX ADMIN — DEXAMETHASONE SODIUM PHOSPHATE 4 MG: 4 INJECTION, SOLUTION INTRA-ARTICULAR; INTRALESIONAL; INTRAMUSCULAR; INTRAVENOUS; SOFT TISSUE at 03:07

## 2021-07-20 RX ORDER — DEXAMETHASONE SODIUM PHOSPHATE 4 MG/ML
4 INJECTION, SOLUTION INTRA-ARTICULAR; INTRALESIONAL; INTRAMUSCULAR; INTRAVENOUS; SOFT TISSUE
Status: DISCONTINUED | OUTPATIENT
Start: 2021-07-13 | End: 2021-07-20 | Stop reason: HOSPADM

## 2021-07-30 NOTE — TELEPHONE ENCOUNTER
Referral to heme and colorectal ordered   albuterol (ProAir HFA) 108 (90 Base) MCG/ACT inhaler 8.5 g

## 2021-08-23 ENCOUNTER — TELEPHONE (OUTPATIENT)
Dept: ORTHOPEDICS | Facility: CLINIC | Age: 46
End: 2021-08-23

## 2021-08-24 ENCOUNTER — OFFICE VISIT (OUTPATIENT)
Dept: ORTHOPEDICS | Facility: CLINIC | Age: 46
End: 2021-08-24
Payer: COMMERCIAL

## 2021-08-24 VITALS
BODY MASS INDEX: 23.03 KG/M2 | HEART RATE: 105 BPM | WEIGHT: 122 LBS | HEIGHT: 61 IN | SYSTOLIC BLOOD PRESSURE: 137 MMHG | DIASTOLIC BLOOD PRESSURE: 88 MMHG

## 2021-08-24 DIAGNOSIS — G56.03 CARPAL TUNNEL SYNDROME, BILATERAL: Primary | ICD-10-CM

## 2021-08-24 PROCEDURE — 1159F MED LIST DOCD IN RCRD: CPT | Mod: CPTII,S$GLB,, | Performed by: ORTHOPAEDIC SURGERY

## 2021-08-24 PROCEDURE — 3075F SYST BP GE 130 - 139MM HG: CPT | Mod: CPTII,S$GLB,, | Performed by: ORTHOPAEDIC SURGERY

## 2021-08-24 PROCEDURE — 3008F BODY MASS INDEX DOCD: CPT | Mod: CPTII,S$GLB,, | Performed by: ORTHOPAEDIC SURGERY

## 2021-08-24 PROCEDURE — 20526 THER INJECTION CARP TUNNEL: CPT | Mod: 50,S$GLB,, | Performed by: ORTHOPAEDIC SURGERY

## 2021-08-24 PROCEDURE — 3008F PR BODY MASS INDEX (BMI) DOCUMENTED: ICD-10-PCS | Mod: CPTII,S$GLB,, | Performed by: ORTHOPAEDIC SURGERY

## 2021-08-24 PROCEDURE — 3075F PR MOST RECENT SYSTOLIC BLOOD PRESS GE 130-139MM HG: ICD-10-PCS | Mod: CPTII,S$GLB,, | Performed by: ORTHOPAEDIC SURGERY

## 2021-08-24 PROCEDURE — 3079F PR MOST RECENT DIASTOLIC BLOOD PRESSURE 80-89 MM HG: ICD-10-PCS | Mod: CPTII,S$GLB,, | Performed by: ORTHOPAEDIC SURGERY

## 2021-08-24 PROCEDURE — 3079F DIAST BP 80-89 MM HG: CPT | Mod: CPTII,S$GLB,, | Performed by: ORTHOPAEDIC SURGERY

## 2021-08-24 PROCEDURE — 99999 PR PBB SHADOW E&M-EST. PATIENT-LVL IV: CPT | Mod: PBBFAC,,, | Performed by: ORTHOPAEDIC SURGERY

## 2021-08-24 PROCEDURE — 1159F PR MEDICATION LIST DOCUMENTED IN MEDICAL RECORD: ICD-10-PCS | Mod: CPTII,S$GLB,, | Performed by: ORTHOPAEDIC SURGERY

## 2021-08-24 PROCEDURE — 99214 PR OFFICE/OUTPT VISIT, EST, LEVL IV, 30-39 MIN: ICD-10-PCS | Mod: 25,S$GLB,, | Performed by: ORTHOPAEDIC SURGERY

## 2021-08-24 PROCEDURE — 99214 OFFICE O/P EST MOD 30 MIN: CPT | Mod: 25,S$GLB,, | Performed by: ORTHOPAEDIC SURGERY

## 2021-08-24 PROCEDURE — 20526 CARPAL TUNNEL: ICD-10-PCS | Mod: 50,S$GLB,, | Performed by: ORTHOPAEDIC SURGERY

## 2021-08-24 PROCEDURE — 99999 PR PBB SHADOW E&M-EST. PATIENT-LVL IV: ICD-10-PCS | Mod: PBBFAC,,, | Performed by: ORTHOPAEDIC SURGERY

## 2021-08-24 RX ADMIN — DEXAMETHASONE SODIUM PHOSPHATE 4 MG: 4 INJECTION, SOLUTION INTRA-ARTICULAR; INTRALESIONAL; INTRAMUSCULAR; INTRAVENOUS; SOFT TISSUE at 03:08

## 2021-09-08 RX ORDER — DEXAMETHASONE SODIUM PHOSPHATE 4 MG/ML
4 INJECTION, SOLUTION INTRA-ARTICULAR; INTRALESIONAL; INTRAMUSCULAR; INTRAVENOUS; SOFT TISSUE
Status: DISCONTINUED | OUTPATIENT
Start: 2021-08-24 | End: 2021-09-08 | Stop reason: HOSPADM

## 2021-09-15 ENCOUNTER — TELEPHONE (OUTPATIENT)
Dept: TRANSPLANT | Facility: CLINIC | Age: 46
End: 2021-09-15

## 2021-09-28 ENCOUNTER — HOSPITAL ENCOUNTER (EMERGENCY)
Facility: OTHER | Age: 46
Discharge: HOME | End: 2021-09-28
Attending: EMERGENCY MEDICINE
Payer: COMMERCIAL

## 2021-09-28 VITALS
OXYGEN SATURATION: 100 % | SYSTOLIC BLOOD PRESSURE: 152 MMHG | DIASTOLIC BLOOD PRESSURE: 85 MMHG | WEIGHT: 116 LBS | TEMPERATURE: 98 F | HEIGHT: 61 IN | RESPIRATION RATE: 18 BRPM | HEART RATE: 88 BPM | BODY MASS INDEX: 21.9 KG/M2

## 2021-09-28 DIAGNOSIS — S61.230A PUNCTURE WOUND OF RIGHT INDEX FINGER: ICD-10-CM

## 2021-09-28 DIAGNOSIS — M79.5 FOREIGN BODY (FB) IN SOFT TISSUE: Primary | ICD-10-CM

## 2021-09-28 LAB
B-HCG UR QL: NEGATIVE
CTP QC/QA: YES

## 2021-09-28 PROCEDURE — 81025 URINE PREGNANCY TEST: CPT | Performed by: PHYSICIAN ASSISTANT

## 2021-09-28 PROCEDURE — 90471 IMMUNIZATION ADMIN: CPT | Performed by: PHYSICIAN ASSISTANT

## 2021-09-28 PROCEDURE — 63600175 PHARM REV CODE 636 W HCPCS: Performed by: PHYSICIAN ASSISTANT

## 2021-09-28 PROCEDURE — 25000003 PHARM REV CODE 250: Performed by: PHYSICIAN ASSISTANT

## 2021-09-28 PROCEDURE — 99284 EMERGENCY DEPT VISIT MOD MDM: CPT | Mod: 25

## 2021-09-28 PROCEDURE — 90715 TDAP VACCINE 7 YRS/> IM: CPT | Performed by: PHYSICIAN ASSISTANT

## 2021-09-28 RX ORDER — LIDOCAINE HYDROCHLORIDE 10 MG/ML
10 INJECTION INFILTRATION; PERINEURAL
Status: COMPLETED | OUTPATIENT
Start: 2021-09-28 | End: 2021-09-28

## 2021-09-28 RX ORDER — TALC
POWDER (GRAM) TOPICAL ONCE
COMMUNITY

## 2021-09-28 RX ORDER — CEPHALEXIN 500 MG/1
500 CAPSULE ORAL
Status: COMPLETED | OUTPATIENT
Start: 2021-09-28 | End: 2021-09-28

## 2021-09-28 RX ORDER — CEPHALEXIN 500 MG/1
500 CAPSULE ORAL 4 TIMES DAILY
Qty: 20 CAPSULE | Refills: 0 | Status: SHIPPED | OUTPATIENT
Start: 2021-09-28 | End: 2021-10-03

## 2021-09-28 RX ADMIN — CEPHALEXIN 500 MG: 500 CAPSULE ORAL at 08:09

## 2021-09-28 RX ADMIN — CLOSTRIDIUM TETANI TOXOID ANTIGEN (FORMALDEHYDE INACTIVATED), CORYNEBACTERIUM DIPHTHERIAE TOXOID ANTIGEN (FORMALDEHYDE INACTIVATED), BORDETELLA PERTUSSIS TOXOID ANTIGEN (GLUTARALDEHYDE INACTIVATED), BORDETELLA PERTUSSIS FILAMENTOUS HEMAGGLUTININ ANTIGEN (FORMALDEHYDE INACTIVATED), BORDETELLA PERTUSSIS PERTACTIN ANTIGEN, AND BORDETELLA PERTUSSIS FIMBRIAE 2/3 ANTIGEN 0.5 ML: 5; 2; 2.5; 5; 3; 5 INJECTION, SUSPENSION INTRAMUSCULAR at 08:09

## 2021-09-28 RX ADMIN — LIDOCAINE HYDROCHLORIDE 10 ML: 10 INJECTION, SOLUTION INFILTRATION; PERINEURAL at 08:09

## 2021-10-04 ENCOUNTER — PATIENT MESSAGE (OUTPATIENT)
Dept: ADMINISTRATIVE | Facility: HOSPITAL | Age: 46
End: 2021-10-04

## 2021-12-04 ENCOUNTER — OFFICE VISIT (OUTPATIENT)
Dept: URGENT CARE | Facility: CLINIC | Age: 46
End: 2021-12-04
Payer: COMMERCIAL

## 2021-12-04 VITALS
DIASTOLIC BLOOD PRESSURE: 71 MMHG | TEMPERATURE: 98 F | SYSTOLIC BLOOD PRESSURE: 127 MMHG | HEART RATE: 90 BPM | BODY MASS INDEX: 22.28 KG/M2 | HEIGHT: 61 IN | WEIGHT: 118 LBS | OXYGEN SATURATION: 99 % | RESPIRATION RATE: 18 BRPM

## 2021-12-04 DIAGNOSIS — S90.32XA CONTUSION OF LEFT FOOT, INITIAL ENCOUNTER: ICD-10-CM

## 2021-12-04 DIAGNOSIS — S99.922A FOOT INJURY, LEFT, INITIAL ENCOUNTER: Primary | ICD-10-CM

## 2021-12-04 PROCEDURE — 73630 X-RAY EXAM OF FOOT: CPT | Mod: FY,LT,S$GLB, | Performed by: RADIOLOGY

## 2021-12-04 PROCEDURE — 99214 OFFICE O/P EST MOD 30 MIN: CPT | Mod: S$GLB,,, | Performed by: PHYSICIAN ASSISTANT

## 2021-12-04 PROCEDURE — 73630 XR FOOT COMPLETE 3 VIEW LEFT: ICD-10-PCS | Mod: FY,LT,S$GLB, | Performed by: RADIOLOGY

## 2021-12-04 PROCEDURE — 99214 PR OFFICE/OUTPT VISIT, EST, LEVL IV, 30-39 MIN: ICD-10-PCS | Mod: S$GLB,,, | Performed by: PHYSICIAN ASSISTANT

## 2021-12-31 DIAGNOSIS — E08.9 DIABETES MELLITUS RELATED TO CYSTIC FIBROSIS: ICD-10-CM

## 2021-12-31 DIAGNOSIS — E84.8 DIABETES MELLITUS RELATED TO CYSTIC FIBROSIS: ICD-10-CM

## 2021-12-31 RX ORDER — INSULIN ASPART INJECTION 100 [IU]/ML
10 INJECTION, SOLUTION SUBCUTANEOUS
Qty: 5 PEN | Refills: 11 | Status: SHIPPED | OUTPATIENT
Start: 2021-12-31 | End: 2023-06-28 | Stop reason: SDUPTHER

## 2021-12-31 RX ORDER — PEN NEEDLE, DIABETIC 32 GX 1/6"
1 NEEDLE, DISPOSABLE MISCELLANEOUS
Qty: 100 EACH | Refills: 11 | Status: SHIPPED | OUTPATIENT
Start: 2021-12-31 | End: 2023-09-12 | Stop reason: SDUPTHER

## 2022-01-18 ENCOUNTER — PATIENT MESSAGE (OUTPATIENT)
Dept: ADMINISTRATIVE | Facility: HOSPITAL | Age: 47
End: 2022-01-18
Payer: COMMERCIAL

## 2022-02-14 NOTE — TELEPHONE ENCOUNTER
Initial Pulmozyme and Tobramycin phone consultation complete. Pulmozyme nebulizer solution and tobramycin inhalation solution will be picked up at [facility] on 18. Copay $0/$0. Patient anticipated start date 18. Confirmed 2 patient identifiers - name and . Therapy Appropriate, confirmed patient already has nebulizer machine- eRapid.    Counseled patient on Pulmozyme administration directions:  Inhale 2.5 mg into the lungs once daily.  ?  Patient was counseled on possible Pulmozyme side effects:  · Cardiovascular: Chest pain (18% to 25%)  · Central nervous system: Voice disorder (12% to 18%)  · Dermatologic: Skin rash (3% to 12%)  · Respiratory: Cough, pharyngitis (32% to 40%), rhinitis (30%; in patients with FVC: <40%), decrease in forced vital capacity (?10% decrease of predicted: 22%; in patients with FVC: <40%), dyspnea (17%; in patients with FVC: <40%)  · Miscellaneous: Fever (32% in patients with FVC <40%)    Counseled patient on tobramycin administration directions:  Inhale 300 mg by nebulizer every 12 hours (do not administer doses <6 hours apart); administer in repeated cycles of 28 days on drug followed by 28 days off drug. Use a calendar; [facility] will call when refill time approaching.  ?  Patient was counseled on possible tobramycin side effects:  · Voice alteration (4% to 14%), headache (11% to 12%)  · Respiratory: Cough (31%), rhinitis (11% to 35%), pulmonary disease (30% to 34%; includes pulmonary or cystic fibrosis exacerbations), reduced forced expiratory volume (1% to 31%), discoloration of sputum (21%), productive cough (18% to 20%), rales (solution: 6% to 19%), dyspnea (12% to 16%), decreased lung function (7% to 16%), oropharyngeal pain (11% to 14%), hemoptysis (12% to 13%)  · Miscellaneous: Fever (12% to 16%)  · Tinnitus     DDI: medication list reviewed. No DDIs. Patient confirmed allergies and current medications.     Patient verbalized understanding. Consultation included: indication; goals  of treatment; administration; side effects; how to handle side effects; the importance of compliance, laboratory monitoring and keeping all follow up appointments.  Patient understands to report any medication changes to [facility] and provider. All questions answered and addressed to patients satisfaction. I will f/u with the patient 1 week after initiation to see how she is doing and [facility] will reach out 2 weeks before next 28 day course to set up next refill. Patient advised to call [facility] should any questions arise.    Dede Arteaga, PharmD  Specialty Pharmacy Clinical Pharmacist  [facility]  P: [phone]          (2) Male

## 2022-03-16 ENCOUNTER — PATIENT MESSAGE (OUTPATIENT)
Dept: ADMINISTRATIVE | Facility: HOSPITAL | Age: 47
End: 2022-03-16
Payer: COMMERCIAL

## 2022-05-05 ENCOUNTER — LAB VISIT (OUTPATIENT)
Dept: LAB | Facility: HOSPITAL | Age: 47
End: 2022-05-05
Attending: INTERNAL MEDICINE
Payer: COMMERCIAL

## 2022-05-05 ENCOUNTER — OFFICE VISIT (OUTPATIENT)
Dept: RHEUMATOLOGY | Facility: CLINIC | Age: 47
End: 2022-05-05
Payer: COMMERCIAL

## 2022-05-05 VITALS
HEIGHT: 61 IN | SYSTOLIC BLOOD PRESSURE: 157 MMHG | DIASTOLIC BLOOD PRESSURE: 87 MMHG | HEART RATE: 79 BPM | WEIGHT: 127.44 LBS | BODY MASS INDEX: 24.06 KG/M2

## 2022-05-05 DIAGNOSIS — M19.042 PRIMARY OSTEOARTHRITIS OF BOTH HANDS: ICD-10-CM

## 2022-05-05 DIAGNOSIS — R76.8 POSITIVE ANA (ANTINUCLEAR ANTIBODY): ICD-10-CM

## 2022-05-05 DIAGNOSIS — Z55.9 EDUCATIONAL CIRCUMSTANCE: ICD-10-CM

## 2022-05-05 DIAGNOSIS — M19.041 PRIMARY OSTEOARTHRITIS OF BOTH HANDS: ICD-10-CM

## 2022-05-05 DIAGNOSIS — M79.661 BILATERAL CALF PAIN: ICD-10-CM

## 2022-05-05 DIAGNOSIS — R76.8 POSITIVE ANA (ANTINUCLEAR ANTIBODY): Primary | ICD-10-CM

## 2022-05-05 DIAGNOSIS — M79.662 BILATERAL CALF PAIN: ICD-10-CM

## 2022-05-05 DIAGNOSIS — R53.83 FATIGUE, UNSPECIFIED TYPE: ICD-10-CM

## 2022-05-05 DIAGNOSIS — R68.2 DRY MOUTH: ICD-10-CM

## 2022-05-05 LAB
ALBUMIN SERPL BCP-MCNC: 4.1 G/DL (ref 3.5–5.2)
ALP SERPL-CCNC: 76 U/L (ref 55–135)
ALT SERPL W/O P-5'-P-CCNC: 23 U/L (ref 10–44)
ANION GAP SERPL CALC-SCNC: 8 MMOL/L (ref 8–16)
AST SERPL-CCNC: 18 U/L (ref 10–40)
BILIRUB SERPL-MCNC: 0.3 MG/DL (ref 0.1–1)
BUN SERPL-MCNC: 10 MG/DL (ref 6–20)
CALCIUM SERPL-MCNC: 9.7 MG/DL (ref 8.7–10.5)
CHLORIDE SERPL-SCNC: 103 MMOL/L (ref 95–110)
CO2 SERPL-SCNC: 28 MMOL/L (ref 23–29)
CREAT SERPL-MCNC: 0.7 MG/DL (ref 0.5–1.4)
EST. GFR  (AFRICAN AMERICAN): >60 ML/MIN/1.73 M^2
EST. GFR  (NON AFRICAN AMERICAN): >60 ML/MIN/1.73 M^2
GLUCOSE SERPL-MCNC: 106 MG/DL (ref 70–110)
MAGNESIUM SERPL-MCNC: 1.9 MG/DL (ref 1.6–2.6)
PHOSPHATE SERPL-MCNC: 2.9 MG/DL (ref 2.7–4.5)
POTASSIUM SERPL-SCNC: 4 MMOL/L (ref 3.5–5.1)
PROT SERPL-MCNC: 7.5 G/DL (ref 6–8.4)
SODIUM SERPL-SCNC: 139 MMOL/L (ref 136–145)

## 2022-05-05 PROCEDURE — 99214 PR OFFICE/OUTPT VISIT, EST, LEVL IV, 30-39 MIN: ICD-10-PCS | Mod: S$GLB,,, | Performed by: INTERNAL MEDICINE

## 2022-05-05 PROCEDURE — 84100 ASSAY OF PHOSPHORUS: CPT | Performed by: INTERNAL MEDICINE

## 2022-05-05 PROCEDURE — 3077F PR MOST RECENT SYSTOLIC BLOOD PRESSURE >= 140 MM HG: ICD-10-PCS | Mod: CPTII,S$GLB,, | Performed by: INTERNAL MEDICINE

## 2022-05-05 PROCEDURE — 99999 PR PBB SHADOW E&M-EST. PATIENT-LVL IV: ICD-10-PCS | Mod: PBBFAC,,, | Performed by: INTERNAL MEDICINE

## 2022-05-05 PROCEDURE — 36415 COLL VENOUS BLD VENIPUNCTURE: CPT | Performed by: INTERNAL MEDICINE

## 2022-05-05 PROCEDURE — 99999 PR PBB SHADOW E&M-EST. PATIENT-LVL IV: CPT | Mod: PBBFAC,,, | Performed by: INTERNAL MEDICINE

## 2022-05-05 PROCEDURE — 1159F PR MEDICATION LIST DOCUMENTED IN MEDICAL RECORD: ICD-10-PCS | Mod: CPTII,S$GLB,, | Performed by: INTERNAL MEDICINE

## 2022-05-05 PROCEDURE — 1159F MED LIST DOCD IN RCRD: CPT | Mod: CPTII,S$GLB,, | Performed by: INTERNAL MEDICINE

## 2022-05-05 PROCEDURE — 86235 NUCLEAR ANTIGEN ANTIBODY: CPT | Performed by: INTERNAL MEDICINE

## 2022-05-05 PROCEDURE — 80053 COMPREHEN METABOLIC PANEL: CPT | Performed by: INTERNAL MEDICINE

## 2022-05-05 PROCEDURE — 3008F PR BODY MASS INDEX (BMI) DOCUMENTED: ICD-10-PCS | Mod: CPTII,S$GLB,, | Performed by: INTERNAL MEDICINE

## 2022-05-05 PROCEDURE — 82785 ASSAY OF IGE: CPT | Performed by: INTERNAL MEDICINE

## 2022-05-05 PROCEDURE — 1160F PR REVIEW ALL MEDS BY PRESCRIBER/CLIN PHARMACIST DOCUMENTED: ICD-10-PCS | Mod: CPTII,S$GLB,, | Performed by: INTERNAL MEDICINE

## 2022-05-05 PROCEDURE — 3079F PR MOST RECENT DIASTOLIC BLOOD PRESSURE 80-89 MM HG: ICD-10-PCS | Mod: CPTII,S$GLB,, | Performed by: INTERNAL MEDICINE

## 2022-05-05 PROCEDURE — 3008F BODY MASS INDEX DOCD: CPT | Mod: CPTII,S$GLB,, | Performed by: INTERNAL MEDICINE

## 2022-05-05 PROCEDURE — 3079F DIAST BP 80-89 MM HG: CPT | Mod: CPTII,S$GLB,, | Performed by: INTERNAL MEDICINE

## 2022-05-05 PROCEDURE — 83735 ASSAY OF MAGNESIUM: CPT | Performed by: INTERNAL MEDICINE

## 2022-05-05 PROCEDURE — 86038 ANTINUCLEAR ANTIBODIES: CPT | Performed by: INTERNAL MEDICINE

## 2022-05-05 PROCEDURE — 1160F RVW MEDS BY RX/DR IN RCRD: CPT | Mod: CPTII,S$GLB,, | Performed by: INTERNAL MEDICINE

## 2022-05-05 PROCEDURE — 3077F SYST BP >= 140 MM HG: CPT | Mod: CPTII,S$GLB,, | Performed by: INTERNAL MEDICINE

## 2022-05-05 PROCEDURE — 99214 OFFICE O/P EST MOD 30 MIN: CPT | Mod: S$GLB,,, | Performed by: INTERNAL MEDICINE

## 2022-05-05 RX ORDER — DEXTROAMPHETAMINE SACCHARATE, AMPHETAMINE ASPARTATE MONOHYDRATE, DEXTROAMPHETAMINE SULFATE AND AMPHETAMINE SULFATE 2.5; 2.5; 2.5; 2.5 MG/1; MG/1; MG/1; MG/1
10 CAPSULE, EXTENDED RELEASE ORAL 3 TIMES DAILY
COMMUNITY
End: 2023-12-27 | Stop reason: ALTCHOICE

## 2022-05-05 SDOH — SOCIAL DETERMINANTS OF HEALTH (SDOH): PROBLEMS RELATED TO EDUCATION AND LITERACY, UNSPECIFIED: Z55.9

## 2022-05-05 ASSESSMENT — ROUTINE ASSESSMENT OF PATIENT INDEX DATA (RAPID3)
FATIGUE SCORE: 7
MDHAQ FUNCTION SCORE: 0
PAIN SCORE: 5
AM STIFFNESS SCORE: 1, YES
WHEN YOU AWAKENED IN THE MORNING OVER THE LAST WEEK, PLEASE INDICATE THE AMOUNT OF TIME IT TAKES UNTIL YOU ARE AS LIMBER AS YOU WILL BE FOR THE DAY: 30 MINUTES
PATIENT GLOBAL ASSESSMENT SCORE: 5
PSYCHOLOGICAL DISTRESS SCORE: 4.4
TOTAL RAPID3 SCORE: 3.33

## 2022-05-05 NOTE — PROGRESS NOTES
Subjective:     Patient ID: Olinda Lombardi is a 47 y.o. female sent for consultation on +CIARA by Dr. Parvin Ratliff    Chief Complaint: No chief complaint on file.       HPI   47 yr old lady w cystic fibrosis (mild case according to patient), DM II, ADD, anxiety depression, PsO who was seen by Dr. Schumacher on 9/5/19 for arthralgia/myalgia & fatigue & never returned for f/u. She subsequently saw me on 5/13/21 for leg swelling. At that time there was no evidence of leg swelling but a dx of fibromyalgia/CSS & OA of the hands were made & patient was educated & counseled & referred back to her PCP for f/u.    She returns today. Still c/o leg swelling which she attributes to Trikafta started ~ 10/2019. The drug helped her CF cough & congestion. She is followed by a [facility] Pulmonologist, Dr. Ratliff who patient states did lab work and found a + CIARA & referred her to see a rheumatologist again.    Patient feels that since she's been on this med she has had many sxs. By far her most pressing concern is what she calls swelling of her legs, but it is really her calves that hurt and feel swollen.     That was what she was c/o last visit, but we could not confirm the leg swelling.    Also concerned about her teeth are rotting and that she has multiple cavities that suddenly popped up.  She does have dry mouth but not dry eyes and is also concerned about Sjogren's.     Also concerned about R inguinal lymph nodes which she can palpate.     Labs done by [facility] on 4/14/22 CIARA 1:80 Sp; no profile  RF neg;   ESR 2; CRP ok; TSH ok;  Hg 11.1; Ht 34  Has iron deficiency w low sat     CTD ?  AM stiffness x  30 minutes. For joint pain has CTS; denies joint swelling; no Raynaud's; no current psoriasis .  Denies  dysphagia, tight skin, alopecia, oral ulcers, parotid gland swelling (but has discoloration of jaw), dry eyes,  pleurisy, pericarditis, photosensitivity, miscarriages (0/0), thromboses, muscle weakness; fevers, headaches,  "conjunctivitis, vaginal/urethral D/C; paresthesias; LBP, family hx of CTDs or SpA    Current Outpatient Medications   Medication Sig Dispense Refill    ANUCORT-HC 25 mg suppository INSERT 1 SUPPOSITORY RECTALLY TWICE A DAY AS NEEDED FOR HEMORRHOIDS 30 suppository 35    bismuth subsalicylate (PINK BISMUTH ORAL) Take by mouth.      blood sugar diagnostic Strp To check BG 10 times daily, to use with insurance preferred meter 300 strip 11    dextroamphetamine-amphetamine (ADDERALL XR) 10 MG 24 hr capsule Take 10 mg by mouth 3 (three) times daily.      ferrous sulfate 325 (65 FE) MG EC tablet Take 325 mg by mouth once daily.      insulin aspart, niacinamide, (FIASP FLEXTOUCH U-100 INSULIN) 100 unit/mL (3 mL) InPn Inject 10 Units into the skin 3 (three) times daily with meals. INJECT 10 UNITS INTO THE SKIN THREE TIMES DAILY BEFORE MEALS 5 pen 11    multivit-minerals/folic acid (ONE-A-DAY WOMEN VITACRAVES ORAL) Take 1 Dose by mouth once daily.      NOVOFINE PLUS 32 gauge x 1/6" Ndle Inject 1 each into the skin 3 (three) times daily before meals. 100 each 11    sertraline (ZOLOFT) 100 MG tablet Take 100 mg by mouth 2 (two) times daily.       traZODone (DESYREL) 100 MG tablet Take 1 tablet by mouth nightly as needed.      TRIKAFTA 100-50-75 mg(d) /150 mg (n) TbSQ TAKE 2 ORANGE TABLETS IN THE MORNING AND 1 BLUE TABLET IN THE EVENING APPROXIMATELY 12 HOURS APART. TAKE WITH FAT-CONTAINING FOOD 4 tablet 11    ZENPEP 40,000-126,000- 168,000 unit CpDR TAKE 4 CAPSULES THREE TIMES A DAY WITH MEALS 1100 capsule 3    alprazolam (XANAX) 0.5 MG tablet Take 0.5 mg by mouth 2 (two) times daily as needed.       blood-glucose meter kit To check BG 10  times daily, to use with insurance preferred meter 1 each 0    cyanocobalamin (VITAMIN B-12) 1000 MCG tablet Take 100 mcg by mouth once daily.      levocetirizine (XYZAL) 5 MG tablet Take 5 mg by mouth daily as needed.       magnesium oxide (MAG-OX) 250 mg magnesium Tab Take " by mouth once.      MYDAYIS 50 mg CT24 50 mEq/oz/day.       pantoprazole (PROTONIX) 40 MG tablet Take 1 tablet (40 mg total) by mouth once daily. 30 tablet 0    simethicone (GAS-X ORAL) Take by mouth.      TAZORAC 0.05 % Crea cream        No current facility-administered medications for this visit.         Review of patient's allergies indicates:   Allergen Reactions    Milk containing products Diarrhea    Penicillin     Strawberry        Review of Systems   Constitutional: Positive for fatigue.   Respiratory: Positive for shortness of breath.    Gastrointestinal: Positive for diarrhea.        Has hemorroids.   Endocrine: Positive for cold intolerance.   Genitourinary: Positive for menstrual problem (long & heavy.).   Musculoskeletal: Negative for back pain.   Psychiatric/Behavioral: Positive for dysphoric mood and sleep disturbance. The patient is nervous/anxious.        Past Medical History:   Diagnosis Date    Abnormal Pap smear of vagina     Bilateral carpal tunnel syndrome     Cystic fibrosis     Cystic fibrosis     Diabetes mellitus     CFRD    Psoriasis     Recurrent upper respiratory infection (URI)        Past Surgical History:   Procedure Laterality Date    ADENOIDECTOMY      COLONOSCOPY N/A 12/18/2018    Procedure: COLONOSCOPY;  Surgeon: Ernestina Onofre MD;  Location: [facility];  Service: Endoscopy;  Laterality: N/A;    ESOPHAGOGASTRODUODENOSCOPY N/A 12/18/2018    Procedure: ESOPHAGOGASTRODUODENOSCOPY (EGD);  Surgeon: Ernestnia Onofre MD;  Location: [facility];  Service: Endoscopy;  Laterality: N/A;    SINUS SURGERY         Family History   Problem Relation Age of Onset    Heart disease Father     Diabetes Mother     Breast cancer Maternal Grandmother     Colon cancer Neg Hx     Ovarian cancer Neg Hx     Celiac disease Neg Hx     Crohn's disease Neg Hx     Esophageal cancer Neg Hx     Inflammatory bowel disease Neg Hx     Irritable bowel syndrome Neg Hx     Liver cancer Neg Hx  "    Rectal cancer Neg Hx     Stomach cancer Neg Hx     Ulcerative colitis Neg Hx        Social History     Tobacco Use    Smoking status: Never Smoker    Smokeless tobacco: Never Used   Substance Use Topics    Alcohol use: Not Currently     Comment: 1-2 drinks a week     Drug use: No       Objective:     BP (!) 157/87   Pulse 79   Ht 5' 1" (1.549 m)   Wt 57.8 kg (127 lb 6.8 oz)   BMI 24.08 kg/m²     Physical Exam   Constitutional: She is oriented to person, place, and time. No distress.   HENT:   Head: Normocephalic and atraumatic.   Mouth/Throat: Oropharynx is clear and moist. Mucous membranes are moist. No oropharyngeal exudate.   No parotidomegaly;   No oral ulcers;   Eyes: Pupils are equal, round, and reactive to light. Conjunctivae are normal. No scleral icterus.   Neck: No JVD present. No tracheal deviation present. No thyromegaly present.   Cardiovascular: Normal rate, regular rhythm and normal heart sounds. Exam reveals no gallop and no friction rub.   No murmur heard.  Pulmonary/Chest: Effort normal and breath sounds normal. No respiratory distress. She has no wheezes. She has no rales. She exhibits no tenderness.   Abdominal: Soft. Bowel sounds are normal. She exhibits no distension and no mass. There is no splenomegaly or hepatomegaly. There is no abdominal tenderness. There is no rebound and no guarding.   Musculoskeletal:         General: No swelling or tenderness (no calf tenderness; ).      Right lower leg: No edema.      Left lower leg: No edema.      Comments: Cspine FROM no tenderness  Tspine FROM no tenderness  Lspine FROM no tenderness.  TMJ: unremarkable  Shoulders: FROM; no synovitis  Elbows: FROM; no synovitis; no tophi or nodules  Wrists: FROM; no synovitis  MCPs: FROM; no synovitis; no metacarpalgia;  ok;  PIPs:FROM; multiple Andrea's;no synovitis;  DIPs: FROM; multiple Heberden's no synovitis  HIPS: FROM  Knees: FROM; no synovitis; no instability;  Ankles: FROM: no " synovitis   Toes: ok;        Lymphadenopathy:     She has no cervical adenopathy. No inguinal adenopathy noted on the right (appear normal sized & non TTP) or left side.   Neurological: She is alert and oriented to person, place, and time. She has normal reflexes. No cranial nerve deficit.   Motor strength: 5/5 prox & distal.   Skin: Skin is warm and dry. No rash noted.   Psychiatric: Mood, memory, affect and judgment normal.   Vitals reviewed.      5/13/21: ESR 9(36); CRP<0.1; CBC ok; CMP glu 115; TSH ok; Vit D 22  9/15/19: CIARA/SSA neg; RF/CCP neg  9/25/19: Arthritis survey: personally viewed: min OA of Cspine  Assessment:     +CIARA 1:80  Bilateral calf  Pain  Dry mouth  OA of hands  Chronic Fatigue  Previously   Multiple joint/body pain c/w fibromyalgia like syndrome   Anxiety/depression/ADHD  Vitamin D insufficiency  Hx of PsO      Plan:   This is a very complicated patient. Sxs may be influenced by anxiety.  Patient insistent that CF med responsible for many of her sxs. Patient aware that I am not knowledgeable about this drug.  In an abundance of caution and to reassure patient a number of labs were obtained (most at patient's request).  I explained to patient that I did not think she had anything that I treat & if any non rheum abnormalities were detected, she would have to be followed by the appropriate clinician.  Patient is discharged from clinic unless labs say otherwise.      CC: Parvin Ratliff MD--[facility]    Addendum: 5/5/22: CMP ok; CIARA/SSA neg; Mg, P, & IgE wnl.

## 2022-05-06 LAB
ANA SER QL IF: NORMAL
ANTI-SSA ANTIBODY: 0.06 RATIO (ref 0–0.99)
ANTI-SSA INTERPRETATION: NEGATIVE
IGE SERPL-ACNC: 38 IU/ML (ref 0–100)

## 2023-03-08 ENCOUNTER — OFFICE VISIT (OUTPATIENT)
Dept: INTERNAL MEDICINE | Facility: CLINIC | Age: 48
End: 2023-03-08
Payer: COMMERCIAL

## 2023-03-08 ENCOUNTER — HOSPITAL ENCOUNTER (OUTPATIENT)
Dept: RADIOLOGY | Facility: HOSPITAL | Age: 48
Discharge: HOME | End: 2023-03-08
Attending: HOSPITALIST
Payer: COMMERCIAL

## 2023-03-08 VITALS
RESPIRATION RATE: 20 BRPM | DIASTOLIC BLOOD PRESSURE: 84 MMHG | TEMPERATURE: 98 F | HEIGHT: 61 IN | OXYGEN SATURATION: 95 % | BODY MASS INDEX: 26.22 KG/M2 | WEIGHT: 138.88 LBS | HEART RATE: 95 BPM | SYSTOLIC BLOOD PRESSURE: 132 MMHG

## 2023-03-08 DIAGNOSIS — J06.9 BACTERIAL URI: Primary | ICD-10-CM

## 2023-03-08 DIAGNOSIS — R05.9 COUGH, UNSPECIFIED TYPE: ICD-10-CM

## 2023-03-08 DIAGNOSIS — B96.89 BACTERIAL URI: Primary | ICD-10-CM

## 2023-03-08 LAB
INFLUENZA A, MOLECULAR: NEGATIVE
INFLUENZA B, MOLECULAR: NEGATIVE
SPECIMEN SOURCE: NORMAL

## 2023-03-08 PROCEDURE — 1160F PR REVIEW ALL MEDS BY PRESCRIBER/CLIN PHARMACIST DOCUMENTED: ICD-10-PCS | Mod: CPTII,S$GLB,, | Performed by: HOSPITALIST

## 2023-03-08 PROCEDURE — 3079F PR MOST RECENT DIASTOLIC BLOOD PRESSURE 80-89 MM HG: ICD-10-PCS | Mod: CPTII,S$GLB,, | Performed by: HOSPITALIST

## 2023-03-08 PROCEDURE — U0005 INFEC AGEN DETEC AMPLI PROBE: HCPCS | Performed by: HOSPITALIST

## 2023-03-08 PROCEDURE — 99999 PR PBB SHADOW E&M-EST. PATIENT-LVL V: CPT | Mod: PBBFAC,,, | Performed by: HOSPITALIST

## 2023-03-08 PROCEDURE — 71046 XR CHEST PA AND LATERAL: ICD-10-PCS | Mod: 26,,, | Performed by: RADIOLOGY

## 2023-03-08 PROCEDURE — 99203 PR OFFICE/OUTPT VISIT, NEW, LEVL III, 30-44 MIN: ICD-10-PCS | Mod: S$GLB,,, | Performed by: HOSPITALIST

## 2023-03-08 PROCEDURE — 1159F MED LIST DOCD IN RCRD: CPT | Mod: CPTII,S$GLB,, | Performed by: HOSPITALIST

## 2023-03-08 PROCEDURE — 3075F SYST BP GE 130 - 139MM HG: CPT | Mod: CPTII,S$GLB,, | Performed by: HOSPITALIST

## 2023-03-08 PROCEDURE — 71046 X-RAY EXAM CHEST 2 VIEWS: CPT | Mod: 26,,, | Performed by: RADIOLOGY

## 2023-03-08 PROCEDURE — 3075F PR MOST RECENT SYSTOLIC BLOOD PRESS GE 130-139MM HG: ICD-10-PCS | Mod: CPTII,S$GLB,, | Performed by: HOSPITALIST

## 2023-03-08 PROCEDURE — 99999 PR PBB SHADOW E&M-EST. PATIENT-LVL V: ICD-10-PCS | Mod: PBBFAC,,, | Performed by: HOSPITALIST

## 2023-03-08 PROCEDURE — 71046 X-RAY EXAM CHEST 2 VIEWS: CPT | Mod: TC,PO

## 2023-03-08 PROCEDURE — 3079F DIAST BP 80-89 MM HG: CPT | Mod: CPTII,S$GLB,, | Performed by: HOSPITALIST

## 2023-03-08 PROCEDURE — 1160F RVW MEDS BY RX/DR IN RCRD: CPT | Mod: CPTII,S$GLB,, | Performed by: HOSPITALIST

## 2023-03-08 PROCEDURE — 99203 OFFICE O/P NEW LOW 30 MIN: CPT | Mod: S$GLB,,, | Performed by: HOSPITALIST

## 2023-03-08 PROCEDURE — 3008F BODY MASS INDEX DOCD: CPT | Mod: CPTII,S$GLB,, | Performed by: HOSPITALIST

## 2023-03-08 PROCEDURE — 1159F PR MEDICATION LIST DOCUMENTED IN MEDICAL RECORD: ICD-10-PCS | Mod: CPTII,S$GLB,, | Performed by: HOSPITALIST

## 2023-03-08 PROCEDURE — 3008F PR BODY MASS INDEX (BMI) DOCUMENTED: ICD-10-PCS | Mod: CPTII,S$GLB,, | Performed by: HOSPITALIST

## 2023-03-08 PROCEDURE — 87502 INFLUENZA DNA AMP PROBE: CPT | Mod: PO | Performed by: HOSPITALIST

## 2023-03-08 RX ORDER — BENZONATATE 100 MG/1
100 CAPSULE ORAL 3 TIMES DAILY PRN
Qty: 30 CAPSULE | Refills: 0 | Status: SHIPPED | OUTPATIENT
Start: 2023-03-08 | End: 2023-03-18

## 2023-03-08 RX ORDER — AZITHROMYCIN 250 MG/1
TABLET, FILM COATED ORAL
Qty: 6 TABLET | Refills: 0 | Status: SHIPPED | OUTPATIENT
Start: 2023-03-08 | End: 2023-03-13

## 2023-03-08 NOTE — PROGRESS NOTES
Subjective:     @Patient ID: Olinda Lombardi is a 47 y.o. female.    Chief Complaint: Cough    HPI    48 yo F with CF, DM, pancreatic insufficiency presents for urgent visit with c/o cough that started report of 5 days ago.  Patient also endorses sputum med as yellowish colored.  She also endorses fatigue.  And also fevers of approximately 100 F.  Reports her  had similar symptoms over the weekend.  She also reports that she is been taking Tylenol and ibuprofen.  She took 2 home COVID test and they were negative.  Endorses congestion, mild sore throat and mild wheezing.     Review of Systems   Constitutional:  Positive for fever.   HENT:  Positive for congestion and sore throat.    Respiratory:  Positive for cough and wheezing.    Psychiatric/Behavioral:  Negative for agitation and confusion.    Past medical history, surgical history, and family medical history reviewed and updated as appropriate.    Medications and allergies reviewed.     Objective:     There were no vitals filed for this visit.  There is no height or weight on file to calculate BMI.  Physical Exam  Constitutional:       Appearance: Normal appearance.   HENT:      Head: Normocephalic and atraumatic.      Right Ear: Tympanic membrane normal.      Left Ear: Tympanic membrane normal.      Mouth/Throat:      Mouth: Mucous membranes are moist.      Pharynx: No oropharyngeal exudate.   Eyes:      General:         Right eye: No discharge.         Left eye: No discharge.      Conjunctiva/sclera: Conjunctivae normal.   Cardiovascular:      Rate and Rhythm: Normal rate and regular rhythm.      Heart sounds: No murmur heard.  Pulmonary:      Effort: Pulmonary effort is normal.      Breath sounds: Normal breath sounds.   Musculoskeletal:         General: Normal range of motion.      Cervical back: Normal range of motion and neck supple.   Skin:     General: Skin is warm and dry.   Neurological:      Mental Status: She is alert and oriented to  person, place, and time.   Psychiatric:         Mood and Affect: Mood normal.         Behavior: Behavior normal.       Lab Results   Component Value Date    WBC 3.99 05/13/2021    HGB 12.8 05/13/2021    HCT 38.5 05/13/2021     05/13/2021    CHOL 143 03/21/2019    TRIG 42 03/21/2019    HDL 54 03/21/2019    ALT 23 05/05/2022    AST 18 05/05/2022     05/05/2022    K 4.0 05/05/2022     05/05/2022    CREATININE 0.7 05/05/2022    BUN 10 05/05/2022    CO2 28 05/05/2022    TSH 2.471 05/13/2021    INR 1.0 09/08/2012    HGBA1C 6.4 (H) 12/03/2019       Assessment:     1. Bacterial URI    2. Cough, unspecified type      Plan:   Olinda was seen today for cough.    Diagnoses and all orders for this visit:    Bacterial URI  -     azithromycin (Z-CHRISTINA) 250 MG tablet; Take 2 tablets by mouth on day 1; Take 1 tablet by mouth on days 2-5    Cough, unspecified type  -     Influenza A & B by Molecular  -     COVID-19 Routine Screening  -     X-Ray Chest PA And Lateral; Future    Other orders  -     benzonatate (TESSALON) 100 MG capsule; Take 1 capsule (100 mg total) by mouth 3 (three) times daily as needed.          No follow-ups on file.    Fany Suero MD  Internal Medicine    3/8/2023

## 2023-03-09 LAB — SARS-COV-2 RNA RESP QL NAA+PROBE: NOT DETECTED

## 2023-06-28 DIAGNOSIS — E08.9 DIABETES MELLITUS RELATED TO CYSTIC FIBROSIS: Primary | ICD-10-CM

## 2023-06-28 DIAGNOSIS — E84.8 DIABETES MELLITUS RELATED TO CYSTIC FIBROSIS: Primary | ICD-10-CM

## 2023-06-28 RX ORDER — INSULIN ASPART INJECTION 100 [IU]/ML
10 INJECTION, SOLUTION SUBCUTANEOUS
Qty: 5 PEN | Refills: 11 | Status: SHIPPED | OUTPATIENT
Start: 2023-06-28 | End: 2023-09-11 | Stop reason: SDUPTHER

## 2023-06-28 NOTE — TELEPHONE ENCOUNTER
----- Message from Elvira Huff sent at 6/28/2023  1:27 PM CDT -----  Olinda Lombardi calling regarding Appointment Access for wanting to be seen again after over 3 years for diabetes levels being off and also stated that she is running out of insulin, she is asking for a soonest appt possible and only want to be seen by Dr. Lester,  call back to inform [phone]

## 2023-06-29 NOTE — ASSESSMENT & PLAN NOTE
A1c in 5/2023 was 6.4%, stable.  Occasional hypoglycemia/hyperglycemia, likely due to inconsistent carbs.  Does not want to do insulin to carb ratio with meals.   Continue current diabetes regimen.    Discussed importance of diet and lifestyle modifications for diabetes management.  Discussed importance of consistent carbohydrate diet.  Hypoglycemia management discussed. Carry glucose tablets or snacks at all times.   Recommended checking blood sugars before meals and bedtime.  Patient is interested in CGM and prescriptions sent for approval.       Complications:  Follow up for regular diabetes eye exam  Daily self examination of feet  Microalbumin: Monitor.

## 2023-06-29 NOTE — PROGRESS NOTES
ENDOCRINOLOGY CLINIC  07/13/2023       Subjective:        Chief Complaint: CF related diabetes      HPI:   Olinda Lombardi is a 48 y.o. female who presents for follow up of CF related diabetes. Patient was seen by Dr. Powell on 4/23/2020.     Has active history of cystic fibrosis and cystic fibrosis related diabetes.   Patient is on trikafta.     Diabetes Hx:  Diagnosed w/ DM: CFRD diagnosed in 2014/2015.  Complications:   Retinopathy: No   Last eye exam: 2019, contact lens 6 months.  Neuropathy: No  Nephropathy: No  DKA/HHS: No    Severe Hypoglycemia: No, Hypoglycemia unawareness:No, Glucagon: No  Hypoglycemic episodes: Occasional lows in the 30s.     Current meds:   Fiasp 3 units with each meal three times daily - she is fearful of giving higher doses of insulin due to previous history of hypoglycemia.  No basal needs    Compliance with meds: Yes    Home glucose checks: checks when her blood sugars are persistently high or low or when symptomatic.     Diet/Exercise: 1 meal a day and snacks during the day. Patient does not count her carbohydrates and does state that sometimes she takes excess carbs in her diet which causes some post prandial hyperglycemia.     Diabetes education: Seen an educator previously.     May 15 2023 labs  Creatinine 0.7, .   A1c 6.4%.     Last A1c:   Lab Results   Component Value Date    HGBA1C 6.4 (H) 12/03/2019    HGBA1C 5.9 (H) 09/18/2018    HGBA1C 6.2 (H) 06/12/2018       microalbumin:   Lab Results   Component Value Date    CREATRANDUR 142.0 05/13/2021     Lipids:   Lab Results   Component Value Date    CHOL 143 03/21/2019    TRIG 42 03/21/2019    HDL 54 03/21/2019    LDLCALC 80.6 03/21/2019    CHOLHDL 37.8 03/21/2019       TSH:  Lab Results   Component Value Date    TSH 2.471 05/13/2021     Vitamin B12 No results found for: V12   Lab Results   Component Value Date    HGB 12.8 05/13/2021      Aspirin: No  Statins: No  ACEI/ARB: No  Fatty liver: No      Polyuria:  Yes  Polydipsia: No      FHx of DM: Yes, Parents had type 2 diabetes. Heart disease: Yes, Valvular hear disease.     ROS: see HPI     Objective:   Physical Exam   BP (!) 149/80 (BP Location: Right arm, Patient Position: Sitting, BP Method: Medium (Automatic))   Pulse 95   Wt 59.5 kg (131 lb 2.8 oz)   SpO2 100%   BMI 24.79 kg/m²     Wt Readings from Last 3 Encounters:   07/05/23 59.5 kg (131 lb 2.8 oz)   03/08/23 63 kg (138 lb 14.2 oz)   05/05/22 57.8 kg (127 lb 6.8 oz)       Constitutional:  Pleasant,  in no acute distress.   HENT:   Head:    Normocephalic and atraumatic.   Eyes:    EOMI. No scleral icterus.   Cardiovascular:  Normal rate, regular rhythm  Respiratory:   Effort normal   Neurological:  No tremor  Skin:    Skin is warm, dry  Extremity:  No edema    DIABETIC FOOT EXAM: 7/13/2023 - normal sensation to microfilament testing bilaterally.  No fissures, wounds, or ulcers.    Injection sites normal w/o lipohypertrophy      LABORATORY REVIEW:  See HPI for other labs reviewed today      Chemistry        Component Value Date/Time     05/05/2022 1742    K 4.0 05/05/2022 1742     05/05/2022 1742    CO2 28 05/05/2022 1742    BUN 10 05/05/2022 1742    CREATININE 0.7 05/05/2022 1742     05/05/2022 1742        Component Value Date/Time    CALCIUM 9.7 05/05/2022 1742    ALKPHOS 76 05/05/2022 1742    AST 18 05/05/2022 1742    ALT 23 05/05/2022 1742    BILITOT 0.3 05/05/2022 1742    ESTGFRAFRICA >60.0 05/05/2022 1742    EGFRNONAA >60.0 05/05/2022 1742          Lab Results   Component Value Date    HGBA1C 6.4 (H) 12/03/2019    HGBA1C 5.9 (H) 09/18/2018    HGBA1C 6.2 (H) 06/12/2018     Other labs reviewed today in HPI    Assessment/Plan:     Problem List Items Addressed This Visit          Endocrine    Diabetes mellitus related to cystic fibrosis (Chronic)         A1c in 5/2023 was 6.4%, stable.  Occasional hypoglycemia/hyperglycemia, likely due to inconsistent carbs.  Does not want to do insulin to  carb ratio with meals.   Continue current diabetes regimen.    Discussed importance of diet and lifestyle modifications for diabetes management.  Discussed importance of consistent carbohydrate diet.  Hypoglycemia management discussed. Carry glucose tablets or snacks at all times.   Recommended checking blood sugars before meals and bedtime.  Patient is interested in CGM and prescriptions sent for approval.       Complications:  Follow up for regular diabetes eye exam  Daily self examination of feet  Microalbumin: Monitor.           Relevant Orders    Ambulatory referral/consult to Podiatry    Hemoglobin A1C    Microalbumin/Creatinine Ratio, Urine    Ambulatory referral/consult to Diabetes Education        Return to clinic in 6 months    Alexa Street MD

## 2023-07-05 ENCOUNTER — OFFICE VISIT (OUTPATIENT)
Dept: ENDOCRINOLOGY | Facility: CLINIC | Age: 48
End: 2023-07-05
Payer: COMMERCIAL

## 2023-07-05 VITALS
BODY MASS INDEX: 24.79 KG/M2 | SYSTOLIC BLOOD PRESSURE: 149 MMHG | HEART RATE: 95 BPM | DIASTOLIC BLOOD PRESSURE: 80 MMHG | OXYGEN SATURATION: 100 % | WEIGHT: 131.19 LBS

## 2023-07-05 DIAGNOSIS — E84.8 DIABETES MELLITUS RELATED TO CYSTIC FIBROSIS: Chronic | ICD-10-CM

## 2023-07-05 DIAGNOSIS — E08.9 DIABETES MELLITUS RELATED TO CYSTIC FIBROSIS: Chronic | ICD-10-CM

## 2023-07-05 PROCEDURE — 1160F RVW MEDS BY RX/DR IN RCRD: CPT | Mod: CPTII,S$GLB,, | Performed by: INTERNAL MEDICINE

## 2023-07-05 PROCEDURE — 99999 PR PBB SHADOW E&M-EST. PATIENT-LVL V: ICD-10-PCS | Mod: PBBFAC,,, | Performed by: INTERNAL MEDICINE

## 2023-07-05 PROCEDURE — 3077F PR MOST RECENT SYSTOLIC BLOOD PRESSURE >= 140 MM HG: ICD-10-PCS | Mod: CPTII,S$GLB,, | Performed by: INTERNAL MEDICINE

## 2023-07-05 PROCEDURE — 3077F SYST BP >= 140 MM HG: CPT | Mod: CPTII,S$GLB,, | Performed by: INTERNAL MEDICINE

## 2023-07-05 PROCEDURE — 3079F DIAST BP 80-89 MM HG: CPT | Mod: CPTII,S$GLB,, | Performed by: INTERNAL MEDICINE

## 2023-07-05 PROCEDURE — 1159F PR MEDICATION LIST DOCUMENTED IN MEDICAL RECORD: ICD-10-PCS | Mod: CPTII,S$GLB,, | Performed by: INTERNAL MEDICINE

## 2023-07-05 PROCEDURE — 3079F PR MOST RECENT DIASTOLIC BLOOD PRESSURE 80-89 MM HG: ICD-10-PCS | Mod: CPTII,S$GLB,, | Performed by: INTERNAL MEDICINE

## 2023-07-05 PROCEDURE — 1159F MED LIST DOCD IN RCRD: CPT | Mod: CPTII,S$GLB,, | Performed by: INTERNAL MEDICINE

## 2023-07-05 PROCEDURE — 3008F PR BODY MASS INDEX (BMI) DOCUMENTED: ICD-10-PCS | Mod: CPTII,S$GLB,, | Performed by: INTERNAL MEDICINE

## 2023-07-05 PROCEDURE — 99999 PR PBB SHADOW E&M-EST. PATIENT-LVL V: CPT | Mod: PBBFAC,,, | Performed by: INTERNAL MEDICINE

## 2023-07-05 PROCEDURE — 3008F BODY MASS INDEX DOCD: CPT | Mod: CPTII,S$GLB,, | Performed by: INTERNAL MEDICINE

## 2023-07-05 PROCEDURE — 1160F PR REVIEW ALL MEDS BY PRESCRIBER/CLIN PHARMACIST DOCUMENTED: ICD-10-PCS | Mod: CPTII,S$GLB,, | Performed by: INTERNAL MEDICINE

## 2023-07-05 PROCEDURE — 99214 OFFICE O/P EST MOD 30 MIN: CPT | Mod: S$GLB,,, | Performed by: INTERNAL MEDICINE

## 2023-07-05 PROCEDURE — 99214 PR OFFICE/OUTPT VISIT, EST, LEVL IV, 30-39 MIN: ICD-10-PCS | Mod: S$GLB,,, | Performed by: INTERNAL MEDICINE

## 2023-07-05 RX ORDER — LEVALBUTEROL TARTRATE 45 UG/1
2 AEROSOL, METERED ORAL
COMMUNITY
Start: 2023-04-20 | End: 2023-12-27 | Stop reason: ALTCHOICE

## 2023-07-05 RX ORDER — BLOOD-GLUCOSE SENSOR
EACH MISCELLANEOUS
Qty: 3 EACH | Refills: 11 | Status: SHIPPED | OUTPATIENT
Start: 2023-07-05 | End: 2023-07-11 | Stop reason: ALTCHOICE

## 2023-07-05 NOTE — Clinical Note
Urine test today.  A1c in 4 weeks.  Follow up in 6 months.  Diabetes educator appointment after dexcom approval.

## 2023-07-06 ENCOUNTER — TELEPHONE (OUTPATIENT)
Dept: PHARMACY | Facility: CLINIC | Age: 48
End: 2023-07-06
Payer: COMMERCIAL

## 2023-07-06 ENCOUNTER — TELEPHONE (OUTPATIENT)
Dept: DIABETES | Facility: CLINIC | Age: 48
End: 2023-07-06
Payer: COMMERCIAL

## 2023-07-11 DIAGNOSIS — E84.8 DIABETES MELLITUS RELATED TO CYSTIC FIBROSIS: Primary | Chronic | ICD-10-CM

## 2023-07-11 DIAGNOSIS — E08.9 DIABETES MELLITUS RELATED TO CYSTIC FIBROSIS: Primary | Chronic | ICD-10-CM

## 2023-07-11 RX ORDER — BLOOD-GLUCOSE SENSOR
2 EACH MISCELLANEOUS
Qty: 2 EACH | Refills: 11 | Status: SHIPPED | OUTPATIENT
Start: 2023-07-11 | End: 2024-07-10

## 2023-07-11 RX ORDER — FLASH GLUCOSE SCANNING READER
1 EACH MISCELLANEOUS DAILY
Qty: 1 EACH | Refills: 0 | Status: SHIPPED | OUTPATIENT
Start: 2023-07-11

## 2023-07-27 ENCOUNTER — TELEPHONE (OUTPATIENT)
Dept: ADMINISTRATIVE | Facility: HOSPITAL | Age: 48
End: 2023-07-27
Payer: COMMERCIAL

## 2023-08-08 ENCOUNTER — TELEPHONE (OUTPATIENT)
Dept: DIABETES | Facility: CLINIC | Age: 48
End: 2023-08-08
Payer: COMMERCIAL

## 2023-08-08 ENCOUNTER — TELEPHONE (OUTPATIENT)
Dept: PHARMACY | Facility: CLINIC | Age: 48
End: 2023-08-08
Payer: COMMERCIAL

## 2023-08-08 DIAGNOSIS — E84.8 DIABETES MELLITUS RELATED TO CYSTIC FIBROSIS: Primary | Chronic | ICD-10-CM

## 2023-08-08 DIAGNOSIS — E08.9 DIABETES MELLITUS RELATED TO CYSTIC FIBROSIS: Primary | Chronic | ICD-10-CM

## 2023-08-18 ENCOUNTER — OFFICE VISIT (OUTPATIENT)
Dept: URGENT CARE | Facility: CLINIC | Age: 48
End: 2023-08-18
Payer: COMMERCIAL

## 2023-08-18 VITALS
BODY MASS INDEX: 24.77 KG/M2 | RESPIRATION RATE: 22 BRPM | TEMPERATURE: 100 F | SYSTOLIC BLOOD PRESSURE: 158 MMHG | HEART RATE: 110 BPM | WEIGHT: 131.19 LBS | HEIGHT: 61 IN | OXYGEN SATURATION: 98 % | DIASTOLIC BLOOD PRESSURE: 89 MMHG

## 2023-08-18 DIAGNOSIS — S61.210A LACERATION OF RIGHT INDEX FINGER WITHOUT FOREIGN BODY WITHOUT DAMAGE TO NAIL, INITIAL ENCOUNTER: Primary | ICD-10-CM

## 2023-08-18 DIAGNOSIS — E08.9 DIABETES MELLITUS RELATED TO CYSTIC FIBROSIS: Chronic | ICD-10-CM

## 2023-08-18 DIAGNOSIS — E84.8 DIABETES MELLITUS RELATED TO CYSTIC FIBROSIS: Chronic | ICD-10-CM

## 2023-08-18 DIAGNOSIS — E84.9 CYSTIC FIBROSIS: ICD-10-CM

## 2023-08-18 PROCEDURE — 12001 LACERATION REPAIR: ICD-10-PCS | Mod: S$GLB,,, | Performed by: SURGERY

## 2023-08-18 PROCEDURE — 12001 RPR S/N/AX/GEN/TRNK 2.5CM/<: CPT | Mod: S$GLB,,, | Performed by: SURGERY

## 2023-08-18 PROCEDURE — 99214 PR OFFICE/OUTPT VISIT, EST, LEVL IV, 30-39 MIN: ICD-10-PCS | Mod: 25,S$GLB,, | Performed by: SURGERY

## 2023-08-18 PROCEDURE — 99214 OFFICE O/P EST MOD 30 MIN: CPT | Mod: 25,S$GLB,, | Performed by: SURGERY

## 2023-08-18 NOTE — PROCEDURES
Laceration Repair    Date/Time: 8/18/2023 5:00 PM    Performed by: Anabella Camacho MD  Authorized by: Anabella Camacho MD  Body area: upper extremity  Location details: right index finger  Laceration length: 1.5 cm  Foreign bodies: no foreign bodies  Tendon involvement: none  Nerve involvement: none  Vascular damage: no  Anesthesia: local infiltration (attempted but pt could not tolerate)    Anesthesia:  Local Anesthetic: lidocaine 1% without epinephrine  Anesthetic total: 0.1 mL    Patient sedated: no  Preparation: Patient was prepped and draped in the usual sterile fashion.  Irrigation solution: saline  Irrigation method: syringe  Amount of cleaning: standard  Debridement: none  Degree of undermining: none  Skin closure: glue  Dressing: non-stick sterile dressing

## 2023-08-18 NOTE — PROGRESS NOTES
"Subjective:      Patient ID: Olinda Lombardi is a 48 y.o. female.    Vitals:  height is 5' 1" (1.549 m) and weight is 59.5 kg (131 lb 2.8 oz). Her tympanic temperature is 99.6 °F (37.6 °C). Her blood pressure is 158/89 (abnormal) and her pulse is 110. Her respiration is 22 (abnormal) and oxygen saturation is 98%.     Chief Complaint: Laceration    Patient presents today with a laceration to the right pointer finger that occurred about an hour ago. Patient was cleaning her center consol and swiped her finger against a razor blade.      Laceration   The incident occurred 2 days ago. The laceration is located on the Right arm. The laceration is 5 cm in size. The laceration mechanism was a razor. The pain is at a severity of 5/10. The pain is moderate. The pain has been Constant since onset.       Skin:  Positive for laceration. Negative for erythema.      Objective:     Physical Exam   Constitutional: She is oriented to person, place, and time. She appears well-developed.   HENT:   Head: Normocephalic and atraumatic. Head is without abrasion, without contusion and without laceration.   Ears:   Right Ear: External ear normal.   Left Ear: External ear normal.   Nose: Nose normal.   Mouth/Throat: Oropharynx is clear and moist and mucous membranes are normal.   Eyes: Conjunctivae, EOM and lids are normal. Pupils are equal, round, and reactive to light.   Neck: Trachea normal and phonation normal. Neck supple.   Cardiovascular: Normal rate, regular rhythm and normal heart sounds.   Pulmonary/Chest: Effort normal and breath sounds normal. No stridor. No respiratory distress.   Musculoskeletal: Normal range of motion.         General: Normal range of motion.        Hands:    Neurological: She is alert and oriented to person, place, and time.   Skin: Skin is warm, dry, intact and no rash. Capillary refill takes less than 2 seconds. No abrasion, No burn, No bruising, No erythema and No ecchymosis   Psychiatric: Her speech " is normal and behavior is normal. Judgment and thought content normal.   Nursing note and vitals reviewed.      Assessment:     1. Laceration of right index finger without foreign body without damage to nail, initial encounter    2. Cystic fibrosis    3. 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 Pt was unable to tolerate local lidocaine infiltration and suture repair as recommended

## 2023-09-11 DIAGNOSIS — E84.8 DIABETES MELLITUS RELATED TO CYSTIC FIBROSIS: ICD-10-CM

## 2023-09-11 DIAGNOSIS — E08.9 DIABETES MELLITUS RELATED TO CYSTIC FIBROSIS: ICD-10-CM

## 2023-09-12 DIAGNOSIS — E08.9 DIABETES MELLITUS RELATED TO CYSTIC FIBROSIS: Primary | ICD-10-CM

## 2023-09-12 DIAGNOSIS — E84.8 DIABETES MELLITUS RELATED TO CYSTIC FIBROSIS: Primary | ICD-10-CM

## 2023-09-12 RX ORDER — INSULIN ASPART INJECTION 100 [IU]/ML
10 INJECTION, SOLUTION SUBCUTANEOUS
Qty: 5 PEN | Refills: 11 | Status: SHIPPED | OUTPATIENT
Start: 2023-09-12

## 2023-09-22 RX ORDER — BLOOD SUGAR DIAGNOSTIC
1 STRIP MISCELLANEOUS
Qty: 100 EACH | Refills: 11 | Status: SHIPPED | OUTPATIENT
Start: 2023-09-22

## 2023-10-16 ENCOUNTER — PATIENT MESSAGE (OUTPATIENT)
Dept: DIABETES | Facility: CLINIC | Age: 48
End: 2023-10-16

## 2023-10-16 ENCOUNTER — CLINICAL SUPPORT (OUTPATIENT)
Dept: DIABETES | Facility: CLINIC | Age: 48
End: 2023-10-16
Payer: COMMERCIAL

## 2023-10-16 DIAGNOSIS — E84.8 DIABETES MELLITUS RELATED TO CYSTIC FIBROSIS: Chronic | ICD-10-CM

## 2023-10-16 DIAGNOSIS — E08.9 DIABETES MELLITUS RELATED TO CYSTIC FIBROSIS: Chronic | ICD-10-CM

## 2023-10-16 PROCEDURE — 99999 PR PBB SHADOW E&M-EST. PATIENT-LVL I: CPT | Mod: PBBFAC,,, | Performed by: DIETITIAN, REGISTERED

## 2023-10-16 PROCEDURE — G0108 DIAB MANAGE TRN  PER INDIV: HCPCS | Mod: S$GLB,,, | Performed by: DIETITIAN, REGISTERED

## 2023-10-16 PROCEDURE — G0108 PR DIAB MANAGE TRN  PER INDIV: ICD-10-PCS | Mod: S$GLB,,, | Performed by: DIETITIAN, REGISTERED

## 2023-10-16 PROCEDURE — 99999 PR PBB SHADOW E&M-EST. PATIENT-LVL I: ICD-10-PCS | Mod: PBBFAC,,, | Performed by: DIETITIAN, REGISTERED

## 2023-10-16 NOTE — PROGRESS NOTES
Diabetes Care Specialist Progress Note  Author: Brenda Jorge RD, CDE  Date: 10/16/2023    Program Intake  Reason for Diabetes Program Visit:: Initial Diabetes Assessment  Current diabetes risk level:: moderate  In the last 12 months, have you:: used emergency room services  Was the ER or hospital admission related to diabetes?: No    Lab Results   Component Value Date    HGBA1C 6.4 (H) 12/03/2019       Clinical    Problem Review  Reviewed Problem List with Patient: yes  Active comorbidities affecting diabetes self-care.: yes  Comorbidities: Other (comment) (CF)  Reviewed health maintenance: yes    Clinical Assessment  Current Diabetes Treatment: Insulin  Have you ever experienced hypoglycemia (low blood sugar)?: yes  Are you able to tell when your blood sugar is low?: Yes  What symptoms do you experience?: Shaky (Sometimes will get symptoms)  How do you treat hypoglycemia (low blood sugar)?: 1/2 can soda/fruit juice, 5-6 pieces of hard candy  Have you ever experienced hyperglycemia (high blood sugar)?: yes  Are you able to tell when your blood sugar is high?: (P) Yes  What are your symptoms?: (P) fatigue    Medication Information  How do you obtain your medications?: (P) Patient drives  How many days a week do you miss your medications?: (P) Never  Do you sometimes have difficulty refilling your medications?: (P) No  Medication adherence impacting ability to self-manage diabetes?: (P) No    Labs  Do you have regular lab work to monitor your medications?: (P) Yes    Nutritional Status  Diet: (P) Regular    Additional Social History    Support  Does anyone support you with your diabetes care?: yes  Who supports you?: spouse, self  Who takes you to your medical appointments?: self  Does the current support meet the patient's needs?: Yes  Is Support an area impacting ability to self-manage diabetes?: No    Access to Mass Media & Technology  Does the patient have access to any of the following devices or technologies?:  Smart phone  Media or technology needs impacting ability to self-manage diabetes?: No    Cognitive/Behavioral Health  Alert and Oriented: Yes  Difficulty Thinking: No  Requires Prompting: No  Requires assistance for routine expression?: No  Cognitive or behavioral barriers impacting ability to self-manage diabetes?: No    Culture/Sabianist  Culture or Adventist beliefs that may impact ability to access healthcare: No    Communication  Language preference: English  Hearing Problems: No  Vision Problems: No  Communication needs impacting ability to self-manage diabetes?: No    Health Literacy  Preferred Learning Method: Face to Face  How often do you need to have someone help you read instructions, pamphlets, or written material from your doctor or pharmacy?: Never  Health literacy needs impacting ability to self-manage diabetes?: No      Diabetes Self-Management Skills Assessment    Diabetes Disease Process/Treatment Options  Diabetes Disease Process/Treatment Options: Skills Assessment Completed: No  Deferred due to:: Time    Nutrition/Healthy Eating  Method of carbohydrate measurement:: no method  Patient can identify foods that impact blood sugar.: yes  Patient-identified foods:: sweets, starches (bread, pasta, rice, cereal), soda, fruit/fruit juice  Nutrition/Healthy Eating Skills Assessment Completed:: Yes  Assessment indicates:: Instruction Needed  Area of need?: Yes    Physical Activity/Exercise  Physical Activity/Exercise Skills Assessment Completed: : No  Deffered due to:: Time    Medications  Patient is able to describe current diabetes management routine.: yes  Diabetes management routine:: insulin  Patient is able to identify current diabetes medications, dosages, and appropriate timing of medications.: yes  Patient understands the purpose of the medications taken for diabetes.: yes  Patient reports problems or concerns with current medication regimen.: yes  Medication regimen problems/concerns:: does not  feel like regimen is working  Medication Skills Assessment Completed:: Yes  Assessment indicates:: Instruction Needed  Area of need?: Yes    Home Blood Glucose Monitoring  Patient states that blood sugar is checked at home daily.: yes  Monitoring Method:: personal continuous glucose monitor  Personal CGM type:: Federico 3  Patient is able to use personal CGM appropriately.: yes  Home Blood Glucose Monitoring Skills Assessment Completed: : Yes  Assessment indicates:: Instruction Needed  Area of need?: Yes    Acute Complications  Acute Complications Skills Assessment Completed: : No  Deffered due to:: Time    Chronic Complications  Chronic Complications Skills Assessment Completed: : No  Deferred due to:: Time    Psychosocial/Coping  Patient can identify ways of coping with chronic disease.: yes  Patient-stated ways of coping with chronic disease:: support from loved ones  Psychosocial/Coping Skills Assessment Completed: : Yes  Assessment indicates:: Adequate understanding  Area of need?: No      Assessment Summary and Plan    Based on today's diabetes care assessment, the following areas of need were identified:          10/16/2023    12:01 AM   Social   Support No   Access to Mass Media/Tech No   Cognitive/Behavioral Health No   Culture/Mosque No   Communication No   Health Literacy No            10/16/2023    12:01 AM   Clinical   Medication Adherence No            10/16/2023    12:01 AM   Diabetes Self-Management Skills   Nutrition/Healthy Eating Yes - reviewed CHO counting, label reading and addt'l resources to assist w/ CHO counting; plate method reviewed; discussed the importance of trying to be consistent with CHO intake at meals when taking a fixed dose of insulin - eating different amounts of CHO at meals will lead to BG variability with same dose of insulin   Medication Yes - advised to just give set dose before the meals and not to give correction doses after meals. Patient often gives correction doses 1-2  hours after eating if BGs are too high and then she bottoms out, then overtreats and BGs go too high and ends up with a cycle of ups and downs throughout the day.    Home Blood Glucose Monitoring Yes - sent patient a Libreview request to link to clinic for BG review.   Psychosocial/Coping No          Today's interventions were provided through individual discussion, instruction, and written materials were provided.      Patient verbalized understanding of instruction and written materials.  Pt was able to return back demonstration of instructions today. Patient understood key points, needs reinforcement and further instruction.     Diabetes Self-Management Care Plan:    Today's Diabetes Self-Management Care Plan was developed with Olinda's input. Olinda has agreed to work toward the following goal(s) to improve his/her overall diabetes control.      Care Plan: Diabetes Management   Updates made since 9/16/2023 12:00 AM        Problem: Medications         Goal: Patient Agrees to take Diabetes Medication(s) as prescribed.    Start Date: 10/16/2023   Expected End Date: 1/16/2024   Priority: High   Barriers: No Barriers Identified        Task: Reviewed with patient all current diabetes medications and provided basic review of the purpose, dosage, frequency, side effects, and storage of both oral and injectable diabetes medications. Completed 10/16/2023       Follow Up Plan     Follow up in about 3 months (around 1/16/2024).    Today's care plan and follow up schedule was discussed with patient.  Olinda verbalized understanding of the care plan, goals, and agrees to follow up plan.        The patient was encouraged to communicate with his/her health care provider/physician and care team regarding his/her condition(s) and treatment.  I provided the patient with my contact information today and encouraged to contact me via phone or [facility] Patient Portal as needed.     Length of Visit   Total Time: 60 Minutes

## 2023-12-22 ENCOUNTER — OFFICE VISIT (OUTPATIENT)
Dept: URGENT CARE | Facility: CLINIC | Age: 48
End: 2023-12-22
Payer: COMMERCIAL

## 2023-12-22 VITALS
DIASTOLIC BLOOD PRESSURE: 89 MMHG | WEIGHT: 138.25 LBS | HEIGHT: 61 IN | BODY MASS INDEX: 26.1 KG/M2 | RESPIRATION RATE: 17 BRPM | SYSTOLIC BLOOD PRESSURE: 147 MMHG | HEART RATE: 92 BPM | TEMPERATURE: 98 F | OXYGEN SATURATION: 98 %

## 2023-12-22 DIAGNOSIS — R22.0 SWELLING OF GUMS: ICD-10-CM

## 2023-12-22 DIAGNOSIS — K08.9 DENTAL DISORDER: Primary | ICD-10-CM

## 2023-12-22 PROCEDURE — 99214 OFFICE O/P EST MOD 30 MIN: CPT | Mod: S$GLB,,, | Performed by: PHYSICIAN ASSISTANT

## 2023-12-22 PROCEDURE — 87076 CULTURE ANAEROBE IDENT EACH: CPT | Performed by: PHYSICIAN ASSISTANT

## 2023-12-22 PROCEDURE — 87075 CULTR BACTERIA EXCEPT BLOOD: CPT | Performed by: PHYSICIAN ASSISTANT

## 2023-12-22 PROCEDURE — 99214 PR OFFICE/OUTPT VISIT, EST, LEVL IV, 30-39 MIN: ICD-10-PCS | Mod: S$GLB,,, | Performed by: PHYSICIAN ASSISTANT

## 2023-12-22 RX ORDER — CHLORHEXIDINE GLUCONATE ORAL RINSE 1.2 MG/ML
SOLUTION DENTAL
Qty: 473 ML | Refills: 0 | Status: SHIPPED | OUTPATIENT
Start: 2023-12-22

## 2023-12-22 RX ORDER — AZITHROMYCIN 250 MG/1
250 TABLET, FILM COATED ORAL
COMMUNITY
Start: 2023-11-07 | End: 2023-12-27 | Stop reason: ALTCHOICE

## 2023-12-22 RX ORDER — IBUPROFEN 800 MG/1
800 TABLET ORAL EVERY 6 HOURS PRN
Qty: 30 TABLET | Refills: 0 | Status: SHIPPED | OUTPATIENT
Start: 2023-12-22 | End: 2024-01-01

## 2023-12-22 NOTE — PROGRESS NOTES
"Subjective:      Patient ID: Olinda Lombardi is a 48 y.o. female.    Vitals:  height is 5' 1" (1.549 m) and weight is 62.7 kg (138 lb 3.7 oz). Her oral temperature is 98.4 °F (36.9 °C). Her blood pressure is 147/89 (abnormal) and her pulse is 92. Her respiration is 17 and oxygen saturation is 98%.     Chief Complaint: Abscess    Olinda Lombardi is a 48 y.o. female who complains of mouth abscess (on lt side top row gum line); onset approx a month ago. Pt reports she got a dental work done and the abscess showed up after. Pt c/o swelling, redness,  burning sensation and white discharge. Pt has hx cystic fibrosis;on Trikafta. She developed 21 cavities and root canal after and attributes it to the trikafta. She states trikafta is a miracle drug; has not coughed for years.    Pt is requesting a culture of the abscess. Pt denies pain.     Patient was given clindamycin 300 mg every 6 hours on 11/20/23. She was seen by her endodontist 2 days ago; did imaging with her doctor and saw a Metal titanium post in her mouth. She wasn't aware that a metal post was being put into her mouth.She looked on pub med and saw that staph aureus has been reported in dental infections with patients with CF. She was told by her dentist and endodontist that cultures are not done in dentistry.          Abscess  Chronicity:  New  Onset:  7 days or greater  Progression Since Onset: gradually worsening  Location:  Mouth  Associated Symptoms: no fever, no chills, no sweats  Characteristics: draining, painful, redness and swelling    Characteristics: no itching, no dryness, no scaling, no peeling, no bruising and no blistering    Pain Scale:  0/10  Treatments Tried:  Oral antibiotics  Relieved by:  Nothing  Worsened by:  Draining/squeezing      Constitution: Negative for chills and fever.   Skin:  Positive for abscess.      Objective:     Physical Exam   Constitutional: She is oriented to person, place, and time.      Comments:Patient is " awake and alert, sitting up in exam chair, speaking and answering in complete sentences, in no signs of acute distress     normal  HENT:   Head: Normocephalic and atraumatic.   Ears:   Right Ear: Tympanic membrane, external ear and ear canal normal.   Left Ear: Tympanic membrane, external ear and ear canal normal.   Nose: Nose normal.   Mouth/Throat: Mucous membranes are moist. Oropharynx is clear.      Comments: Erythematous papule to gums above tooth 12; no exudate noted; only gingival tissue seen when patient tried to push and express pus  Eyes: Conjunctivae are normal. Pupils are equal, round, and reactive to light. Extraocular movement intact   Neck: Neck supple.   Cardiovascular: Normal rate, regular rhythm, normal heart sounds and normal pulses.   Pulmonary/Chest: Effort normal and breath sounds normal.   Abdominal: Normal appearance.   Musculoskeletal: Normal range of motion.         General: Normal range of motion.   Neurological: She is alert and oriented to person, place, and time.   Skin: Skin is warm. Capillary refill takes less than 2 seconds.   Psychiatric: Her behavior is normal. Mood, judgment and thought content normal.   Nursing note and vitals reviewed.            Assessment:     1. Dental disorder    2. Swelling of gums      Patient presents with clinical exam findings and history consistent with above.      On exam, patient is nontoxic appearing and vitals are stable.      Diagnostic testing results were reviewed and discussed with patient/guardian.   Tests ordered in clinic:  Culture anerobe  Previous progress notes/admissions/labs and medications were reviewed.      Plan:   Discussed with patient that antibiotics will be started if there is bacterial growth. PCN and clindamycin are appropriate antibiotics for dental infections.    Dental disorder  -     CULTURE, ANAEROBE  -     chlorhexidine (PERIDEX) 0.12 % solution; Swish for 30 seconds with 15 mL (one capful) of undiluted oral rinse after  "toothbrushing, then expectorate; repeat twice daily (morning and evening) until symptoms resolve. Use in addition to regular dental prophylaxis (cleaning).  Dispense: 473 mL; Refill: 0    Swelling of gums  -     ibuprofen (ADVIL,MOTRIN) 800 MG tablet; Take 1 tablet (800 mg total) by mouth every 6 (six) hours as needed for Pain.  Dispense: 30 tablet; Refill: 0                    1) See orders for this visit as documented in the electronic medical record.  2) Symptomatic therapy suggested: use acetaminophen/ibuprofen every 6-8 hours prn pain or fever, push fluids.   3) Call or return to clinic prn if these symptoms worsen or fail to improve as anticipated.    Discussed results/diagnosis/plan with patient in clinic.  We had shared decision making for patient's treatment. Patient verbalized understanding and in agreement with current treatment plan.     Patient was instructed to return for re-evaluation with urgent care or PCP for continued outpatient workup and management if symptoms do not improve/worsening symptoms. Strict ED versus clinic precautions given in depth.    Discharge and follow-up instructions given verbally/printed with the patient who expressed understanding. [facility] instructions and [facility]              [facility] "Sanam" HALIMA Garcia          Patient Instructions   Please see your dentist for tooth concerns.     If not allergic, take Tylenol (Acetaminophen) 650 mg to  1 g every 6 hours as needed for fever and/or Motrin (Ibuprofen) 600 to 800 mg every 6 hours as needed for fever as directed for control of pain and/or fever      Please remember that you have received care at an urgent care today. Urgent cares are not emergency rooms and are not equipped to handle life threatening emergencies and cannot rule in or out certain medical conditions and you may be released before all of your medical problems are known or treated. Please arrange follow up with your primary care physician " or speciality clinic  within 2-5 days if your signs and symptoms have not resolved or worsen. Patient can call our Referral Hotline at [phone] to make an appointment.    Please return here or go to the Emergency Department for any concerns or worsening of condition.Patient was educated on signs/symptoms that would warrant emergent medical attention. Patient verbalized understanding.  You have a fever of 100.4°F (38°C) or higher or chills.  You have swelling or pain in your neck or throat.  You are not able to open your mouth or eat.  You have trouble breathing.  You have very bad pain that is not helped by pain medicines.  You have swelling in your gums or face.  You have discharge around the tooth.  You have a bad taste in your mouth.  You have lots of bleeding from the gums.  You have more pain after having a tooth pulled.

## 2023-12-22 NOTE — PATIENT INSTRUCTIONS
Please see your dentist for tooth concerns.     If not allergic, take Tylenol (Acetaminophen) 650 mg to  1 g every 6 hours as needed for fever and/or Motrin (Ibuprofen) 600 to 800 mg every 6 hours as needed for fever as directed for control of pain and/or fever      Please remember that you have received care at an urgent care today. Urgent cares are not emergency rooms and are not equipped to handle life threatening emergencies and cannot rule in or out certain medical conditions and you may be released before all of your medical problems are known or treated. Please arrange follow up with your primary care physician or speciality clinic  within 2-5 days if your signs and symptoms have not resolved or worsen. Patient can call our Referral Hotline at [phone] to make an appointment.    Please return here or go to the Emergency Department for any concerns or worsening of condition.Patient was educated on signs/symptoms that would warrant emergent medical attention. Patient verbalized understanding.  You have a fever of 100.4°F (38°C) or higher or chills.  You have swelling or pain in your neck or throat.  You are not able to open your mouth or eat.  You have trouble breathing.  You have very bad pain that is not helped by pain medicines.  You have swelling in your gums or face.  You have discharge around the tooth.  You have a bad taste in your mouth.  You have lots of bleeding from the gums.  You have more pain after having a tooth pulled.

## 2023-12-27 ENCOUNTER — TELEPHONE (OUTPATIENT)
Dept: URGENT CARE | Facility: CLINIC | Age: 48
End: 2023-12-27
Payer: COMMERCIAL

## 2023-12-27 DIAGNOSIS — L02.91 ABSCESS: Primary | ICD-10-CM

## 2023-12-27 LAB
BACTERIA SPEC ANAEROBE CULT: ABNORMAL
BACTERIA SPEC ANAEROBE CULT: ABNORMAL

## 2023-12-27 RX ORDER — METRONIDAZOLE 500 MG/1
500 TABLET ORAL EVERY 12 HOURS
Qty: 20 TABLET | Refills: 0 | Status: SHIPPED | OUTPATIENT
Start: 2023-12-27 | End: 2024-01-15

## 2023-12-27 RX ORDER — METRONIDAZOLE 500 MG/1
500 TABLET ORAL EVERY 12 HOURS
Qty: 20 TABLET | Refills: 0 | Status: SHIPPED | OUTPATIENT
Start: 2023-12-27 | End: 2023-12-27

## 2023-12-28 NOTE — TELEPHONE ENCOUNTER
Culture came back + for FUSOBACTERIUM NUCLEATUM and PREVOTELLA SPECIES . Considered normal oral demetrio except in opportunistic infection.   Patient is  penicillin allergic and has already done a course of clinda. Will try flagyl. There were only a few organisms isolated. No susceptibility available. Patient requesting.

## 2024-01-05 ENCOUNTER — TELEPHONE (OUTPATIENT)
Dept: ENDOCRINOLOGY | Facility: CLINIC | Age: 49
End: 2024-01-05
Payer: COMMERCIAL

## 2024-01-05 NOTE — TELEPHONE ENCOUNTER
Called patient to reschedule appointment for today, provider is out sick. No answer, left message to call us back to reschedule.

## 2024-06-27 ENCOUNTER — OFFICE VISIT (OUTPATIENT)
Dept: INTERNAL MEDICINE | Facility: CLINIC | Age: 49
End: 2024-06-27
Payer: COMMERCIAL

## 2024-06-27 VITALS
RESPIRATION RATE: 18 BRPM | SYSTOLIC BLOOD PRESSURE: 144 MMHG | OXYGEN SATURATION: 98 % | HEART RATE: 96 BPM | BODY MASS INDEX: 24.64 KG/M2 | WEIGHT: 130.5 LBS | DIASTOLIC BLOOD PRESSURE: 72 MMHG | TEMPERATURE: 98 F | HEIGHT: 61 IN

## 2024-06-27 DIAGNOSIS — Z86.018 HISTORY OF UTERINE FIBROID: ICD-10-CM

## 2024-06-27 DIAGNOSIS — E84.9 CYSTIC FIBROSIS: ICD-10-CM

## 2024-06-27 DIAGNOSIS — E08.9 DIABETES MELLITUS RELATED TO CYSTIC FIBROSIS: ICD-10-CM

## 2024-06-27 DIAGNOSIS — F98.8 ATTENTION DEFICIT DISORDER, UNSPECIFIED HYPERACTIVITY PRESENCE: ICD-10-CM

## 2024-06-27 DIAGNOSIS — D50.0 IRON DEFICIENCY ANEMIA DUE TO CHRONIC BLOOD LOSS: ICD-10-CM

## 2024-06-27 DIAGNOSIS — Z00.00 ENCOUNTER FOR ANNUAL HEALTH EXAMINATION: Primary | ICD-10-CM

## 2024-06-27 DIAGNOSIS — Z12.31 ENCOUNTER FOR SCREENING MAMMOGRAM FOR BREAST CANCER: ICD-10-CM

## 2024-06-27 DIAGNOSIS — E84.8 DIABETES MELLITUS RELATED TO CYSTIC FIBROSIS: ICD-10-CM

## 2024-06-27 DIAGNOSIS — F41.9 ANXIETY: ICD-10-CM

## 2024-06-27 PROCEDURE — 99999 PR PBB SHADOW E&M-EST. PATIENT-LVL V: CPT | Mod: PBBFAC,,, | Performed by: NURSE PRACTITIONER

## 2024-06-27 NOTE — PROGRESS NOTES
" Patient ID: Olinda Lombardi is a 49 y.o. female.    Chief Complaint: Annual Wellness Visit    Olinda Lombardi is a 49 y.o. female who presents today for an annual wellness visit.     She voices concern for left back pain and pelvic mass.      Back pain  Pt. states she started with left mid back pain Friday.  She state she has previously, 3 years ago, had kidney stones she passed.  She denies hematuria, urgency or dysuria.      Pelvic mass  Pt. states she feels a "golf ball size" mass in her right lower abdomen.  Pt. currently still having menstrual cycles-LMP 6/25/24.  She states cycles are normally heavy, but has recently been lighter.  Has previously had abnormal PAP.        Patient requesting extensive laboratory work up including hormone testing. Patient advised that GYN would need to order hormone testing and further discussion of additional tests would need a follow up visit.     Patient is currently under psychiatry care and recently had changed made to her Zoloft and her ADHD medications.     Patient with history of DM secondary to CF - needs f/u with Endo. Does not monitor glucose at home because highs and lows cause anxiety.     Review of patient's allergies indicates:   Allergen Reactions    Milk containing products (dairy) Diarrhea    Penicillin     Soy Diarrhea    Strawberry       Medication List with Changes/Refills   New Medications    ALPRAZOLAM (XANAX) 0.5 MG TABLET    Take 1 to 2 tablets by mouth at bedtime as needed for anxiety.   Current Medications    ALPRAZOLAM (XANAX) 0.5 MG TABLET    Take 1-2 tablets by mouth at bedtime as needed for anxiety    ARIPIPRAZOLE (ABILIFY) 2 MG TAB    Take 1 tablet by mouth once a day    BISMUTH SUBSALICYLATE (PINK BISMUTH ORAL)    Take by mouth.    BLOOD SUGAR DIAGNOSTIC STRP    To check BG 10 times daily, to use with insurance preferred meter    BLOOD-GLUCOSE METER KIT    To check BG 10  times daily, to use with insurance preferred meter    " "BLOOD-GLUCOSE SENSOR (FREESTYLE SANTI 3 SENSOR) MEENA    2 each by Misc.(Non-Drug; Combo Route) route every 14 (fourteen) days.    CHLORHEXIDINE (PERIDEX) 0.12 % SOLUTION    Swish for 30 seconds with 15 mL (one capful) of undiluted oral rinse after toothbrushing, then expectorate; repeat twice daily (morning and evening) until symptoms resolve. Use in addition to regular dental prophylaxis (cleaning).    CYANOCOBALAMIN (VITAMIN B-12) 1000 MCG TABLET    Take 100 mcg by mouth once daily.    DEXTROAMPHETAMINE-AMPHETAMINE (ADDERALL) 5 MG TAB    Take 1 tablet by mouth once a day as needed in the afternoon    DEXTROAMPHETAMINE-AMPHETAMINE (MYDAYIS) 37.5 MG CT24    Take 1 capsule by mouth once a day    DEXTROAMPHETAMINE-AMPHETAMINE (MYDAYIS) 50 MG CT24    Take 1 capsule by mouth once a day    FERROUS SULFATE 325 (65 FE) MG EC TABLET    Take 325 mg by mouth once daily.    FLASH GLUCOSE SCANNING READER (FREESTYLE SANTI 14 DAY READER) MISC    1 each by Misc.(Non-Drug; Combo Route) route once daily.    INSULIN ASPART, NIACINAMIDE, (FIASP FLEXTOUCH U-100 INSULIN) 100 UNIT/ML (3 ML) INPN    Inject 10 Units into the skin 3 (three) times daily with meals.    LEVOCETIRIZINE (XYZAL) 5 MG TABLET    Take 5 mg by mouth daily as needed.     LIPASE-PROTEASE-AMYLASE (ZENPEP) 40,000-126,000- 168,000 UNIT CPDR    take 4 capsules by mouth with meals and 2 capsules with snacks for 30 days    LIPASE-PROTEASE-AMYLASE (ZENPEP) 40,000-126,000- 168,000 UNIT CPDR    Take 4-5 capsules by mouth with meals 3 times a day, also, take 2-3 capsules by mouth with snacks 3 times a day.    MAGNESIUM OXIDE (MAG-OX) 250 MG MAGNESIUM TAB    Take by mouth once.    PANTOPRAZOLE (PROTONIX) 40 MG TABLET    Take 1 tablet (40 mg total) by mouth once daily.    PEN NEEDLE, DIABETIC (NOVOFINE 32) 32 GAUGE X 1/4" NDLE    Inject 1 each into the skin 3 (three) times daily before meals.    SERTRALINE (ZOLOFT) 100 MG TABLET    Take 2.5 tablet by mouth once a day    SERTRALINE " (ZOLOFT) 100 MG TABLET    Take 2.5 tablet by mouth once a day    SERTRALINE (ZOLOFT) 100 MG TABLET    Take 2.5 tablets (250 mg total) by mouth once daily.    SERTRALINE (ZOLOFT) 100 MG TABLET    Take 2 tablets by mouth once a day. Take in addition to 50 mg tablet.    SERTRALINE (ZOLOFT) 50 MG TABLET    Take 1 tablet by mouth once a day. Take in addition to 100 mg tablets.    TRAZODONE (DESYREL) 100 MG TABLET    Take 1 tablet by mouth nightly as needed.    TRIKAFTA 100-50-75 MG(D) /150 MG (N) TBSQ    TAKE 2 ORANGE TABLETS IN THE MORNING AND 1 BLUE TABLET IN THE EVENING APPROXIMATELY 12 HOURS APART. TAKE WITH FAT-CONTAINING FOOD    ZENPEP 40,000-126,000- 168,000 UNIT CPDR    TAKE 4 CAPSULES THREE TIMES A DAY WITH MEALS   Discontinued Medications    ALPRAZOLAM (XANAX) 0.5 MG TABLET    Take 1-2 tablet by mouth at bedtime as needed for anxiety    ALPRAZOLAM (XANAX) 0.5 MG TABLET    Take 1-2 tablet by mouth at bedtime as needed for anxiety    ALPRAZOLAM (XANAX) 0.5 MG TABLET    Take 1-2 tablet by mouth at bedtime as needed for anxiety    ARIPIPRAZOLE (ABILIFY) 2 MG TAB    Take 1 tablet (2 mg total) by mouth once daily.    DEXTROAMPHETAMINE-AMPHETAMINE (ADDERALL) 5 MG TAB    Take 1 tablet by mouth once a day as needed in the afternoon     Health Maintenance         Date Due Completion Date    Mammogram 05/29/2019 5/29/2018    COVID-19 Vaccine (4 - 2023-24 season) 09/01/2023 12/23/2021    HIV Screening 06/29/2024 (Originally 4/2/1990) ---    Hepatitis C Screening 06/27/2025 (Originally 1975) ---    Influenza Vaccine (Season Ended) 09/01/2024 12/17/2018    Cervical Cancer Screening 02/13/2025 2/13/2020    Colorectal Cancer Screening 12/18/2028 12/18/2018    Lipid Panel 06/27/2029 6/27/2024    TETANUS VACCINE 09/28/2031 9/28/2021          On average, how many days per week do you do moderate to strenuous exercise:  (like a brisk walk or jog; this does not include your job/work)  0     Do you eat fruits and vegetable every  "day?  Often, but not daily    Have you ever used tobacco products? No    Have you ever been screened for HIV? No  Would you like to be screened for HIV? No    Do you go to the dentist regularly? No  When was you last exam: 2023    Do you go to the eye doctor regularly? No  When was your last exam: 2021    Do you wear contacts, glasses, reading glasses? Yes    Have you often been bothered by feeling down, depressed, or hopeless?  several days    How often do you drink alcohol?  Very rarely    Review of Systems   Constitutional:  Positive for fatigue.   Respiratory:  Negative for cough, chest tightness, shortness of breath and wheezing.    Cardiovascular:  Positive for leg swelling. Negative for chest pain.   Gastrointestinal:  Negative for abdominal pain, constipation, diarrhea, nausea and vomiting.   Genitourinary:  Positive for menstrual irregularity and vaginal bleeding. Negative for dysuria, frequency, hematuria and urgency.   Neurological:  Negative for headaches.   Psychiatric/Behavioral:  Positive for dysphoric mood. The patient is nervous/anxious.       Objective:     BP (!) 144/72 (BP Location: Right arm, Patient Position: Sitting, BP Method: Medium (Manual))   Pulse 96   Temp 98.1 °F (36.7 °C) (Temporal)   Resp 18   Ht 5' 1" (1.549 m)   Wt 59.2 kg (130 lb 8.2 oz)   SpO2 98%   BMI 24.66 kg/m²     Physical Exam  Constitutional:       Appearance: Normal appearance.   HENT:      Head: Normocephalic and atraumatic.   Eyes:      Conjunctiva/sclera: Conjunctivae normal.   Cardiovascular:      Rate and Rhythm: Normal rate and regular rhythm.      Pulses: Normal pulses.   Pulmonary:      Effort: Pulmonary effort is normal.      Breath sounds: Normal breath sounds.   Abdominal:      Palpations: Abdomen is soft. There is no mass.      Tenderness: There is no abdominal tenderness. There is no right CVA tenderness or left CVA tenderness.   Musculoskeletal:         General: Normal range of motion.      Cervical " back: Normal range of motion and neck supple.   Neurological:      Mental Status: She is alert. Mental status is at baseline.   Psychiatric:         Mood and Affect: Mood normal.         Speech: Speech is rapid and pressured.         Behavior: Behavior is hyperactive.       BP Readings from Last 3 Encounters:   06/27/24 (!) 144/72   12/22/23 (!) 147/89   08/18/23 (!) 158/89     Wt Readings from Last 3 Encounters:   06/27/24 59.2 kg (130 lb 8.2 oz)   12/22/23 62.7 kg (138 lb 3.7 oz)   08/18/23 59.5 kg (131 lb 2.8 oz)       I reviewed and independently interpreted the labs and imaging below:  Last Labs:  Glucose   Date Value Ref Range Status   06/27/2024 194 (H) 70 - 110 mg/dL Final   05/05/2022 106 70 - 110 mg/dL Final     BUN   Date Value Ref Range Status   06/27/2024 13 6 - 20 mg/dL Final   05/05/2022 10 6 - 20 mg/dL Final     Creatinine   Date Value Ref Range Status   06/27/2024 0.8 0.5 - 1.4 mg/dL Final   05/05/2022 0.7 0.5 - 1.4 mg/dL Final     Sodium   Date Value Ref Range Status   06/27/2024 138 136 - 145 mmol/L Final     Potassium   Date Value Ref Range Status   06/27/2024 4.6 3.5 - 5.1 mmol/L Final     AST   Date Value Ref Range Status   06/27/2024 23 10 - 40 U/L Final     ALT   Date Value Ref Range Status   06/27/2024 38 10 - 44 U/L Final     eGFR if    Date Value Ref Range Status   05/05/2022 >60.0 >60 mL/min/1.73 m^2 Final     eGFR if non    Date Value Ref Range Status   05/05/2022 >60.0 >60 mL/min/1.73 m^2 Final     Comment:     Calculation used to obtain the estimated glomerular filtration  rate (eGFR) is the CKD-EPI equation.        Cholesterol   Date Value Ref Range Status   06/27/2024 193 120 - 199 mg/dL Final     Comment:     The National Cholesterol Education Program (NCEP) has set the  following guidelines (reference ranges) for Cholesterol:  Optimal.....................<200 mg/dL  Borderline High.............200-239 mg/dL  High........................> or = 240  mg/dL     03/21/2019 143 120 - 199 mg/dL Final     Comment:     The National Cholesterol Education Program (NCEP) has set the  following guidelines (reference ranges) for Cholesterol:  Optimal.....................<200 mg/dL  Borderline High.............200-239 mg/dL  High........................> or = 240 mg/dL       Hemoglobin A1C   Date Value Ref Range Status   12/03/2019 6.4 (H) 4.0 - 5.6 % Final     Comment:     ADA Screening Guidelines:  5.7-6.4%  Consistent with prediabetes  >or=6.5%  Consistent with diabetes  High levels of fetal hemoglobin interfere with the HbA1C  assay. Heterozygous hemoglobin variants (HbS, HgC, etc)do  not significantly interfere with this assay.   However, presence of multiple variants may affect accuracy.     09/18/2018 5.9 (H) 4.0 - 5.6 % Final     Comment:     ADA Screening Guidelines:  5.7-6.4%  Consistent with prediabetes  >or=6.5%  Consistent with diabetes  High levels of fetal hemoglobin interfere with the HbA1C  assay. Heterozygous hemoglobin variants (HbS, HgC, etc)do  not significantly interfere with this assay.   However, presence of multiple variants may affect accuracy.       Lab Results   Component Value Date    WBC 5.83 06/27/2024    HGB 12.4 06/27/2024    HCT 37.1 06/27/2024     06/27/2024     Lab Results   Component Value Date    TSH 1.194 06/27/2024       Assessment and Plan:     1. Encounter for annual health examination  --Nutrition: Discussed the importance of moderate caffeine intake. Adhering to a low sodium, low saturated fat/low cholesterol diet.   --Exercise: Discussed the importance of regular exercise. At least 30 minutes 5 days per week.   --Dental health: Discussed importance of regular tooth brushing, flossing, and dental visits.  --Immunizations reviewed.  --Discussed benefits of maintaining up to date health maintenance.  -- Encouraged good sleep hygiene.      - COMPREHENSIVE METABOLIC PANEL; Future  - CBC Without Differential; Future  - LIPID  PANEL; Future  - HEMOGLOBIN A1C; Future  - TSH; Future  - Iron and TIBC; Future  - Ferritin; Future  - Vitamin D; Future  - FOLATE; Future  - VITAMIN B1; Future  - Vitamin B12; Future  - Magnesium; Future  - PHOSPHORUS; Future  - Urinalysis, Reflex to Urine Culture Urine, Clean Catch; Future    2. 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 Attention deficit disorder, unspecified hyperactivity presence  4. Anxiety    Chronic, stable, continue current medications, follow up with Psychiatry     5. Cystic fibrosis    Chronic, stable, continue current medications, follow up with [facility]     6. Iron deficiency anemia due to chronic blood loss    Chronic, labs to assess stability     7. History of uterine fibroid  - US Transvaginal Non OB; Future  - FU with GYN as scheduled     8. Encounter for screening mammogram for breast cancer  - Mammo Digital Screening Bilat w/ Calixto; Future    Abbygail Jeansonne, BSN, RN, BMTCN  Family Nurse Practitioner Student       As the co-signing provider I have reviewed the students documentation and am responsible for the treatment, assessment, and plan.

## 2024-06-28 ENCOUNTER — PATIENT MESSAGE (OUTPATIENT)
Dept: INTERNAL MEDICINE | Facility: CLINIC | Age: 49
End: 2024-06-28
Payer: COMMERCIAL

## 2024-07-09 ENCOUNTER — HOSPITAL ENCOUNTER (OUTPATIENT)
Dept: RADIOLOGY | Facility: HOSPITAL | Age: 49
Discharge: HOME | End: 2024-07-09
Attending: NURSE PRACTITIONER
Payer: COMMERCIAL

## 2024-07-09 DIAGNOSIS — Z86.018 HISTORY OF UTERINE FIBROID: ICD-10-CM

## 2024-07-09 PROCEDURE — 76830 TRANSVAGINAL US NON-OB: CPT | Mod: TC

## 2024-08-28 DIAGNOSIS — E84.8 DIABETES MELLITUS RELATED TO CYSTIC FIBROSIS: ICD-10-CM

## 2024-08-28 DIAGNOSIS — E08.9 DIABETES MELLITUS RELATED TO CYSTIC FIBROSIS: ICD-10-CM

## 2024-09-01 ENCOUNTER — HOSPITAL ENCOUNTER (EMERGENCY)
Facility: HOSPITAL | Age: 49
Discharge: HOME | End: 2024-09-01
Attending: EMERGENCY MEDICINE
Payer: COMMERCIAL

## 2024-09-01 VITALS
OXYGEN SATURATION: 99 % | TEMPERATURE: 98 F | BODY MASS INDEX: 24.55 KG/M2 | HEIGHT: 61 IN | SYSTOLIC BLOOD PRESSURE: 133 MMHG | HEART RATE: 77 BPM | WEIGHT: 130 LBS | RESPIRATION RATE: 17 BRPM | DIASTOLIC BLOOD PRESSURE: 81 MMHG

## 2024-09-01 DIAGNOSIS — H57.12 LEFT EYE PAIN: Primary | ICD-10-CM

## 2024-09-01 DIAGNOSIS — S05.01XA ABRASION OF RIGHT CORNEA, INITIAL ENCOUNTER: ICD-10-CM

## 2024-09-01 PROCEDURE — 99283 EMERGENCY DEPT VISIT LOW MDM: CPT

## 2024-09-01 PROCEDURE — 25000003 PHARM REV CODE 250: Performed by: EMERGENCY MEDICINE

## 2024-09-01 RX ORDER — ERYTHROMYCIN 5 MG/G
OINTMENT OPHTHALMIC
Qty: 3.5 G | Refills: 0 | Status: SHIPPED | OUTPATIENT
Start: 2024-09-01 | End: 2024-09-02 | Stop reason: ALTCHOICE

## 2024-09-01 RX ORDER — PROPARACAINE HYDROCHLORIDE 5 MG/ML
1 SOLUTION/ DROPS OPHTHALMIC
Status: COMPLETED | OUTPATIENT
Start: 2024-09-01 | End: 2024-09-01

## 2024-09-01 RX ADMIN — PROPARACAINE HYDROCHLORIDE 1 DROP: 5 SOLUTION/ DROPS OPHTHALMIC at 05:09

## 2024-09-01 RX ADMIN — FLUORESCEIN SODIUM 1 EACH: 1 STRIP OPHTHALMIC at 05:09

## 2024-09-01 NOTE — ED PROVIDER NOTES
Source of History:  Patient  Chart    Chief complaint:  Eye Pain (L eye pain. Pt had a stye 2-3 weeks seen at optho and sent home with tobramycin and doxycycline. Tonight visible scratch on L cornea. Vision intact in affected eye. Denies floaters or black spots.  )      HPI:  Olinda Lombardi is a 49 y.o. female with history of cystic fibrosis, diabetes, pancreatic insufficiency, iron-deficiency anemia, presenting to emergency department with complaint of  left eye pain.      Patient states that he developed a stye to the inferior eyelid approximately 3 weeks ago.  She saw an optometrist in clinic, who put her on oral doxycycline and prescribed tobramycin eye drops.  She stopped wearing her contact lenses for 2 weeks, and then followed up in the optometrist clinic approximately 1 week ago.  At that time she was told that the stye had healed.    This evening she developed pain in the left eye.  She denies any trauma.  She has been wearing her contacts.  She thought she saw a scratch on the surface of the eye.  She denies any visual changes.  There is some redness of the eye, but minimal tearing.  No double vision.  No other complaints at this time.    Review of patient's allergies indicates:   Allergen Reactions    Milk containing products (dairy) Diarrhea    Penicillin     Soy Diarrhea    Strawberry        No current facility-administered medications on file prior to encounter.     Current Outpatient Medications on File Prior to Encounter   Medication Sig Dispense Refill    ALPRAZolam (XANAX) 0.5 MG tablet Take 1-2 tablets by mouth at bedtime as needed for anxiety (Patient not taking: Reported on 6/27/2024) 60 tablet 2    ALPRAZolam (XANAX) 0.5 MG tablet Take 1 to 2 tablets by mouth at bedtime as needed for anxiety. 60 tablet 0    ALPRAZolam (XANAX) 0.5 MG tablet Take 1-2 tablet by mouth at bedtime as needed for anxiety 60 tablet 0    ALPRAZolam (XANAX) 0.5 MG tablet Take 1-2 tablet by mouth at bedtime as needed  for anxiety 60 tablet 2    ARIPiprazole (ABILIFY) 2 MG Tab Take 1 tablet by mouth once a day (Patient not taking: Reported on 6/27/2024) 30 tablet 0    bismuth subsalicylate (PINK BISMUTH ORAL) Take by mouth.      blood sugar diagnostic Strp To check BG 10 times daily 300 strip 11    blood-glucose meter kit To check BG 10  times daily, to use with insurance preferred meter 1 each 0    blood-glucose sensor (FREESTYLE SANTI 3 SENSOR) Delmy 2 each by Misc.(Non-Drug; Combo Route) route every 14 (fourteen) days. 2 each 11    chlorhexidine (PERIDEX) 0.12 % solution Swish for 30 seconds with 15 mL (one capful) of undiluted oral rinse after toothbrushing, then expectorate; repeat twice daily (morning and evening) until symptoms resolve. Use in addition to regular dental prophylaxis (cleaning). 473 mL 0    cyanocobalamin (VITAMIN B-12) 1000 MCG tablet Take 100 mcg by mouth once daily.      dextroamphetamine-amphetamine (ADDERALL) 10 mg Tab Take 1 tablet by mouth once a day as needed 30 tablet 0    dextroamphetamine-amphetamine (ADDERALL) 10 mg Tab Take 1 tablet by mouth once a day as needed 30 tablet 0    dextroamphetamine-amphetamine (ADDERALL) 10 mg Tab Take 1 tablet by mouth once a day as needed 30 tablet 0    dextroamphetamine-amphetamine (ADDERALL) 5 mg Tab Take 1 tablet by mouth once a day as needed in the afternoon 30 tablet 0    dextroamphetamine-amphetamine (MYDAYIS) 37.5 mg CT24 Take 1 capsule by mouth once a day (Patient not taking: Reported on 6/27/2024) 5 each 0    dextroamphetamine-amphetamine (MYDAYIS) 50 mg CT24 Take 1 capsule by mouth once a day 30 each 0    dextroamphetamine-amphetamine (MYDAYIS) 50 mg CT24 Take 1 capsule by mouth once a day 30 each 0    ferrous sulfate 325 (65 FE) MG EC tablet Take 325 mg by mouth once daily.      flash glucose scanning reader (FREESTYLE SANTI 14 DAY READER) Misc 1 each by Misc.(Non-Drug; Combo Route) route once daily. 1 each 0    insulin aspart, niacinamide, (FIASP  "FLEXTOUCH U-100 INSULIN) 100 unit/mL (3 mL) InPn Inject 10 Units into the skin 3 (three) times daily with meals. 5 pen 11    levocetirizine (XYZAL) 5 MG tablet Take 5 mg by mouth daily as needed.  (Patient not taking: Reported on 6/27/2024)      lipase-protease-amylase (ZENPEP) 40,000-126,000- 168,000 unit CpDR take 4 capsules by mouth with meals and 2 capsules with snacks for 30 days 420 capsule 2    lipase-protease-amylase (ZENPEP) 40,000-126,000- 168,000 unit CpDR Take 4-5 capsules by mouth with meals 3 times a day, also, take 2-3 capsules by mouth with snacks 3 times a day. 700 capsule 12    magnesium oxide (MAG-OX) 250 mg magnesium Tab Take by mouth once.      pantoprazole (PROTONIX) 40 MG tablet Take 1 tablet (40 mg total) by mouth once daily. 30 tablet 0    pen needle, diabetic (NOVOFINE 32) 32 gauge x 1/4" Ndle Inject 1 each into the skin 3 (three) times daily before meals. 100 each 11    sertraline (ZOLOFT) 100 MG tablet Take 2.5 tablet by mouth once a day 225 tablet 0    sertraline (ZOLOFT) 100 MG tablet Take 2.5 tablet by mouth once a day (Patient not taking: Reported on 6/27/2024) 75 tablet 0    sertraline (ZOLOFT) 100 MG tablet Take 2.5 tablets (250 mg total) by mouth once daily. (Patient not taking: Reported on 6/27/2024) 13 tablet 0    sertraline (ZOLOFT) 100 MG tablet Take 2 tablets by mouth once a day. Take in addition to 50 mg tablet. (Patient not taking: Reported on 6/27/2024) 60 tablet 0    sertraline (ZOLOFT) 100 MG tablet Take 2 tablet by mouth once a day In addition to 50mg tablet 60 tablet 2    sertraline (ZOLOFT) 50 MG tablet Take 1 tablet by mouth once a day 30 tablet 2    traZODone (DESYREL) 100 MG tablet Take 1 tablet by mouth nightly as needed. (Patient not taking: Reported on 6/27/2024)      TRIKAFTA 100-50-75 mg(d) /150 mg (n) TbSQ TAKE 2 ORANGE TABLETS IN THE MORNING AND 1 BLUE TABLET IN THE EVENING APPROXIMATELY 12 HOURS APART. TAKE WITH FAT-CONTAINING FOOD 4 tablet 11    ZENPEP " 40,000-126,000- 168,000 unit CpDR TAKE 4 CAPSULES THREE TIMES A DAY WITH MEALS (Patient not taking: Reported on 6/27/2024) 1100 capsule 3       PMH:  As per HPI and below:  Past Medical History:   Diagnosis Date    Abnormal Pap smear of vagina     Bilateral carpal tunnel syndrome     Cystic fibrosis     Cystic fibrosis     Diabetes mellitus     CFRD    Psoriasis     Recurrent upper respiratory infection (URI)      Past Surgical History:   Procedure Laterality Date    ADENOIDECTOMY      COLONOSCOPY N/A 12/18/2018    Procedure: COLONOSCOPY;  Surgeon: Ernestina Onofre MD;  Location: [facility];  Service: Endoscopy;  Laterality: N/A;    ESOPHAGOGASTRODUODENOSCOPY N/A 12/18/2018    Procedure: ESOPHAGOGASTRODUODENOSCOPY (EGD);  Surgeon: Ernestina Onofre MD;  Location: [facility];  Service: Endoscopy;  Laterality: N/A;    SINUS SURGERY         Social History     Socioeconomic History    Marital status:    Occupational History    Occupation: Relator    Tobacco Use    Smoking status: Never    Smokeless tobacco: Never   Substance and Sexual Activity    Alcohol use: Not Currently     Comment: 1-2 drinks a week     Drug use: No    Sexual activity: Yes     Partners: Male     Birth control/protection: None     Comment:         Family History   Problem Relation Name Age of Onset    Heart disease Father      Diabetes Mother      Breast cancer Maternal Grandmother      Colon cancer Neg Hx      Ovarian cancer Neg Hx      Celiac disease Neg Hx      Crohn's disease Neg Hx      Esophageal cancer Neg Hx      Inflammatory bowel disease Neg Hx      Irritable bowel syndrome Neg Hx      Liver cancer Neg Hx      Rectal cancer Neg Hx      Stomach cancer Neg Hx      Ulcerative colitis Neg Hx         Physical Exam:      Vitals:    09/01/24 0539   BP: 133/81   Pulse: 77   Resp: 17   Temp: 97.8 °F (36.6 °C)     Gen: No acute distress.  Nontoxic.  Well appearing.  Mental Status:  Alert and oriented .  Appropriate, conversant.  Skin:  Warm, dry. No rashes seen.  Eyes:  Left eye with minimally injected conjunctiva.  Small stye noted to the inferior lid. No hyphema. No hypopion.  PERRL, EOMI.   Small area of fluorescein uptake at the inferior here medial margin of the iris, on the surface of the cornea  No foreign objects on eversion of eyelids.  IOP: L 11  Pulm: No increased work of breathing.  No significant tachypnea.  No audible stridor or wheezing.  No conversational dyspnea.    CV: Regular rate. Regular rhythm.   MSK: Good range of motion all joints.  No deformities.    Neuro: Awake. Speech normal. No focal neuro deficit observed.      Laboratory Studies:  Labs Reviewed - No data to display    Chart reviewed.     Imaging Results    None         Medications Given:  Medications   proparacaine 0.5 % ophthalmic solution 1 drop (1 drop Left Eye Given 9/1/24 0586)   fluorescein ophthalmic strip 1 each (1 each Left Eye Given 9/1/24 0512)     MDM:    49 y.o. female with complaint of atraumatic left eye pain.  Afebrile, hemodynamically stable.      There is a very small area of fluorescein uptake at the inferior here medial margin of the iris, on the surface of the cornea concerning for small corneal abrasion.  No large ulceration noted.  Normal intra-ocular pressures.      Will plan treatment with erythromycin eye ointment, and close follow up and return precautions.    Diagnostic Impression:    1. Left eye pain    2. Abrasion of right cornea, initial encounter         ED Disposition Condition    Discharge Stable          ED Prescriptions       Medication Sig Dispense Start Date End Date Auth. Provider    erythromycin (ROMYCIN) ophthalmic ointment Place a 1/2 inch ribbon of ointment into the lower eyelid four times daily 3.5 g 9/1/2024 -- Karen Hall MD          Follow-up Information       Follow up With Specialties Details Why Contact Info Additional Information    Dacia Miller NP Internal Medicine Schedule an appointment as soon as  possible for a visit   [address] [facility] [place]  [place] [id number]  [phone]       Gilberto frank - [address] [place] [address] Ophthalmology Schedule an appointment as soon as possible for a visit   [address]  [facility] [address] [phone] Please arrive on the 10th floor for check-in.            Patient understands the plan and is in agreement, verbalized understanding, questions answered    Karen Hall MD  Emergency Medicine         Karen Hall MD  09/02/24 0613

## 2024-09-01 NOTE — ED NOTES
Olinda Ileana Harjit, an 49 y.o. female presents to the ED     Chief Complaint   Patient presents with    Eye Pain     L eye pain. Pt had a stye 2-3 weeks seen at optho and sent home with tobramycin and doxycycline. Tonight visible scratch on L cornea. Vision intact in affected eye. Denies floaters or black spots.       Review of patient's allergies indicates:   Allergen Reactions    Milk containing products (dairy) Diarrhea    Penicillin     Soy Diarrhea    [place]      Past Medical History:   Diagnosis Date    Abnormal Pap smear of vagina     Bilateral carpal tunnel syndrome     Cystic fibrosis     Cystic fibrosis     Diabetes mellitus     CFRD    Psoriasis     Recurrent upper respiratory infection (URI)

## 2024-09-01 NOTE — DISCHARGE INSTRUCTIONS
Do not re-use the contact lens that you were wearing when the pain began. Do not use fresh contact lens until your eye has healed.    Referral has been placed for ophthalmology.     Tests today showed:   Labs Reviewed   HIV 1 / 2 ANTIBODY   HEPATITIS C ANTIBODY     No orders to display       Treatments you had today:   Medications   proparacaine 0.5 % ophthalmic solution 1 drop (1 drop Left Eye Given 9/1/24 2543)   fluorescein ophthalmic strip 1 each (1 each Left Eye Given 9/1/24 2371)       Follow-Up Plan:  - Follow-up with primary care doctor within 3 - 5 days  - Additional testing and/or evaluation as directed by your primary doctor    Return to the Emergency Department for symptoms including but not limited to: worsening symptoms, shortness of breath or chest pain, vomiting with inability to hold down fluids, fevers greater than 100.4°F, passing out/fainting/unconsciousness, or other concerning symptoms.

## 2024-09-02 ENCOUNTER — HOSPITAL ENCOUNTER (EMERGENCY)
Facility: HOSPITAL | Age: 49
Discharge: HOME | End: 2024-09-02
Attending: STUDENT IN AN ORGANIZED HEALTH CARE EDUCATION/TRAINING PROGRAM
Payer: COMMERCIAL

## 2024-09-02 ENCOUNTER — NURSE TRIAGE (OUTPATIENT)
Dept: ADMINISTRATIVE | Facility: CLINIC | Age: 49
End: 2024-09-02
Payer: COMMERCIAL

## 2024-09-02 VITALS
TEMPERATURE: 98 F | HEIGHT: 61 IN | RESPIRATION RATE: 16 BRPM | DIASTOLIC BLOOD PRESSURE: 69 MMHG | SYSTOLIC BLOOD PRESSURE: 122 MMHG | BODY MASS INDEX: 24.73 KG/M2 | OXYGEN SATURATION: 98 % | WEIGHT: 131 LBS | HEART RATE: 74 BPM

## 2024-09-02 DIAGNOSIS — H18.822 CORNEAL ABRASION OF LEFT EYE DUE TO CONTACT LENS: Primary | ICD-10-CM

## 2024-09-02 DIAGNOSIS — S05.02XD ABRASION OF LEFT CORNEA, SUBSEQUENT ENCOUNTER: ICD-10-CM

## 2024-09-02 PROCEDURE — 99283 EMERGENCY DEPT VISIT LOW MDM: CPT

## 2024-09-02 PROCEDURE — 25000003 PHARM REV CODE 250: Performed by: STUDENT IN AN ORGANIZED HEALTH CARE EDUCATION/TRAINING PROGRAM

## 2024-09-02 RX ORDER — PROPARACAINE HYDROCHLORIDE 5 MG/ML
1 SOLUTION/ DROPS OPHTHALMIC
Status: COMPLETED | OUTPATIENT
Start: 2024-09-02 | End: 2024-09-02

## 2024-09-02 RX ORDER — CIPROFLOXACIN HYDROCHLORIDE 3 MG/ML
1 SOLUTION/ DROPS OPHTHALMIC EVERY 4 HOURS
Qty: 5 ML | Refills: 0 | Status: SHIPPED | OUTPATIENT
Start: 2024-09-02 | End: 2024-09-19

## 2024-09-02 RX ADMIN — FLUORESCEIN SODIUM 1 EACH: 1 STRIP OPHTHALMIC at 12:09

## 2024-09-02 RX ADMIN — PROPARACAINE HYDROCHLORIDE 1 DROP: 5 SOLUTION/ DROPS OPHTHALMIC at 12:09

## 2024-09-02 NOTE — ED PROVIDER NOTES
"Encounter Date: 9/2/2024       History     Chief Complaint   Patient presents with    Eye Pain     Seen recently for corneal abrasion     49-year-old female with PMH of diabetes and CF presents with left eye pain.  Patient states she was seen 2 days ago in the ED for corneal abrasion and was given antibiotics.  She states that overall her pain has been improving from a 7 to a 4 but that she expected it to get better and does not think she should still be in pain.  She denies any known trauma to the eye.  States that she wears her contacts "for like 20 years" at a time and just " dump saline" in her eye every night rather than taking her contacts out.  She does not have the right prescription of the glasses currently so she was unsure if she has had vision changes.  Denies headache, dizziness, lightheadedness, vision loss.  ROS otherwise negative.    The history is provided by the patient and medical records.     Review of patient's allergies indicates:   Allergen Reactions    Milk containing products (dairy) Diarrhea    Penicillin     Soy Diarrhea    Magnolia      Past Medical History:   Diagnosis Date    Abnormal Pap smear of vagina     Bilateral carpal tunnel syndrome     Cystic fibrosis     Cystic fibrosis     Diabetes mellitus     CFRD    Psoriasis     Recurrent upper respiratory infection (URI)      Past Surgical History:   Procedure Laterality Date    ADENOIDECTOMY      COLONOSCOPY N/A 12/18/2018    Procedure: COLONOSCOPY;  Surgeon: Ernestina Onofre MD;  Location: [facility];  Service: Endoscopy;  Laterality: N/A;    ESOPHAGOGASTRODUODENOSCOPY N/A 12/18/2018    Procedure: ESOPHAGOGASTRODUODENOSCOPY (EGD);  Surgeon: Ernestina Onofre MD;  Location: [facility];  Service: Endoscopy;  Laterality: N/A;    SINUS SURGERY       Family History   Problem Relation Name Age of Onset    Heart disease Father      Diabetes Mother      Breast cancer Maternal Grandmother      Colon cancer Neg Hx      Ovarian cancer Neg Hx      " Celiac disease Neg Hx      Crohn's disease Neg Hx      Esophageal cancer Neg Hx      Inflammatory bowel disease Neg Hx      Irritable bowel syndrome Neg Hx      Liver cancer Neg Hx      Rectal cancer Neg Hx      Stomach cancer Neg Hx      Ulcerative colitis Neg Hx       Social History     Tobacco Use    Smoking status: Never    Smokeless tobacco: Never   Substance Use Topics    Alcohol use: Not Currently     Comment: 1-2 drinks a week     Drug use: No     Review of Systems    Physical Exam     Initial Vitals [09/02/24 1201]   BP Pulse Resp Temp SpO2   129/64 76 18 98.7 °F (37.1 °C) 98 %      MAP       --         Physical Exam    Nursing note and vitals reviewed.  Constitutional: She appears well-developed and well-nourished. She is not diaphoretic. No distress.   HENT:   Head: Normocephalic and atraumatic.   Eyes:   Fluorescin-stained exam: uptake in inferolateral quadrant, consistent with corneal abrasion   Neck: Neck supple.   Normal range of motion.  Musculoskeletal:      Cervical back: Normal range of motion and neck supple.     Neurological: She is alert and oriented to person, place, and time.   Skin: Skin is warm and dry. Capillary refill takes less than 2 seconds.         ED Course   Procedures  Labs Reviewed - No data to display       Imaging Results    None          Medications   fluorescein ophthalmic strip 1 each (1 each Left Eye Given 9/2/24 1230)   proparacaine 0.5 % ophthalmic solution 1 drop (1 drop Left Eye Given 9/2/24 1230)     Medical Decision Making  Differential diagnoses considered includes corneal abrasion, corneal ulcer, conjunctivitis, Pseudomonas infection    Given patient's contact lens use and questionable improvement since starting abx, switched abx to Cipro drops.  Patient patient instructed to call Ophthalmology in the morning to schedule an appointment within the next 2 days. Patient will be discharged at this time. Patient has been given home care instructions, follow up  instructions, and strict return precautions. They agree with and are comfortable with the plan.     Risk  Prescription drug management.                                      Clinical Impression:  Final diagnoses:  [S05.02XD] Abrasion of left cornea, subsequent encounter  [H18.822] Corneal abrasion of left eye due to contact lens (Primary)          ED Disposition Condition    Discharge Stable          ED Prescriptions       Medication Sig Dispense Start Date End Date Auth. Provider    ciprofloxacin HCl (CILOXAN) 0.3 % ophthalmic solution Place 1 drop into the left eye every 4 (four) hours. for 5 days 5 mL 9/2/2024 9/19/2024 Saul García MD          Follow-up Information       Follow up With Specialties Details Why Contact Info    Dacia Miller NP Internal Medicine Schedule an appointment as soon as possible for a visit  To discuss your recent ER visit and any additional concerns that you may have [address] [facility]  [place] [address]  [phone]      [facility] - Emergency Dept Emergency Medicine Go to  As needed, If symptoms worsen [address] [facility] [address] [phone]             Saul García MD  09/02/24 1243

## 2024-09-02 NOTE — TELEPHONE ENCOUNTER
"Patient states she went to the ER for a cornea abration and she is still in a lot pain. Left eye swelling, drainage, and pain. Pain level 5/10. Care advice  discussed. Understanding verbalized. She states she will think about if she will go back because she is using the medication and it still feels bad. Please contact caller directly to discuss any further care advice.    Reason for Disposition   [1] Foreign body sensation ("feels like something is in there") AND [2] no improvement after irrigation (regardless of duration of flushing)    Additional Information   Negative: SEVERE eye pain   Negative: Complete loss of vision in one or both eyes   Negative: [1] Eyelids are very swollen (shut or almost) AND [2] fever   Negative: [1] Eyelid (outer) is very red AND [2] fever    Protocols used: Eye Pain and Other Symptoms-A-AH    "

## 2024-09-02 NOTE — DISCHARGE INSTRUCTIONS
Change your antibiotic to Ciprofloxacin drops, which I sent to [facility].      Call Ophthalmology in the morning to schedule an appointment within the next two days for an evaluation.

## 2024-09-03 ENCOUNTER — TELEPHONE (OUTPATIENT)
Dept: OPTOMETRY | Facility: CLINIC | Age: 49
End: 2024-09-03

## 2024-09-03 ENCOUNTER — TELEPHONE (OUTPATIENT)
Dept: OPHTHALMOLOGY | Facility: CLINIC | Age: 49
End: 2024-09-03
Payer: COMMERCIAL

## 2024-09-03 ENCOUNTER — TELEPHONE (OUTPATIENT)
Dept: INTERNAL MEDICINE | Facility: CLINIC | Age: 49
End: 2024-09-03

## 2024-09-03 ENCOUNTER — OFFICE VISIT (OUTPATIENT)
Dept: OPTOMETRY | Facility: CLINIC | Age: 49
End: 2024-09-03
Payer: COMMERCIAL

## 2024-09-03 DIAGNOSIS — H16.002 CORNEAL ULCER OF LEFT EYE: Primary | ICD-10-CM

## 2024-09-03 PROCEDURE — 99203 OFFICE O/P NEW LOW 30 MIN: CPT | Mod: S$GLB,,, | Performed by: OPTOMETRIST

## 2024-09-03 PROCEDURE — 99999 PR PBB SHADOW E&M-EST. PATIENT-LVL IV: CPT | Mod: PBBFAC,,, | Performed by: OPTOMETRIST

## 2024-09-03 PROCEDURE — 1159F MED LIST DOCD IN RCRD: CPT | Mod: CPTII,S$GLB,, | Performed by: OPTOMETRIST

## 2024-09-03 PROCEDURE — 1160F RVW MEDS BY RX/DR IN RCRD: CPT | Mod: CPTII,S$GLB,, | Performed by: OPTOMETRIST

## 2024-09-03 NOTE — TELEPHONE ENCOUNTER
----- Message from Mariaelena Nevarez sent at 9/3/2024 10:57 AM CDT -----  Regarding: Consult/Advisory  Contact: Olinda  Consult/Advisory     Name Of Caller:Olinda Zunigagh          Contact Preference:[phone] (home)        Nature of call:Pt is calling to see she can be seen today, went to ER twice for Corneal abrasion. Requesting a call back.

## 2024-09-03 NOTE — PROGRESS NOTES
HPI    FÉLIX: 09/01/2024 and 09/02/2024 ED  Last DFE: Last year   Chief complaint (CC): Possible corneal abrasion, left eye.  Left eye   became irritated a few days ago- was seen at ER. Initially prescribed   Erythromycin, with no improvement. Went back to ER and was prescribed   Cipro.     Glasses? No  Contacts? Yes, wear CTL full time and sleep in CTL   H/o eye surgery, injections or laser: No   H/o eye injury: Corneal abrasion, left eye  Known eye conditions? No   Family h/o eye conditions? Mother Glaucoma, Aunt AMD and Father Cataracts  Eye gtts?    AT's preservative free   Ciprofloxacin Q4hrs OS       (-) Flashes (-)  Floaters (+) Mucous   (-)  Tearing (-) Itching (+) Burning   (-) Headaches (+) Eye Pain/discomfort (+) Irritation   (-)  Redness (-) Double vision (+) Blurry vision, left eye     CL Exam: No  Current CL Brand: Acvvue Oasys  Rx OD  -2.25   OS  -2.25              Wears full-time or part-time:  Full time     Sleeps with contact lenses:  Yes    CL Solution used:     How often replace CLs:   q6months   Any problem with VA with CLs?  No    Diabetic? Yes  A1c? Lab Results       Component                Value               Date                       HGBA1C                   6.4 (H)             12/03/2019             Last edited by James Spring, OD on 9/3/2024  3:33 PM.            Assessment /Plan     For exam results, see Encounter Report.        Corneal ulcer of left eye  - Pt educated on importance of proper CL hygiene and risk of permanent vision loss with continued poor CL hygiene  - Continue Ciprofloxacin [id number] OS and increase PF AT's to Q1-2hours OS  - RTC Friday for follow up

## 2024-09-06 ENCOUNTER — OFFICE VISIT (OUTPATIENT)
Dept: OPTOMETRY | Facility: CLINIC | Age: 49
End: 2024-09-06
Payer: COMMERCIAL

## 2024-09-06 DIAGNOSIS — H16.002 CORNEAL ULCER OF LEFT EYE: Primary | ICD-10-CM

## 2024-09-06 PROCEDURE — 99999 PR PBB SHADOW E&M-EST. PATIENT-LVL III: CPT | Mod: PBBFAC,,, | Performed by: OPTOMETRIST

## 2024-09-06 RX ORDER — LOTEPREDNOL ETABONATE 5 MG/ML
1 SUSPENSION/ DROPS OPHTHALMIC 4 TIMES DAILY
Qty: 5 ML | Refills: 0 | Status: SHIPPED | OUTPATIENT
Start: 2024-09-06 | End: 2025-09-06

## 2024-09-06 NOTE — PROGRESS NOTES
HPI    FÉLIX: 09/03/24  Last DFE: Last year   Chief complaint (CC): 49 y.o. female is here for 3 day F/U for corneal   ulcer, left eye. Vision has improved, left eye. No discomfort.      Glasses? No  Contacts? Yes, wear CTL full time and sleep in CTL   H/o eye surgery, injections or laser: No   H/o eye injury: Corneal abrasion, left eye  Known eye conditions? No   Family h/o eye conditions? Mother Glaucoma, Aunt AMD and Father Cataracts  Eye gtts?               AT's preservative free              Ciprofloxacin Q4hrs OS      (-) Flashes (-)  Floaters (+) Mucous   (-)  Tearing (-) Itching (+) Burning   (-) Headaches (+) Eye Pain/discomfort (+) Irritation   (-)  Redness (-) Double vision (+) Blurry vision, left eye      CL Exam: No  Current CL Brand: Acvvue Oasys  Rx        OD  -2.25              OS  -2.25              Wears full-time or part-time:  Full time                Sleeps with contact lenses:  Yes               CL Solution used:                How often replace CLs:   q6months              Any problem with VA with CLs?  No     Diabetic? Yes  A1c? Lab Results       Component                Value               Date                       HGBA1C                   6.4 (H)             12/03/2019            Last edited by James Spring, OD on 9/6/2024  3:20 PM.            Assessment /Plan     For exam results, see Encounter Report.        Corneal ulcer of left eye  - Improving. Smaller infiltrate remains.   - Pt educated on importance of proper CL hygiene and risk of permanent vision loss with continued poor CL hygiene  - Decrease Ciprofloxacin to QID and PF AT's to [id number] or QID OS  - Initiate Lotemax QID OS. Advised to wait 5 mins between drops.   - RTC 1 week for follow up

## 2024-09-18 ENCOUNTER — PATIENT MESSAGE (OUTPATIENT)
Dept: OPTOMETRY | Facility: CLINIC | Age: 49
End: 2024-09-18
Payer: COMMERCIAL

## 2024-10-09 ENCOUNTER — PATIENT MESSAGE (OUTPATIENT)
Dept: OPTOMETRY | Facility: CLINIC | Age: 49
End: 2024-10-09

## 2024-10-09 ENCOUNTER — OFFICE VISIT (OUTPATIENT)
Dept: OPTOMETRY | Facility: CLINIC | Age: 49
End: 2024-10-09
Payer: COMMERCIAL

## 2024-10-09 DIAGNOSIS — E11.9 DIABETES MELLITUS TYPE 2 WITHOUT RETINOPATHY: Primary | ICD-10-CM

## 2024-10-09 DIAGNOSIS — H16.002 CORNEAL ULCER OF LEFT EYE: ICD-10-CM

## 2024-10-09 DIAGNOSIS — H52.4 MYOPIA OF BOTH EYES WITH ASTIGMATISM AND PRESBYOPIA: ICD-10-CM

## 2024-10-09 DIAGNOSIS — H52.13 MYOPIA OF BOTH EYES WITH ASTIGMATISM AND PRESBYOPIA: ICD-10-CM

## 2024-10-09 DIAGNOSIS — Z46.0 FITTING AND ADJUSTMENT OF SPECTACLES AND CONTACT LENSES: Primary | ICD-10-CM

## 2024-10-09 DIAGNOSIS — H52.203 MYOPIA OF BOTH EYES WITH ASTIGMATISM AND PRESBYOPIA: ICD-10-CM

## 2024-10-09 PROCEDURE — 99999 PR PBB SHADOW E&M-EST. PATIENT-LVL III: CPT | Mod: PBBFAC,,, | Performed by: OPTOMETRIST

## 2024-10-09 PROCEDURE — 99999 PR PBB SHADOW E&M-EST. PATIENT-LVL I: CPT | Mod: PBBFAC,,, | Performed by: OPTOMETRIST

## 2024-10-09 PROCEDURE — 1159F MED LIST DOCD IN RCRD: CPT | Mod: CPTII,S$GLB,, | Performed by: OPTOMETRIST

## 2024-10-09 PROCEDURE — 2023F DILAT RTA XM W/O RTNOPTHY: CPT | Mod: CPTII,S$GLB,, | Performed by: OPTOMETRIST

## 2024-10-09 PROCEDURE — 1160F RVW MEDS BY RX/DR IN RCRD: CPT | Mod: CPTII,S$GLB,, | Performed by: OPTOMETRIST

## 2024-10-09 PROCEDURE — 92310 CONTACT LENS FITTING OU: CPT | Mod: CSM,,, | Performed by: OPTOMETRIST

## 2024-10-09 PROCEDURE — 92014 COMPRE OPH EXAM EST PT 1/>: CPT | Mod: S$GLB,,, | Performed by: OPTOMETRIST

## 2024-10-09 NOTE — PROGRESS NOTES
HPI    FÉLIX: 9/6/2024  Last DFE: 2023  Chief complaint (CC): 50 yo F here for annual eye exam. Pt denies any   ocular or visual complaints today. Pt requests updated CL prescription.   Glasses? Yes  Contacts? Yes  H/o eye surgery, injections or laser: No  H/o eye injury: No  Known eye conditions?    Corneal ulcer of left eye   Family h/o eye conditions? Glaucoma and cataracts  Eye gtts? At ou prn    (-) Flashes (-)  Floaters (-) Mucous   (-)  Tearing (-) Itching (-) Burning   (-) Headaches (-) Eye Pain/discomfort (-) Irritation   (-)  Redness (-) Double vision (+) Blurry vision    CL Exam: Yes    Diabetic? Yes  A1c? Lab Results       Component                Value               Date                       HGBA1C                   6.4 (H)             12/03/2019              Last edited by James Spring, OD on 10/9/2024  4:08 PM.            Assessment /Plan     For exam results, see Encounter Report.      Diabetes mellitus type 2 without retinopathy   - Pt educated on the importance of maintaining adequate glycemic and blood pressure control via diet, compliance with medications, exercise and timely follow up with PCP.  No diabetic retinopathy, No CSME.     Corneal ulcer of left eye   - Resolved.    - Proper CL hygiene discussed.     Myopia of both eyes with astigmatism and presbyopia   - New Spectacle Rx given today   - CL trials ordered for Multifocals- first time wearer  - RTC for fitting when lenses arrive

## 2024-10-21 ENCOUNTER — PATIENT MESSAGE (OUTPATIENT)
Dept: OPTOMETRY | Facility: CLINIC | Age: 49
End: 2024-10-21
Payer: COMMERCIAL

## 2024-10-22 ENCOUNTER — PATIENT MESSAGE (OUTPATIENT)
Dept: RESEARCH | Facility: HOSPITAL | Age: 49
End: 2024-10-22
Payer: COMMERCIAL

## 2024-11-04 ENCOUNTER — OFFICE VISIT (OUTPATIENT)
Dept: OPTOMETRY | Facility: CLINIC | Age: 49
End: 2024-11-04
Payer: COMMERCIAL

## 2024-11-04 DIAGNOSIS — H52.203 MYOPIA OF BOTH EYES WITH ASTIGMATISM AND PRESBYOPIA: Primary | ICD-10-CM

## 2024-11-04 DIAGNOSIS — H52.4 MYOPIA OF BOTH EYES WITH ASTIGMATISM AND PRESBYOPIA: Primary | ICD-10-CM

## 2024-11-04 DIAGNOSIS — H52.13 MYOPIA OF BOTH EYES WITH ASTIGMATISM AND PRESBYOPIA: Primary | ICD-10-CM

## 2024-11-04 PROCEDURE — 99499 UNLISTED E&M SERVICE: CPT | Mod: S$GLB,,, | Performed by: OPTOMETRIST

## 2024-11-04 NOTE — PROGRESS NOTES
HPI    FÉLIX: 10/9/2024  Last DFE: 10/9/2024  Chief complaint (CC): 48 yo F here for CL f/u.   Contacts? Yes  H/o eye surgery, injections or laser: No  H/o eye injury: No  Known eye conditions?    Diabetes mellitus type 2 without retinopathy   H/o Corneal ulcer of left eye   Myopia of both eyes with astigmatism and presbyopia  Family h/o eye conditions? Glaucoma and cataracts  Eye gtts? At ou prn    (-) Flashes (-)  Floaters (-) Mucous   (-)  Tearing (-) Itching (-) Burning   (-) Headaches (-) Eye Pain/discomfort (-) Irritation   (-)  Redness (-) Double vision (+) Blurry vision    CL Exam: Yes    Diabetic? Yes  A1c? Lab Results       Component                Value               Date                       HGBA1C                   6.4 (H)             12/03/2019              Last edited by James Spring, OD on 11/4/2024  1:00 PM.            Assessment /Plan     For exam results, see Encounter Report.      Myopia of both eyes with astigmatism and presbyopia   - New CL Rx given, pt happy with vision in multifocals. RTC 1 year for routine eye exam.

## 2024-11-09 ENCOUNTER — OFFICE VISIT (OUTPATIENT)
Dept: URGENT CARE | Facility: CLINIC | Age: 49
End: 2024-11-09
Payer: COMMERCIAL

## 2024-11-09 VITALS
TEMPERATURE: 98 F | BODY MASS INDEX: 24.72 KG/M2 | HEIGHT: 61 IN | RESPIRATION RATE: 19 BRPM | SYSTOLIC BLOOD PRESSURE: 133 MMHG | WEIGHT: 130.94 LBS | OXYGEN SATURATION: 97 % | DIASTOLIC BLOOD PRESSURE: 76 MMHG | HEART RATE: 88 BPM

## 2024-11-09 DIAGNOSIS — R21 RASH AND NONSPECIFIC SKIN ERUPTION: Primary | ICD-10-CM

## 2024-11-09 LAB
CTP QC/QA: YES
MOLECULAR STREP A: NEGATIVE

## 2024-11-09 PROCEDURE — 96372 THER/PROPH/DIAG INJ SC/IM: CPT | Mod: S$GLB,,, | Performed by: NURSE PRACTITIONER

## 2024-11-09 PROCEDURE — 87651 STREP A DNA AMP PROBE: CPT | Mod: QW,S$GLB,, | Performed by: NURSE PRACTITIONER

## 2024-11-09 PROCEDURE — 99213 OFFICE O/P EST LOW 20 MIN: CPT | Mod: 25,S$GLB,, | Performed by: NURSE PRACTITIONER

## 2024-11-09 RX ORDER — TRIAMCINOLONE ACETONIDE 1 MG/G
OINTMENT TOPICAL 2 TIMES DAILY
Qty: 30 G | Refills: 0 | Status: SHIPPED | OUTPATIENT
Start: 2024-11-09

## 2024-11-09 RX ORDER — DEXAMETHASONE SODIUM PHOSPHATE 10 MG/ML
10 INJECTION INTRAMUSCULAR; INTRAVENOUS ONCE
Status: COMPLETED | OUTPATIENT
Start: 2024-11-09 | End: 2024-11-09

## 2024-11-09 RX ADMIN — DEXAMETHASONE SODIUM PHOSPHATE 10 MG: 10 INJECTION INTRAMUSCULAR; INTRAVENOUS at 03:11

## 2024-11-09 NOTE — PROGRESS NOTES
"Subjective:      Patient ID: Olinda Lombardi is a 49 y.o. female.    Vitals:  height is 5' 1" (1.549 m) and weight is 59.4 kg (130 lb 15.3 oz). Her oral temperature is 98.2 °F (36.8 °C). Her blood pressure is 133/76 and her pulse is 88. Her respiration is 19 and oxygen saturation is 97%.     Chief Complaint: Rash (To skin of buttocks and chest area. )     49 y.o. female presents today with a complaint of fatigue and a rash on her bilateral lower legs.  Reports onset of fatigue, 3 days ago, onset of rash, 1 week ago.  Denies new medication, new soaps or detergents or new food.  Denies fever, chills, chest pain or shortness a breath.  Chronic history of cystic fibrosis.    Rash  This is a new problem. The current episode started in the past 7 days. The affected locations include the left buttock, right buttock and chest. The rash is characterized by redness. She was exposed to nothing. Associated symptoms include fatigue. Pertinent negatives include no anorexia, congestion, cough, diarrhea, eye pain, facial edema, fever, joint pain, nail changes, rhinorrhea, shortness of breath, sore throat or vomiting. Past treatments include nothing. There is no history of allergies, asthma, eczema or varicella.       Constitution: Positive for fatigue. Negative for chills, fever and generalized weakness.   HENT:  Negative for congestion and sore throat.    Cardiovascular:  Negative for chest pain and palpitations.   Eyes:  Negative for eye pain.   Respiratory:  Negative for cough and shortness of breath.    Gastrointestinal:  Negative for nausea, vomiting and diarrhea.   Musculoskeletal:  Negative for pain, joint pain and back pain.   Skin:  Positive for rash (Bilateral lower legs, left chest wall) and erythema.   Neurological:  Negative for dizziness.      Objective:     Physical Exam   Constitutional: She is oriented to person, place, and time. She appears well-developed.  Non-toxic appearance. She does not appear ill. No " distress.   HENT:   Head: Normocephalic and atraumatic. Head is without abrasion, without contusion and without laceration.   Ears:   Right Ear: External ear normal.   Left Ear: External ear normal.   Nose: Nose normal.   Mouth/Throat: Oropharynx is clear and moist and mucous membranes are normal.   Eyes: Conjunctivae, EOM and lids are normal. Pupils are equal, round, and reactive to light.   Neck: Trachea normal and phonation normal. Neck supple.   Cardiovascular: Normal rate, regular rhythm and normal heart sounds.   Pulmonary/Chest: Effort normal and breath sounds normal. No stridor. No respiratory distress.   Musculoskeletal: Normal range of motion.         General: Normal range of motion.   Neurological: She is alert and oriented to person, place, and time.   Skin: Skin is warm, dry, intact, not diaphoretic, not pale, rash, not urticarial, not nodular, not pustular, not purpuric, not macular, not vesicular, not papular, maculopapular and no abscessed. Capillary refill takes less than 2 seconds. erythema No abrasion, No burn, No bruising, No ecchymosis and No lesion      jaundice  Psychiatric: Her speech is normal and behavior is normal. Judgment and thought content normal.   Nursing note and vitals reviewed.      Assessment:     1. Rash and nonspecific skin eruption      Results for orders placed or performed in visit on 11/09/24   POCT Strep A, Molecular    Collection Time: 11/09/24  3:41 PM   Result Value Ref Range    Molecular Strep A, POC Negative Negative     Acceptable Yes       Plan:     Rash and nonspecific skin eruption  -     dexAMETHasone injection 10 mg  -     triamcinolone acetonide 0.1% (KENALOG) 0.1 % ointment; Apply topically 2 (two) times daily.  Dispense: 30 g; Refill: 0  -     POCT Strep A, Molecular    Rashes  If your condition worsens or fails to improve we recommend that you receive another evaluation at the ER immediately or contact your PCP to discuss your concerns or  return here. You must understand that you've received an urgent care treatment only and that you may be released before all your medical problems are known or treated. You the patient will arrange for follouwp care as instructed.   -  Avoid picking or manipulating the affected areas. Clean the areas twice a day with water and soap.  Apply the topical cream as prescribed.     -  You should seek ER treatment if fever, increase pain, swelling, red streaks from affected area or other signs of increasing infection.     -  Tylenol or ibuprofen can also be used as directed for pain unless you have an allergy to them or medical condition such as stomach ulcers, kidney or liver disease or blood thinners etc for which you should not be taking these type of medications.   If not allergic, please take over the counter Tylenol (Acetaminophen) and/or Motrin (Ibuprofen) as directed for control of pain and/or fever.

## 2024-11-09 NOTE — PATIENT INSTRUCTIONS
You must understand that you've received an Urgent Care treatment only and that you may be released before all your medical problems are known or treated. You, the patient, will arrange for follow up care as instructed.  Follow up with your PCP or specialty clinic as directed in the next 1-2 weeks if not improved or as needed.  You can call [phone] to schedule an appointment with the appropriate provider.  If your condition worsens we recommend that you receive another evaluation at the emergency room immediately or contact your primary medical clinics after hours call service to discuss your concerns.  Please return here or go to the Emergency Department for any concerns or worsening of condition.    If you were prescribed a narcotic or controlled medication, do not drive or operate heavy equipment or machinery while taking these medications.    Thank you for choosing [facility]!    Our goal in the Urgent Care is to always provide outstanding medical care. You may receive a survey by mail or e-mail in the next week regarding your experience today. We would greatly appreciate you completing and returning the survey. Your feedback provides us with a way to recognize our staff who provide very good care, and it helps us learn how to improve when your experience was below our aspiration of excellence.      We appreciate you trusting us with your medical care. We hope you feel better soon. We will be happy to take care of you for all of your future medical needs.   This note was prepared using voice-recognition software.  Although efforts are made to proofread the note, some errors may persist in the final document.     Sincerely,    Ulisses Che DNP, FNP-C

## 2024-11-11 ENCOUNTER — HOSPITAL ENCOUNTER (EMERGENCY)
Facility: HOSPITAL | Age: 49
Discharge: HOME | End: 2024-11-11
Attending: EMERGENCY MEDICINE
Payer: COMMERCIAL

## 2024-11-11 ENCOUNTER — OFFICE VISIT (OUTPATIENT)
Dept: INTERNAL MEDICINE | Facility: CLINIC | Age: 49
End: 2024-11-11
Payer: COMMERCIAL

## 2024-11-11 VITALS
HEIGHT: 61 IN | WEIGHT: 130.5 LBS | RESPIRATION RATE: 18 BRPM | DIASTOLIC BLOOD PRESSURE: 60 MMHG | HEART RATE: 98 BPM | OXYGEN SATURATION: 95 % | TEMPERATURE: 98 F | SYSTOLIC BLOOD PRESSURE: 120 MMHG | BODY MASS INDEX: 24.64 KG/M2

## 2024-11-11 VITALS
BODY MASS INDEX: 24.55 KG/M2 | WEIGHT: 130 LBS | DIASTOLIC BLOOD PRESSURE: 78 MMHG | HEIGHT: 61 IN | HEART RATE: 76 BPM | RESPIRATION RATE: 14 BRPM | SYSTOLIC BLOOD PRESSURE: 124 MMHG | TEMPERATURE: 98 F | OXYGEN SATURATION: 99 %

## 2024-11-11 DIAGNOSIS — E84.9 CYSTIC FIBROSIS: ICD-10-CM

## 2024-11-11 DIAGNOSIS — N39.0 URINARY TRACT INFECTION WITHOUT HEMATURIA, SITE UNSPECIFIED: ICD-10-CM

## 2024-11-11 DIAGNOSIS — E84.9 CF (CYSTIC FIBROSIS): ICD-10-CM

## 2024-11-11 DIAGNOSIS — J06.9 UPPER RESPIRATORY TRACT INFECTION, UNSPECIFIED TYPE: Primary | ICD-10-CM

## 2024-11-11 DIAGNOSIS — R21 RASH: Primary | ICD-10-CM

## 2024-11-11 LAB
ALBUMIN SERPL BCP-MCNC: 3 G/DL (ref 3.5–5.2)
ALP SERPL-CCNC: 97 U/L (ref 40–150)
ALT SERPL W/O P-5'-P-CCNC: 15 U/L (ref 10–44)
ANION GAP SERPL CALC-SCNC: 5 MMOL/L (ref 8–16)
AST SERPL-CCNC: 15 U/L (ref 10–40)
B-HCG UR QL: NEGATIVE
BACTERIA #/AREA URNS AUTO: ABNORMAL /HPF
BASOPHILS # BLD AUTO: 0.03 K/UL (ref 0–0.2)
BASOPHILS NFR BLD: 0.7 % (ref 0–1.9)
BILIRUB SERPL-MCNC: 0.3 MG/DL (ref 0.1–1)
BILIRUB UR QL STRIP: NEGATIVE
BUN SERPL-MCNC: 8 MG/DL (ref 6–20)
CALCIUM SERPL-MCNC: 8.7 MG/DL (ref 8.7–10.5)
CHLORIDE SERPL-SCNC: 106 MMOL/L (ref 95–110)
CLARITY UR REFRACT.AUTO: ABNORMAL
CO2 SERPL-SCNC: 27 MMOL/L (ref 23–29)
COLOR UR AUTO: YELLOW
CREAT SERPL-MCNC: 0.7 MG/DL (ref 0.5–1.4)
CTP QC/QA: YES
DIFFERENTIAL METHOD BLD: ABNORMAL
EOSINOPHIL # BLD AUTO: 0.1 K/UL (ref 0–0.5)
EOSINOPHIL NFR BLD: 1.8 % (ref 0–8)
ERYTHROCYTE [DISTWIDTH] IN BLOOD BY AUTOMATED COUNT: 12.7 % (ref 11.5–14.5)
EST. GFR  (NO RACE VARIABLE): >60 ML/MIN/1.73 M^2
GLUCOSE SERPL-MCNC: 176 MG/DL (ref 70–110)
GLUCOSE UR QL STRIP: ABNORMAL
GROUP A STREP, MOLECULAR: NEGATIVE
HCT VFR BLD AUTO: 32.1 % (ref 37–48.5)
HCV AB SERPL QL IA: NORMAL
HGB BLD-MCNC: 10.9 G/DL (ref 12–16)
HGB UR QL STRIP: ABNORMAL
HIV 1+2 AB+HIV1 P24 AG SERPL QL IA: NORMAL
IMM GRANULOCYTES # BLD AUTO: 0.02 K/UL (ref 0–0.04)
IMM GRANULOCYTES NFR BLD AUTO: 0.5 % (ref 0–0.5)
KETONES UR QL STRIP: NEGATIVE
LEUKOCYTE ESTERASE UR QL STRIP: NEGATIVE
LYMPHOCYTES # BLD AUTO: 1 K/UL (ref 1–4.8)
LYMPHOCYTES NFR BLD: 21.6 % (ref 18–48)
MCH RBC QN AUTO: 30.5 PG (ref 27–31)
MCHC RBC AUTO-ENTMCNC: 34 G/DL (ref 32–36)
MCV RBC AUTO: 90 FL (ref 82–98)
MICROSCOPIC COMMENT: ABNORMAL
MONOCYTES # BLD AUTO: 0.4 K/UL (ref 0.3–1)
MONOCYTES NFR BLD: 8.1 % (ref 4–15)
NEUTROPHILS # BLD AUTO: 3 K/UL (ref 1.8–7.7)
NEUTROPHILS NFR BLD: 67.3 % (ref 38–73)
NITRITE UR QL STRIP: NEGATIVE
NRBC BLD-RTO: 0 /100 WBC
PH UR STRIP: 7 [PH] (ref 5–8)
PLATELET # BLD AUTO: 257 K/UL (ref 150–450)
PMV BLD AUTO: 8.9 FL (ref 9.2–12.9)
POTASSIUM SERPL-SCNC: 3.8 MMOL/L (ref 3.5–5.1)
PROT SERPL-MCNC: 6.1 G/DL (ref 6–8.4)
PROT UR QL STRIP: NEGATIVE
RBC # BLD AUTO: 3.57 M/UL (ref 4–5.4)
RBC #/AREA URNS AUTO: 5 /HPF (ref 0–4)
SODIUM SERPL-SCNC: 138 MMOL/L (ref 136–145)
SP GR UR STRIP: 1.01 (ref 1–1.03)
SQUAMOUS #/AREA URNS AUTO: 50 /HPF
URN SPEC COLLECT METH UR: ABNORMAL
WBC # BLD AUTO: 4.44 K/UL (ref 3.9–12.7)
WBC #/AREA URNS AUTO: 11 /HPF (ref 0–5)
YEAST UR QL AUTO: ABNORMAL

## 2024-11-11 PROCEDURE — 81001 URINALYSIS AUTO W/SCOPE: CPT | Performed by: EMERGENCY MEDICINE

## 2024-11-11 PROCEDURE — 87389 HIV-1 AG W/HIV-1&-2 AB AG IA: CPT | Performed by: PHYSICIAN ASSISTANT

## 2024-11-11 PROCEDURE — 99214 OFFICE O/P EST MOD 30 MIN: CPT | Mod: S$GLB,,,

## 2024-11-11 PROCEDURE — 99999 PR PBB SHADOW E&M-EST. PATIENT-LVL V: CPT | Mod: PBBFAC,,,

## 2024-11-11 PROCEDURE — 3074F SYST BP LT 130 MM HG: CPT | Mod: CPTII,S$GLB,,

## 2024-11-11 PROCEDURE — 1159F MED LIST DOCD IN RCRD: CPT | Mod: CPTII,S$GLB,,

## 2024-11-11 PROCEDURE — 81025 URINE PREGNANCY TEST: CPT | Performed by: EMERGENCY MEDICINE

## 2024-11-11 PROCEDURE — 3008F BODY MASS INDEX DOCD: CPT | Mod: CPTII,S$GLB,,

## 2024-11-11 PROCEDURE — 86803 HEPATITIS C AB TEST: CPT | Performed by: PHYSICIAN ASSISTANT

## 2024-11-11 PROCEDURE — 3078F DIAST BP <80 MM HG: CPT | Mod: CPTII,S$GLB,,

## 2024-11-11 PROCEDURE — 80053 COMPREHEN METABOLIC PANEL: CPT | Performed by: EMERGENCY MEDICINE

## 2024-11-11 PROCEDURE — 87651 STREP A DNA AMP PROBE: CPT | Performed by: EMERGENCY MEDICINE

## 2024-11-11 PROCEDURE — 99283 EMERGENCY DEPT VISIT LOW MDM: CPT

## 2024-11-11 PROCEDURE — 85025 COMPLETE CBC W/AUTO DIFF WBC: CPT | Performed by: EMERGENCY MEDICINE

## 2024-11-11 PROCEDURE — 87086 URINE CULTURE/COLONY COUNT: CPT | Performed by: EMERGENCY MEDICINE

## 2024-11-11 RX ORDER — NITROFURANTOIN 25; 75 MG/1; MG/1
100 CAPSULE ORAL 2 TIMES DAILY
Qty: 14 CAPSULE | Refills: 0 | Status: SHIPPED | OUTPATIENT
Start: 2024-11-11 | End: 2024-11-18

## 2024-11-11 NOTE — FIRST PROVIDER EVALUATION
Medical screening examination initiated.  I have conducted a focused provider triage encounter, findings are as follows:    Brief history of present illness:  Patient presents with sore throat and rash.  She went to urgent care and had labs ordered.  She also wanted to get a urinalysis checked.  She has cystic fibrosis.    There were no vitals filed for this visit.    Pertinent physical exam:  Patient alert.  Oropharynx clear.    Brief workup plan:  Labs and urinalysis ordered.  Rapid strep ordered.    Preliminary workup initiated; this workup will be continued and followed by the physician or advanced practice provider that is assigned to the patient when roomed.

## 2024-11-11 NOTE — PROGRESS NOTES
Patient anxious about medical health and requesting labs and urine samples as well as vitamin levels.  Rash improving and now with ear/neck pain and sore throat without fevers.  Patient stopped taking home Trikafta for cystic fibrosis and would like to transfer care from [facility] to [facility] as she is unhappy with [facility].      Recommended to continue home Trikafta with follow up with pulmonology.  Referral for [facility] pulm ordered.  Will treat symptomatically with OTC medications for possible viral URI.

## 2024-11-11 NOTE — PROGRESS NOTES
Subjective     Chief Complaint: Rash and Congestion    History of Present Illness:  Ms. Olinda Lombardi is a 49 y.o. female with hx of cystic fibrosis, anxiety, and T2DM who presents to clinic today for evaluation of a rash and congestion. Patient states that this rash began 1 week ago. It affects her bilateral buttocks and her chest. She was seen a few days ago at urgent care and was given a dexamethasone injection and prescribed some kenalog cream. Her strep test was negative. She states that her rash has now decreased. She is feeling a sore throat and has some sinus drainage. She states that it was originally bilateral and now its mostly the right side of her neck that is painful. She denies any cough or fevers.     Review of Systems   Constitutional:  Positive for malaise/fatigue. Negative for chills and fever.   Respiratory:  Negative for cough, hemoptysis and sputum production.    Cardiovascular:  Negative for chest pain.   Genitourinary:  Negative for dysuria and urgency.   Skin:  Negative for rash.     PAST HISTORY:     Past Medical History:   Diagnosis Date    Abnormal Pap smear of vagina     Bilateral carpal tunnel syndrome     Cystic fibrosis     Cystic fibrosis     Diabetes mellitus     CFRD    Psoriasis     Recurrent upper respiratory infection (URI)        Past Surgical History:   Procedure Laterality Date    ADENOIDECTOMY      COLONOSCOPY N/A 12/18/2018    Procedure: COLONOSCOPY;  Surgeon: Ernestina Onofre MD;  Location: [facility];  Service: Endoscopy;  Laterality: N/A;    ESOPHAGOGASTRODUODENOSCOPY N/A 12/18/2018    Procedure: ESOPHAGOGASTRODUODENOSCOPY (EGD);  Surgeon: Ernestina Onofre MD;  Location: [facility];  Service: Endoscopy;  Laterality: N/A;    SINUS SURGERY         Family History   Problem Relation Name Age of Onset    Glaucoma Mother      Diabetes Mother      Cataracts Father      Heart disease Father      Macular degeneration Maternal Aunt      Breast cancer Maternal Grandmother       Colon cancer Neg Hx      Ovarian cancer Neg Hx      Celiac disease Neg Hx      Crohn's disease Neg Hx      Esophageal cancer Neg Hx      Inflammatory bowel disease Neg Hx      Irritable bowel syndrome Neg Hx      Liver cancer Neg Hx      Rectal cancer Neg Hx      Stomach cancer Neg Hx      Ulcerative colitis Neg Hx      Amblyopia Neg Hx      Blindness Neg Hx      Retinal detachment Neg Hx      Strabismus Neg Hx         Social History     Socioeconomic History    Marital status:    Occupational History    Occupation: Relator    Tobacco Use    Smoking status: Never     Passive exposure: Never    Smokeless tobacco: Never   Substance and Sexual Activity    Alcohol use: Not Currently     Comment: 1-2 drinks a week     Drug use: No    Sexual activity: Yes     Partners: Male     Birth control/protection: None     Comment:         MEDICATIONS & ALLERGIES:     Current Outpatient Medications on File Prior to Visit   Medication Sig    ALPRAZolam (XANAX) 0.5 MG tablet Take 1-2 tablets by mouth at bedtime as needed for anxiety    ALPRAZolam (XANAX) 0.5 MG tablet Take 1 to 2 tablets by mouth at bedtime as needed for anxiety.    ALPRAZolam (XANAX) 0.5 MG tablet Take 1-2 tablet by mouth at bedtime as needed for anxiety    ALPRAZolam (XANAX) 0.5 MG tablet Take 1-2 tablet by mouth at bedtime as needed for anxiety    ARIPiprazole (ABILIFY) 2 MG Tab Take 1 tablet by mouth once a day    bismuth subsalicylate (PINK BISMUTH ORAL) Take by mouth.    blood sugar diagnostic Strp To check BG 10 times daily    blood-glucose meter kit To check BG 10  times daily, to use with insurance preferred meter    blood-glucose sensor (FREESTYLE SANTI 3 SENSOR) Delmy 2 each by Misc.(Non-Drug; Combo Route) route every 14 (fourteen) days.    chlorhexidine (PERIDEX) 0.12 % solution Swish for 30 seconds with 15 mL (one capful) of undiluted oral rinse after toothbrushing, then expectorate; repeat twice daily (morning and evening) until symptoms  resolve. Use in addition to regular dental prophylaxis (cleaning).    cyanocobalamin (VITAMIN B-12) 1000 MCG tablet Take 100 mcg by mouth once daily.    dextroamphetamine-amphetamine (ADDERALL) 10 mg Tab Take 1 tablet by mouth once a day as needed    dextroamphetamine-amphetamine (ADDERALL) 10 mg Tab Take 1 tablet by mouth once a day as needed    dextroamphetamine-amphetamine (ADDERALL) 10 mg Tab Take 1 tablet by mouth once a day as needed    dextroamphetamine-amphetamine (ADDERALL) 15 mg tablet Take 1 Tablet by mouth twice a day begin on awakening.    dextroamphetamine-amphetamine (ADDERALL) 15 mg tablet Take 1 Tablet by mouth Twice a day beginning on awakening    dextroamphetamine-amphetamine (ADDERALL) 15 mg tablet Take 1 Tablet by mouth Twice a day beginning on awakening    dextroamphetamine-amphetamine (ADDERALL) 5 mg Tab Take 1 tablet by mouth once a day as needed in the afternoon    dextroamphetamine-amphetamine (MYDAYIS) 37.5 mg CT24 Take 1 capsule by mouth once a day    dextroamphetamine-amphetamine (MYDAYIS) 50 mg CT24 Take 1 capsule by mouth once a day    dextroamphetamine-amphetamine (MYDAYIS) 50 mg CT24 Take 1 capsule by mouth once a day    ferrous sulfate 325 (65 FE) MG EC tablet Take 325 mg by mouth once daily.    flash glucose scanning reader ([employer] SANTI 14 DAY READER) Misc 1 each by Misc.(Non-Drug; Combo Route) route once daily.    insulin aspart, niacinamide, (FIASP FLEXTOUCH U-100 INSULIN) 100 unit/mL (3 mL) InPn Inject 10 Units into the skin 3 (three) times daily with meals.    levocetirizine (XYZAL) 5 MG tablet Take 5 mg by mouth daily as needed.    lipase-protease-amylase (ZENPEP) 40,000-126,000- 168,000 unit CpDR take 4 capsules by mouth with meals and 2 capsules with snacks for 30 days    lipase-protease-amylase (ZENPEP) 40,000-126,000- 168,000 unit CpDR Take 4-5 capsules by mouth with meals 3 times a day, also, take 2-3 capsules by mouth with snacks 3 times a day.    loteprednol  "(LOTEMAX) 0.5 % ophthalmic suspension Place 1 drop into the left eye 4 (four) times daily.    magnesium oxide (MAG-OX) 250 mg magnesium Tab Take by mouth once.    pantoprazole (PROTONIX) 40 MG tablet Take 1 tablet (40 mg total) by mouth once daily.    pen needle, diabetic (NOVOFINE 32) 32 gauge x 1/4" Ndle Inject 1 each into the skin 3 (three) times daily before meals.    sertraline (ZOLOFT) 100 MG tablet Take 2.5 tablet by mouth once a day    sertraline (ZOLOFT) 100 MG tablet Take 2.5 tablet by mouth once a day    sertraline (ZOLOFT) 100 MG tablet Take 2.5 tablets (250 mg total) by mouth once daily.    sertraline (ZOLOFT) 100 MG tablet Take 2 tablets by mouth once a day. Take in addition to 50 mg tablet.    sertraline (ZOLOFT) 100 MG tablet Take 2 tablet by mouth once a day In addition to 50mg tablet    sertraline (ZOLOFT) 100 MG tablet Take 2 tablets by mouth once daily    sertraline (ZOLOFT) 50 MG tablet Take 1 tablet by mouth once daily    traZODone (DESYREL) 100 MG tablet Take 1 tablet by mouth nightly as needed.    triamcinolone acetonide 0.1% (KENALOG) 0.1 % ointment Apply topically 2 (two) times daily.    TRIKAFTA 100-50-75 mg(d) /150 mg (n) TbSQ TAKE 2 ORANGE TABLETS IN THE MORNING AND 1 BLUE TABLET IN THE EVENING APPROXIMATELY 12 HOURS APART. TAKE WITH FAT-CONTAINING FOOD    ZENPEP 40,000-126,000- 168,000 unit CpDR TAKE 4 CAPSULES THREE TIMES A DAY WITH MEALS     No current facility-administered medications on file prior to visit.       Review of patient's allergies indicates:   Allergen Reactions    Milk containing products (dairy) Diarrhea    Penicillin     Soy Diarrhea    Strawberry        OBJECTIVE:     Vital Signs:  There were no vitals filed for this visit.    There is no height or weight on file to calculate BMI.     Physical Exam  Constitutional:       Appearance: Normal appearance.   HENT:      Head: Normocephalic and atraumatic.      Right Ear: External ear normal.      Left Ear: External ear " normal.   Eyes:      Conjunctiva/sclera: Conjunctivae normal.   Cardiovascular:      Rate and Rhythm: Normal rate and regular rhythm.      Pulses: Normal pulses.      Heart sounds: Normal heart sounds.   Pulmonary:      Effort: Pulmonary effort is normal.      Breath sounds: Normal breath sounds.   Abdominal:      General: Abdomen is flat. Bowel sounds are normal.      Palpations: Abdomen is soft.   Musculoskeletal:      Right lower leg: No edema.      Left lower leg: No edema.   Skin:     General: Skin is warm.   Neurological:      General: No focal deficit present.      Mental Status: She is alert and oriented to person, place, and time.     Laboratory  No results found for this or any previous visit (from the past 24 hours).    Diagnostic Results:      Health Maintenance Due   Topic Date Due    HIV Screening  Never done    Mammogram  05/29/2019    Influenza Vaccine (1) 09/01/2024    COVID-19 Vaccine (4 - 2024-25 season) 09/01/2024         ASSESSMENT & PLAN:     Ms. Olinda Lombardi is a 49 y.o. female who presents for congestion.      1. Upper respiratory tract infection, unspecified type        -  Patient encouraged to continue symptom control with Zyrtec or Claritin        - Symptoms consistent with upper viral infection        - Encouraged to return if her rash returns or is having a productive cough    2. Cystic fibrosis  -     CBC W/ AUTO DIFFERENTIAL; Future; Expected date: 11/11/2024  -     COMPREHENSIVE METABOLIC PANEL; Future; Expected date: 11/11/2024  -     Ambulatory referral/consult to Pulmonology; Future; Expected date: 11/18/2024  -    Patient concerned about side effects of Trikafta and encouraged to follow up with pulmonology about possible alternative medications        Discussed with Dr. Decker  - staff attestation to follow      Sagrario Gabriel DO  Internal Medicine PGY-1  [facility]  [address] [place] [address]  [phone]

## 2024-11-11 NOTE — ED PROVIDER NOTES
Encounter Date: 11/11/2024       History     Chief Complaint   Patient presents with    Multiple complaints     Wanting urinalysis, ear pain, hurts to swallow, rash to r thigh, got decadron shot at [facility]  Olinda Lombardi is a 49 y.o. female, PMH DMT2, CF, Pancreatic Insuff, ADHD, Psoriasis, Iron Deficiency anemia, Insomnia, Anxiety, presenting with multiple complaints. Pt reports being seen at her PCP office and felt dismissed when they wouldn't order a  UA so she left and presented to the ED. Pt reports using chat GPT to help with her investigation of symptoms at home and feels worried she might be experiencing a medication SE. She takes trikafta for years now but is  worried it might be causing a rash. She reports discontinuing this medication over the last few days but now having new outbreak of the rash. Pt has photos of a now resolved rash on the buttocks, thighs. She also has new outbreak of this same, patchy red rash on her L-lateral neck today. She report getting a steroid shot at urgent care that seemed to improve this. Pt reports pain of the throat when she swallows only but does not feel this is a sore throat.     She report remote hx of uterine fibroids and has concern that these could be causing urinary bladder output obstruction and would like a UA to make sure this is normal. Pt reports some increased frequency without burning over the last few days. She reports remote hx of childhood allergies and hx of CF with fat soluble vitamin deficiency but sees no doctors for these conditions. She is concerned that her chronic diarrhea could be a medication SE and not related to her CF.   Review of patient's allergies indicates:   Allergen Reactions    Milk containing products (dairy) Diarrhea    Penicillin     Soy Diarrhea    [place]      Past Medical History:   Diagnosis Date    Abnormal Pap smear of vagina     Bilateral carpal tunnel syndrome     Cystic fibrosis     Cystic fibrosis     Diabetes  mellitus     CFRD    Psoriasis     Recurrent upper respiratory infection (URI)      Past Surgical History:   Procedure Laterality Date    ADENOIDECTOMY      COLONOSCOPY N/A 12/18/2018    Procedure: COLONOSCOPY;  Surgeon: Ernestina Onofre MD;  Location: [facility];  Service: Endoscopy;  Laterality: N/A;    ESOPHAGOGASTRODUODENOSCOPY N/A 12/18/2018    Procedure: ESOPHAGOGASTRODUODENOSCOPY (EGD);  Surgeon: Ernestina Onofre MD;  Location: [facility];  Service: Endoscopy;  Laterality: N/A;    SINUS SURGERY       Family History   Problem Relation Name Age of Onset    Glaucoma Mother      Diabetes Mother      Cataracts Father      Heart disease Father      Macular degeneration Maternal Aunt      Breast cancer Maternal Grandmother      Colon cancer Neg Hx      Ovarian cancer Neg Hx      Celiac disease Neg Hx      Crohn's disease Neg Hx      Esophageal cancer Neg Hx      Inflammatory bowel disease Neg Hx      Irritable bowel syndrome Neg Hx      Liver cancer Neg Hx      Rectal cancer Neg Hx      Stomach cancer Neg Hx      Ulcerative colitis Neg Hx      Amblyopia Neg Hx      Blindness Neg Hx      Retinal detachment Neg Hx      Strabismus Neg Hx       Social History     Tobacco Use    Smoking status: Never     Passive exposure: Never    Smokeless tobacco: Never   Substance Use Topics    Alcohol use: Not Currently     Comment: 1-2 drinks a week     Drug use: No     Review of Systems    Physical Exam     Initial Vitals [11/11/24 1647]   BP Pulse Resp Temp SpO2   (!) 145/97 91 18 99.2 °F (37.3 °C) 99 %      MAP       --         Physical Exam    Nursing note and vitals reviewed.  Constitutional: She appears well-developed and well-nourished. She is not diaphoretic. She is active and cooperative.  Non-toxic appearance. She does not have a sickly appearance. She does not appear ill. No distress.   HENT:   Head: Normocephalic. Mouth/Throat: Uvula is midline and oropharynx is clear and moist. Mucous membranes are not pale and not dry.  No trismus in the jaw. No uvula swelling. No oropharyngeal exudate, posterior oropharyngeal edema, posterior oropharyngeal erythema or tonsillar abscesses.   Eyes: Conjunctivae and EOM are normal. No scleral icterus.   Neck: Phonation normal. Neck supple. No thyroid mass present. No stridor present. No tracheal tenderness present. No Brudzinski's sign and no Kernig's sign noted. No JVD present.   No adenopathy on exam.   Normal range of motion.  Cardiovascular:  Normal rate, regular rhythm and intact distal pulses.  No extrasystoles are present.    Exam reveals no gallop, no S3, no S4, no distant heart sounds and no friction rub.       No murmur heard.  Pulmonary/Chest: Breath sounds normal. No respiratory distress. She has no wheezes. She has no rhonchi. She has no rales.   Abdominal: Abdomen is soft and flat. She exhibits no shifting dullness, no distension and no fluid wave. There is no abdominal tenderness. There is no rebound and no guarding.   Musculoskeletal:      Cervical back: Normal range of motion and neck supple. No edema, erythema or rigidity. Normal range of motion.     Neurological: She is alert and oriented to person, place, and time. She is not disoriented. GCS score is 15. GCS eye subscore is 4. GCS verbal subscore is 5. GCS motor subscore is 6.   Skin: Skin is warm and dry. Rash noted.   Patchy, raised red rash that is not pruritic on exam    Psychiatric: Her speech is tangential. Her speech is not delayed and not slurred. She is hyperactive. Cognition and memory are not impaired. She exhibits normal recent memory and normal remote memory.         ED Course   Procedures  Labs Reviewed   CBC W/ AUTO DIFFERENTIAL - Abnormal       Result Value    WBC 4.44      RBC 3.57 (*)     Hemoglobin 10.9 (*)     Hematocrit 32.1 (*)     MCV 90      MCH 30.5      MCHC 34.0      RDW 12.7      Platelets 257      MPV 8.9 (*)     Immature Granulocytes 0.5      Gran # (ANC) 3.0      Immature Grans (Abs) 0.02      Lymph  # 1.0      Mono # 0.4      Eos # 0.1      Baso # 0.03      nRBC 0      Gran % 67.3      Lymph % 21.6      Mono % 8.1      Eosinophil % 1.8      Basophil % 0.7      Differential Method Automated      Narrative:     Release to patient->Immediate   COMPREHENSIVE METABOLIC PANEL - Abnormal    Sodium 138      Potassium 3.8      Chloride 106      CO2 27      Glucose 176 (*)     BUN 8      Creatinine 0.7      Calcium 8.7      Total Protein 6.1      Albumin 3.0 (*)     Total Bilirubin 0.3      Alkaline Phosphatase 97      AST 15      ALT 15      eGFR >60.0      Anion Gap 5 (*)     Narrative:     Release to patient->Immediate   URINALYSIS, REFLEX TO URINE CULTURE - Abnormal    Specimen UA Urine, Clean Catch      Color, UA Yellow      Appearance, UA Hazy (*)     pH, UA 7.0      Specific Gravity, UA 1.015      Protein, UA Negative      Glucose, UA 3+ (*)     Ketones, UA Negative      Bilirubin (UA) Negative      Occult Blood UA 3+ (*)     Nitrite, UA Negative      Leukocytes, UA Negative      Narrative:     Specimen Source->Urine   URINALYSIS MICROSCOPIC - Abnormal    RBC, UA 5 (*)     WBC, UA 11 (*)     Bacteria Few (*)     Yeast, UA None      Squam Epithel, UA 50      Microscopic Comment SEE COMMENT      Narrative:     Specimen Source->Urine   GROUP A STREP, MOLECULAR    Group A Strep, Molecular Negative     GROUP A STREP, MOLECULAR   CULTURE, URINE   HIV 1 / 2 ANTIBODY    HIV 1/2 Ag/Ab Non-reactive      Narrative:     Release to patient->Immediate   HEPATITIS C ANTIBODY    Hepatitis C Ab Non-reactive      Narrative:     Release to patient->Immediate   POCT URINE PREGNANCY    POC Preg Test, Ur Negative       Acceptable Yes            Imaging Results    None          Medications - No data to display  Medical Decision Making  Olinda Lombardi  is a 49 y.o. female, presenting with complaints of multiple complaints, history of present illness obtained from pt as seen above. Patient able to provide adequate  history of present illness and tolerated physical exam well. Patient found to be anxious but linear, stable. Exam notable as above.     DDX includes but is not limited to: Medications SE, post viral exanthem, viral syndrome, urinary bladder obstruction, UTI  UA concerning for infection, however this appears to be contaminant. Considering her increased frequency, we will provide Macrobid. Pt did not have evidence of urinary obstruction on PVR. Pt reports concern for diarrhea from medication SE vs CF. Trikafta has 13% occurrence, however this is also a known SE of CF. Will defer to GI for now. Trikafta has reported SE for acne vulgaris, eczema, pruritus; these do not fit the description of her rash. Rash is more likely post viral rather than medication SE  but will defer to Derm and Pulm for further investigation. Overall clinical picture most consistent with post viral rash and appropriate for outpatient follow up at this time.     See ED course for additional discussion. Pt provided referrals with GI, Allergy/Immunology, and Dermatology for further investigation of her symptoms.     Data Reviewed/Counseling: I have reviewed the patient's vital signs, nursing notes, and other relevant tests/information. I had a detailed discussion regarding the historical points, exam findings, and any diagnostic results supporting the discharge diagnosis. Any incidental findings were discussed with the patient. I also discussed the need for outpatient follow-up and the need to return to the ED if symptoms worsen or if there are any questions or concerns that arise at home. Strict return and follow-up precautions have been given by me personally to the patient/family/caregiver(s).  Disposition: Discharged     Amount and/or Complexity of Data Reviewed  External Data Reviewed: notes.     Details: Pt seen in clinic earlier today requesting UA. She was denied and subsequently requesting to be referred to [facility] and reports upset  with the care she has received at [facility]. Their overall impression was also viral syndrome. Referral in place for Pulmonology.   Labs:  Decision-making details documented in ED Course.    Risk  Prescription drug management.               ED Course as of 11/12/24 1015   Mon Nov 11, 2024 2122 WBC: 4.44  No leukocytosis [HB]   2123 Hemoglobin(!): 10.9  Mild anemia compared to prior, not requiring transfusion today. [HB]   2209 Group A Strep, Molecular: Negative [HB]      ED Course User Index  [HB] Nitza Romero MD                           Clinical Impression:  Final diagnoses:  [R21] Rash (Primary)  [E84.9] CF (cystic fibrosis)  [N39.0] Urinary tract infection without hematuria, site unspecified          ED Disposition Condition    Discharge Stable          ED Prescriptions       Medication Sig Dispense Start Date End Date Auth. Provider    nitrofurantoin, macrocrystal-monohydrate, (MACROBID) 100 MG capsule Take 1 capsule (100 mg total) by mouth 2 (two) times daily. for 7 days 14 capsule 11/11/2024 11/18/2024 Nitza Romero MD          Follow-up Information       Follow up With Specialties Details Why Contact Info Additional Information    [facility] 4th Fl Gastro[facility]ology   [address] [facility] [id number] [phone] [facility] - Main Building, 4th Floor Please park in [address] and take [facility]rium elevator    [facility]   [address] [facility] [id number]  [phone]     [facility] - Dermatology [address] [place] [address] Dermatology   [address] [facility] [id number] [phone] Dermatology - Main Building, Clinic 11th Floor Please park in [address]. Use Clinic elevators 12 & 13 to get to the 11th floor             Nitza Romero MD  Resident  11/12/24 1016       Nitza Romero MD  Resident  11/13/24 7207

## 2024-11-12 ENCOUNTER — TELEPHONE (OUTPATIENT)
Dept: PULMONOLOGY | Facility: CLINIC | Age: 49
End: 2024-11-12
Payer: COMMERCIAL

## 2024-11-12 DIAGNOSIS — R06.02 SOB (SHORTNESS OF BREATH): Primary | ICD-10-CM

## 2024-11-12 LAB
BACTERIA UR CULT: NORMAL
BACTERIA UR CULT: NORMAL

## 2024-11-12 NOTE — ED NOTES
Urine cup provided to pt at this time. Pt made aware and verbalized understanding of need for urine sample. Dr. Romero at pt bedside.

## 2024-11-12 NOTE — ED TRIAGE NOTES
Pt arrives to ED with multiple complaints . Pt has Hx of Cystic fibrosis and has been taking trikefta for 4 years . She recently developed a rash on her right hip and buttock. The rash does not itch or disturb her in anyway. She also has complaints of bilateral neck pain  from he ear to her collar bone. She stopped taking trikefta  Friday  and has had some relief with her neck pain pain and rash has gotten better .

## 2024-11-12 NOTE — DISCHARGE INSTRUCTIONS
Diagnosis: Rash    Follow-Up Plan:  - Follow-up with primary care doctor within 3 - 5 days  - Additional testing and/or evaluation as directed by your primary doctor  - We have placed referrals with GI, Dermatology, and Allergy/Immunology. Please look out for their calls to schedule these appointment for further work up of your symptoms  - We are providing an antibiotic for your UTI, please take this as directed until it is gone.     Return to the Emergency Department for symptoms including but not limited to: worsening symptoms, shortness of breath or chest pain, vomiting with inability to hold down fluids, fevers greater than 100.4°F, passing out/fainting/unconsciousness, or other concerning symptoms.    We would like to thank you for coming today and our hope is that we served you and your family well during your stay

## 2024-11-12 NOTE — ED NOTES
Received report from JOHNY Mak. Assumed care of Olinda Lombardi, a 49 y.o. female at this time.    Triage note:  Chief Complaint   Patient presents with    Multiple complaints     Wanting urinalysis, ear pain, hurts to swallow, rash to r thigh, got decadron shot at        LOC: The patient is awake, alert and aware of environment with an appropriate affect, oriented x 4 and speaking appropriately.   APPEARANCE: Patient appears comfortable and in no acute distress, patient is clean and well groomed.  NEURO: Pt opens eyes spontaneously, behavior appropriate to situation, follows commands, facial expression symmetrical, bilateral hand grasp equal and even, purposeful motor response noted, normal sensation in all extremities when touched with a finger, PERRLA noted.  HEENT: Head is normocephalic and sits midline on the shoulders, eyes are symmetrical with no blurry vision or glasses; ears are clean, dry, intact, patent with no cerumen blocking the pathway, no hearing aids present; nose is free from clean, dry, intact, without any drainage, both nostrils are patent. Pt reports otalgia and odynophagia.  SKIN: The skin is warm and dry, patient has normal skin turgor and moist mucus membranes, skin intact, no breakdown or bruising noted. Rash noted on left neck and the right thigh.  MUSCULOSKELETAL: Patient moving all extremities spontaneously with AROM in all extremities, no swelling noted. Pt is able to ambulate independently without assistance.   RESPIRATORY: Airway is open and patent, respirations are spontaneous, patient has a normal effort and rate, no accessory muscle use noted, breath sounds are clear and equal bilaterally. The pt denies SOB at this time.  CARDIAC: No edema noted, capillary refill < 3 seconds. The pt denies chest pain at this time.  GASTRO: Soft and non tender to palpation, no distention noted, normoactive bowel sounds present in all four quadrants. The pt denies any nausea or vomiting at  this time.  : Pt denies any pain or frequency with urination.  VASCULAR: All pulses 2+, all extremities warm, no numbness or tingling to any extremities, no edema noted.

## 2024-11-17 ENCOUNTER — PATIENT MESSAGE (OUTPATIENT)
Dept: OPTOMETRY | Facility: CLINIC | Age: 49
End: 2024-11-17
Payer: COMMERCIAL

## 2024-12-09 ENCOUNTER — LAB VISIT (OUTPATIENT)
Dept: LAB | Facility: HOSPITAL | Age: 49
End: 2024-12-09
Attending: STUDENT IN AN ORGANIZED HEALTH CARE EDUCATION/TRAINING PROGRAM
Payer: COMMERCIAL

## 2024-12-09 ENCOUNTER — OFFICE VISIT (OUTPATIENT)
Dept: ALLERGY | Facility: CLINIC | Age: 49
End: 2024-12-09
Payer: COMMERCIAL

## 2024-12-09 ENCOUNTER — OFFICE VISIT (OUTPATIENT)
Dept: OBSTETRICS AND GYNECOLOGY | Facility: CLINIC | Age: 49
End: 2024-12-09
Payer: COMMERCIAL

## 2024-12-09 VITALS
WEIGHT: 133.63 LBS | HEIGHT: 61 IN | SYSTOLIC BLOOD PRESSURE: 130 MMHG | BODY MASS INDEX: 25.23 KG/M2 | DIASTOLIC BLOOD PRESSURE: 76 MMHG

## 2024-12-09 VITALS
HEIGHT: 61 IN | DIASTOLIC BLOOD PRESSURE: 83 MMHG | WEIGHT: 133.81 LBS | OXYGEN SATURATION: 99 % | BODY MASS INDEX: 25.27 KG/M2 | HEART RATE: 84 BPM | SYSTOLIC BLOOD PRESSURE: 135 MMHG

## 2024-12-09 DIAGNOSIS — E84.8 DIABETES MELLITUS RELATED TO CYSTIC FIBROSIS: ICD-10-CM

## 2024-12-09 DIAGNOSIS — Z12.4 ENCOUNTER FOR SCREENING FOR CERVICAL CANCER: ICD-10-CM

## 2024-12-09 DIAGNOSIS — R21 RASH: ICD-10-CM

## 2024-12-09 DIAGNOSIS — N85.8 OTHER SPECIFIED NONINFLAMMATORY DISORDERS OF UTERUS: ICD-10-CM

## 2024-12-09 DIAGNOSIS — Z11.51 ENCOUNTER FOR SCREENING FOR HUMAN PAPILLOMAVIRUS (HPV): ICD-10-CM

## 2024-12-09 DIAGNOSIS — E34.9 HORMONE IMBALANCE: ICD-10-CM

## 2024-12-09 DIAGNOSIS — E08.9 DIABETES MELLITUS RELATED TO CYSTIC FIBROSIS: ICD-10-CM

## 2024-12-09 DIAGNOSIS — Z12.31 BREAST CANCER SCREENING BY MAMMOGRAM: Primary | ICD-10-CM

## 2024-12-09 DIAGNOSIS — R21 RASH: Primary | ICD-10-CM

## 2024-12-09 DIAGNOSIS — D25.1 INTRAMURAL UTERINE FIBROID: ICD-10-CM

## 2024-12-09 PROCEDURE — 99203 OFFICE O/P NEW LOW 30 MIN: CPT | Mod: 25,S$GLB,, | Performed by: OBSTETRICS & GYNECOLOGY

## 2024-12-09 PROCEDURE — 3075F SYST BP GE 130 - 139MM HG: CPT | Mod: CPTII,S$GLB,, | Performed by: OBSTETRICS & GYNECOLOGY

## 2024-12-09 PROCEDURE — 3008F BODY MASS INDEX DOCD: CPT | Mod: CPTII,S$GLB,, | Performed by: OBSTETRICS & GYNECOLOGY

## 2024-12-09 PROCEDURE — 1160F RVW MEDS BY RX/DR IN RCRD: CPT | Mod: CPTII,S$GLB,, | Performed by: STUDENT IN AN ORGANIZED HEALTH CARE EDUCATION/TRAINING PROGRAM

## 2024-12-09 PROCEDURE — 36415 COLL VENOUS BLD VENIPUNCTURE: CPT | Performed by: STUDENT IN AN ORGANIZED HEALTH CARE EDUCATION/TRAINING PROGRAM

## 2024-12-09 PROCEDURE — 99386 PREV VISIT NEW AGE 40-64: CPT | Mod: S$GLB,,, | Performed by: OBSTETRICS & GYNECOLOGY

## 2024-12-09 PROCEDURE — 99205 OFFICE O/P NEW HI 60 MIN: CPT | Mod: S$GLB,,, | Performed by: STUDENT IN AN ORGANIZED HEALTH CARE EDUCATION/TRAINING PROGRAM

## 2024-12-09 PROCEDURE — 3008F BODY MASS INDEX DOCD: CPT | Mod: CPTII,S$GLB,, | Performed by: STUDENT IN AN ORGANIZED HEALTH CARE EDUCATION/TRAINING PROGRAM

## 2024-12-09 PROCEDURE — 99999 PR PBB SHADOW E&M-EST. PATIENT-LVL V: CPT | Mod: PBBFAC,,, | Performed by: OBSTETRICS & GYNECOLOGY

## 2024-12-09 PROCEDURE — 87624 HPV HI-RISK TYP POOLED RSLT: CPT | Performed by: OBSTETRICS & GYNECOLOGY

## 2024-12-09 PROCEDURE — 3079F DIAST BP 80-89 MM HG: CPT | Mod: CPTII,S$GLB,, | Performed by: STUDENT IN AN ORGANIZED HEALTH CARE EDUCATION/TRAINING PROGRAM

## 2024-12-09 PROCEDURE — 88175 CYTOPATH C/V AUTO FLUID REDO: CPT | Performed by: OBSTETRICS & GYNECOLOGY

## 2024-12-09 PROCEDURE — 1159F MED LIST DOCD IN RCRD: CPT | Mod: CPTII,S$GLB,, | Performed by: STUDENT IN AN ORGANIZED HEALTH CARE EDUCATION/TRAINING PROGRAM

## 2024-12-09 PROCEDURE — 86003 ALLG SPEC IGE CRUDE XTRC EA: CPT | Performed by: STUDENT IN AN ORGANIZED HEALTH CARE EDUCATION/TRAINING PROGRAM

## 2024-12-09 PROCEDURE — 99999 PR PBB SHADOW E&M-EST. PATIENT-LVL V: CPT | Mod: PBBFAC,,, | Performed by: STUDENT IN AN ORGANIZED HEALTH CARE EDUCATION/TRAINING PROGRAM

## 2024-12-09 PROCEDURE — 3078F DIAST BP <80 MM HG: CPT | Mod: CPTII,S$GLB,, | Performed by: OBSTETRICS & GYNECOLOGY

## 2024-12-09 PROCEDURE — 3075F SYST BP GE 130 - 139MM HG: CPT | Mod: CPTII,S$GLB,, | Performed by: STUDENT IN AN ORGANIZED HEALTH CARE EDUCATION/TRAINING PROGRAM

## 2024-12-09 PROCEDURE — 86003 ALLG SPEC IGE CRUDE XTRC EA: CPT | Mod: 59 | Performed by: STUDENT IN AN ORGANIZED HEALTH CARE EDUCATION/TRAINING PROGRAM

## 2024-12-09 RX ORDER — TOBRAMYCIN AND DEXAMETHASONE 3; 1 MG/ML; MG/ML
1 SUSPENSION/ DROPS OPHTHALMIC 3 TIMES DAILY
COMMUNITY
Start: 2024-08-10

## 2024-12-09 RX ORDER — BISACODYL 5 MG
TABLET, DELAYED RELEASE (ENTERIC COATED) ORAL
COMMUNITY

## 2024-12-09 RX ORDER — INSULIN ASPART INJECTION 100 [IU]/ML
10 INJECTION, SOLUTION SUBCUTANEOUS
Qty: 15 ML | Refills: 0 | Status: SHIPPED | OUTPATIENT
Start: 2024-12-09

## 2024-12-09 RX ORDER — ASPIRIN 325 MG
50000 TABLET, DELAYED RELEASE (ENTERIC COATED) ORAL
COMMUNITY

## 2024-12-09 NOTE — PROGRESS NOTES
Subjective:       Patient ID: Olinda Lombardi is a 49 y.o. female.    Chief Complaint:  New Patient GYN  (Last pap + hpv: 2020 (normal) (neg)/Last dx mammogram: 2018 birads: 1 neg./Along with breast US./T-C: 19.26%/Former [facility] pt. H/o CF.) and Fibroids (C/o uterine fibroids (3), 8 cm in size noted./Transvaginal US done back in ./Would like to check back on this./She passed a 5mm kidney stone in past./C/o hematuria in urine, requesting UA. )        History of Present Illness  Olinda Lombardi is a 49 y.o. female  who presents for     CF   Fibroids  Gene therapy  8 cm  MRI  Can feel fibroid    Patient's last menstrual period was 2024 (exact date).   Date of Last Pap: 2024    Review of Systems  Review of Systems     Objective:   Physical Exam     Assessment/ Plan:     1. Breast cancer screening by mammogram  Mammo Digital Screening Bilat w/ Calixto      2. Encounter for screening for cervical cancer  Liquid-Based Pap Smear, Screening      3. Encounter for screening for human papillomavirus (HPV)  HPV High Risk Genotypes, PCR      4. Other specified noninflammatory disorders of uterus  MRI Female Pelvis W W/O Contrast      5. Intramural uterine fibroid  MRI Female Pelvis W W/O Contrast      6. Hormone imbalance  Estradiol    Follicle Stimulating Hormone    TSH          No follow-ups on file.    As of 2021, the Cures Act has been passed nationally. This new law requires that all doctors progress notes, lab results, pathology reports and radiology reports be released IMMEDIATELY to the patient in the patient portal. That means that the results are released to you at the EXACT same time they are released to me. Therefore, with all of the patients that I have I am not able to reply to each patient exactly when the results come in. So there will be a delay from when you see the results to when I see them and have time to come up with a response to send you. Also I only see these  results when I am on the computer at work. So if the results come in over the weekend or after 5 pm of a work day, I will not see them until the next business day. As you can tell, this is a challenge as a physician to give every patient the quick response they hope for and deserve. So please be patient! Thanks for understanding, Dr. Srinivasan     2021, the Cures Act has been passed nationally. This new law requires that all doctors progress notes, lab results, pathology reports and radiology reports be released IMMEDIATELY to the patient in the patient portal. That means that the results are released to you at the EXACT same time they are released to me. Therefore, with all of the patients that I have I am not able to reply to each patient exactly when the results come in. So there will be a delay from when you see the results to when I see them and have time to come up with a response to send you. Also I only see these results when I am on the computer at work. So if the results come in over the weekend or after 5 pm of a work day, I will not see them until the next business day. As you can tell, this is a challenge as a physician to give every patient the quick response they hope for and deserve. So please be patient! Thanks for understanding, Dr. Srinivasan

## 2024-12-12 LAB
COCONUT IGE QN: <0.1 KU/L
COW MILK IGE QN: <0.1 KU/L
FINAL PATHOLOGIC DIAGNOSIS: NORMAL
Lab: NORMAL
RAST CLASS: NORMAL
RAST CLASS: NORMAL

## 2024-12-16 ENCOUNTER — LAB VISIT (OUTPATIENT)
Dept: LAB | Facility: HOSPITAL | Age: 49
End: 2024-12-16
Payer: COMMERCIAL

## 2024-12-16 DIAGNOSIS — E34.9 HORMONE IMBALANCE: ICD-10-CM

## 2024-12-16 LAB
ESTRADIOL SERPL-MCNC: 73 PG/ML
FSH SERPL-ACNC: 8.04 MIU/ML
TSH SERPL DL<=0.005 MIU/L-ACNC: 1.52 UIU/ML (ref 0.4–4)

## 2024-12-16 PROCEDURE — 36415 COLL VENOUS BLD VENIPUNCTURE: CPT | Mod: PN | Performed by: OBSTETRICS & GYNECOLOGY

## 2024-12-16 PROCEDURE — 82670 ASSAY OF TOTAL ESTRADIOL: CPT | Performed by: OBSTETRICS & GYNECOLOGY

## 2024-12-16 PROCEDURE — 84443 ASSAY THYROID STIM HORMONE: CPT | Performed by: OBSTETRICS & GYNECOLOGY

## 2024-12-16 PROCEDURE — 83001 ASSAY OF GONADOTROPIN (FSH): CPT | Performed by: OBSTETRICS & GYNECOLOGY

## 2024-12-18 ENCOUNTER — PATIENT MESSAGE (OUTPATIENT)
Dept: ALLERGY | Facility: CLINIC | Age: 49
End: 2024-12-18
Payer: COMMERCIAL

## 2024-12-19 ENCOUNTER — HOSPITAL ENCOUNTER (OUTPATIENT)
Dept: RADIOLOGY | Facility: HOSPITAL | Age: 49
Discharge: HOME | End: 2024-12-19
Attending: OBSTETRICS & GYNECOLOGY
Payer: COMMERCIAL

## 2024-12-19 VITALS — BODY MASS INDEX: 25.11 KG/M2 | WEIGHT: 133 LBS | HEIGHT: 61 IN

## 2024-12-19 DIAGNOSIS — N85.8 OTHER SPECIFIED NONINFLAMMATORY DISORDERS OF UTERUS: ICD-10-CM

## 2024-12-19 DIAGNOSIS — D25.1 INTRAMURAL UTERINE FIBROID: ICD-10-CM

## 2024-12-19 DIAGNOSIS — Z12.31 BREAST CANCER SCREENING BY MAMMOGRAM: ICD-10-CM

## 2024-12-19 PROCEDURE — 25500020 PHARM REV CODE 255: Performed by: OBSTETRICS & GYNECOLOGY

## 2024-12-19 PROCEDURE — 77067 SCR MAMMO BI INCL CAD: CPT | Mod: TC

## 2024-12-19 PROCEDURE — 72197 MRI PELVIS W/O & W/DYE: CPT | Mod: TC

## 2024-12-19 PROCEDURE — 72197 MRI PELVIS W/O & W/DYE: CPT | Mod: 26,,, | Performed by: RADIOLOGY

## 2024-12-19 PROCEDURE — A9585 GADOBUTROL INJECTION: HCPCS | Performed by: OBSTETRICS & GYNECOLOGY

## 2024-12-19 RX ORDER — GADOBUTROL 604.72 MG/ML
10 INJECTION INTRAVENOUS
Status: COMPLETED | OUTPATIENT
Start: 2024-12-19 | End: 2024-12-19

## 2024-12-19 RX ADMIN — GADOBUTROL 10 ML: 604.72 INJECTION INTRAVENOUS at 03:12

## 2025-01-04 ENCOUNTER — PATIENT MESSAGE (OUTPATIENT)
Dept: OPTOMETRY | Facility: CLINIC | Age: 50
End: 2025-01-04
Payer: COMMERCIAL

## 2025-01-06 ENCOUNTER — TELEPHONE (OUTPATIENT)
Dept: OPTOMETRY | Facility: CLINIC | Age: 50
End: 2025-01-06
Payer: COMMERCIAL

## 2025-03-20 ENCOUNTER — PATIENT MESSAGE (OUTPATIENT)
Dept: OBSTETRICS AND GYNECOLOGY | Facility: CLINIC | Age: 50
End: 2025-03-20
Payer: COMMERCIAL

## 2025-03-21 DIAGNOSIS — E84.8 DIABETES MELLITUS RELATED TO CYSTIC FIBROSIS: ICD-10-CM

## 2025-03-21 DIAGNOSIS — E08.9 DIABETES MELLITUS RELATED TO CYSTIC FIBROSIS: ICD-10-CM

## 2025-03-24 RX ORDER — INSULIN ASPART INJECTION 100 [IU]/ML
10 INJECTION, SOLUTION SUBCUTANEOUS
Qty: 15 ML | Refills: 0 | OUTPATIENT
Start: 2025-03-24

## 2025-05-02 DIAGNOSIS — E08.9 DIABETES MELLITUS RELATED TO CYSTIC FIBROSIS: ICD-10-CM

## 2025-05-02 DIAGNOSIS — E84.8 DIABETES MELLITUS RELATED TO CYSTIC FIBROSIS: ICD-10-CM

## 2025-05-02 RX ORDER — INSULIN ASPART INJECTION 100 [IU]/ML
10 INJECTION, SOLUTION SUBCUTANEOUS
Qty: 15 ML | Refills: 0 | OUTPATIENT
Start: 2025-05-02